# Patient Record
Sex: FEMALE | Race: WHITE | NOT HISPANIC OR LATINO | Employment: FULL TIME
[De-identification: names, ages, dates, MRNs, and addresses within clinical notes are randomized per-mention and may not be internally consistent; named-entity substitution may affect disease eponyms.]

---

## 2017-09-03 ENCOUNTER — HEALTH MAINTENANCE LETTER (OUTPATIENT)
Age: 37
End: 2017-09-03

## 2018-01-29 ENCOUNTER — TRANSFERRED RECORDS (OUTPATIENT)
Dept: HEALTH INFORMATION MANAGEMENT | Facility: CLINIC | Age: 38
End: 2018-01-29

## 2018-02-19 NOTE — TELEPHONE ENCOUNTER
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 Vision not improving after steroid injection   Ref Provider/Clinic: Dr. Woodall/[facility].   Internal Records    External Records 01/29/18 Office notes- [facility]   [facility] Requested?: yes   Done?:      ACTION  02/19/18 2:51 PM   What did you do? Faxed   Who? [facility]   Phone/Fax Number:  Phone:  [phone]     Fax:[phone]   Outcome? Faxed Medical Records Request

## 2018-02-28 ENCOUNTER — PRE VISIT (OUTPATIENT)
Dept: OPHTHALMOLOGY | Facility: CLINIC | Age: 38
End: 2018-02-28

## 2018-04-13 ENCOUNTER — TRANSFERRED RECORDS (OUTPATIENT)
Dept: HEALTH INFORMATION MANAGEMENT | Facility: CLINIC | Age: 38
End: 2018-04-13

## 2018-04-13 LAB
HPV ABSTRACT: NORMAL
PAP-ABSTRACT: NORMAL

## 2018-07-25 NOTE — TELEPHONE ENCOUNTER
FUTURE VISIT INFORMATION      FUTURE VISIT INFORMATION:    Date: 07/26/2018    Time:     Location:   REFERRAL INFORMATION:    Referring provider:  Valery Cronin MD    Referring providers clinic:      Reason for visit/diagnosis          RECORDS REQUESTED FROM:       Clinic name Comments Records Status Imaging Status   [facility] Polanco   MR Thoracic Spine W/WO IV Cont (02/06/2018 2:58 PM)  MR Cervical Spine W/WO IV Cont (02/06/2018 2:58 PM)  FL Lumbar Puncture (For CSF) (02/23/2018 3:12 PM)  Requested 07/25/2018  In-Coming    [facility]  MRI BRAIN/ORBITS W/O&W CON  01/29/2018  Requested 07/25/2018   In-Coming                              RECORDS STATUS      Internal Records; Epic, Care Everywhere and PACS.

## 2018-07-26 ENCOUNTER — PRE VISIT (OUTPATIENT)
Dept: NEUROLOGY | Facility: CLINIC | Age: 38
End: 2018-07-26

## 2018-07-26 ENCOUNTER — OFFICE VISIT (OUTPATIENT)
Dept: NEUROLOGY | Facility: CLINIC | Age: 38
End: 2018-07-26
Payer: COMMERCIAL

## 2018-07-26 VITALS
SYSTOLIC BLOOD PRESSURE: 128 MMHG | HEIGHT: 68 IN | HEART RATE: 76 BPM | OXYGEN SATURATION: 99 % | BODY MASS INDEX: 28.52 KG/M2 | RESPIRATION RATE: 20 BRPM | WEIGHT: 188.2 LBS | DIASTOLIC BLOOD PRESSURE: 89 MMHG

## 2018-07-26 DIAGNOSIS — G35 MULTIPLE SCLEROSIS (H): Primary | ICD-10-CM

## 2018-07-26 PROCEDURE — G0463 HOSPITAL OUTPT CLINIC VISIT: HCPCS | Mod: ZF

## 2018-07-26 RX ORDER — VENLAFAXINE 75 MG/1
75 TABLET ORAL
COMMUNITY
End: 2018-08-30

## 2018-07-26 RX ORDER — LEVOTHYROXINE SODIUM 137 UG/1
137 TABLET ORAL
COMMUNITY
Start: 2018-04-13 | End: 2018-08-30

## 2018-07-26 RX ORDER — GLATIRAMER 40 MG/ML
INJECTION, SOLUTION SUBCUTANEOUS
COMMUNITY
Start: 2018-03-29 | End: 2020-01-09

## 2018-07-26 RX ORDER — VARENICLINE TARTRATE 1 MG/1
1 TABLET, FILM COATED ORAL
COMMUNITY
Start: 2018-04-13 | End: 2018-11-06

## 2018-07-26 ASSESSMENT — PAIN SCALES - GENERAL: PAINLEVEL: NO PAIN (0)

## 2018-07-26 NOTE — PROGRESS NOTES
THE [facility] MULTIPLE SCLEROSIS CLINIC  NEW PATIENT EVALUATION/CONSULTATION    Referral source:   Mehrdad  [facility] SPECIALTY [address] / [place] [address]            PRINCIPAL NEUROLOGIC DIAGNOSIS: Multiple Sclerosis    DISEASE SUMMARY  Date of onset: 3-18  Date of diagnosis of MS: 3-2018  Disease course at onset: Relapsing Remitting  Current disease course: Relapsing Remitting  Previous disease therapies: Glatopa  Current disease therapy:   Most recent MRI brain: 1-18  Most recent MRI cervical spine: 2-18  CSF: OCB  JCV serology result and date: neg      HISTORY OF ILLNESS:    An opinion on this 38 year old right handed genetic female  was requested by Dr. Valery Cronin for management. The patient was accompanied by no one. Her mother was diagnosed with MS at 17 and she passed away at age 61 bed bound her maternal grandfather had it as well. She was in her USOH until January when she developed left eye blurry vision, she went to the ophthalmologist the next day and was diagnosed with On and was sent to obtain an MRI, she was started on IV solumedrol x 5 days, she thinks she got worse before improving, it took her about 3 months but she returned to the previous baseline. The MRI was abnormal and she was referred to Dr. Armando, at that time she was working with the stroke team at [facility]. She eventually saw Mehrdad Freeman in march and was started on Generic GA, she was getting reactions, she was switched to Copaxone and also had some reactions but not as dramatic. She still on Copaxone with normal reactions. She feels well and just stopped smoking, on chantix, she does not sleep well.    Current Symptoms:  1. Fatigue  2. Insomnia  3. Anxiety        PAST HISTORY:  Past Medical History:   Diagnosis Date     Anxiety disorder      Hypothyroid      Ovarian cyst        Past Surgical History:   Procedure Laterality Date     HC TOOTH EXTRACTION W/FORCEP       PE TUBES        TONSILLECTOMY & ADENOIDECTOMY                Current Outpatient Prescriptions:  Current Outpatient Prescriptions   Medication     Glatiramer Acetate 40 MG/ML SOSY     levothyroxine (SYNTHROID, LEVOTHROID) 150 MCG tablet     levothyroxine (SYNTHROID/LEVOTHROID) 137 MCG tablet     Probiotic Product (PROBIOTIC DAILY PO)     varenicline (CHANTIX) 1 MG tablet     venlafaxine (EFFEXOR) 75 MG tablet     No current facility-administered medications for this visit.           ALLERGIES       Allergies   Allergen Reactions     Nkda [No Known Drug Allergies]          Social History    Social History     Social History     Marital status:      Spouse name: N/A     Number of children: N/A     Years of education: N/A     Occupational History     Not on file.     Social History Main Topics     Smoking status: Former Smoker     Smokeless tobacco: Never Used     Alcohol use No     Drug use: No     Sexual activity: Yes     Partners: Male     Birth control/ protection: Pill     Other Topics Concern     Not on file     Social History Narrative    Caffeine intake/servings daily - 0    Calcium intake/servings daily - 2    Exercise 0 times weekly - describe 0    Sunscreen used - Yes    Seatbelts used - Yes    Guns stored in the home - Yes    Self Breast Exam - No    Pap test up to date -  Yes    Eye exam up to date -  Yes    Dental exam up to date -  No    DEXA scan up to date -  Not Applicable    Flex Sig/Colonoscopy up to date -  Not Applicable    Mammography up to date -  Not Applicable    Immunizations reviewed and up to date - Yes    Abuse: Current or Past (Physical, Sexual or Emotional) - No    Do you feel safe in your environment - Yes    Do you cope well with stress - Yes    Do you suffer from insomnia - No    Last updated by: Joanne Gilligan  1/15/2009                     FAMILY HISTORY     Family History   Problem Relation Age of Onset     Hypertension Father      Cerebrovascular Disease Father      Neurologic Disorder  Mother      ms     Cancer Maternal Grandmother      brain     HEART DISEASE Paternal Grandmother      chf     Diabetes Paternal Grandfather      Diabetes Other          REVIEW OF SYSTEMS:    Comprehensive review of systems otherwise was negative, including constitutional, head and neck, cardiovascular, pulmonary, gastrointestinal, endocrine, urologic, reproductive, rheumatic, hematologic, immunologic, dermatologic, and psychiatric.    Nutritional concerns: None  Driving issues: None   Safety concerns regarding living situations and safety at home: None  Risk of falls: None  Pain: None    PHYSICAL EXAM:    Hair, skin, nails, and joints were normal. Neck was supple without Lhermitte's phenomenon.  There was no percussion tenderness over the spine.     The patient was alert and oriented to person, place, and time with normal language, attention and concentration, recent and remote memory, praxis, and intellectual function. Affect was normal. The patient did not appear depressed.    Visual acuity:  OD 20/20  OS 20/20    Correction: with contacts    Visual fields were full to confrontation.   Pupils were 3mm and briskly reactive OU without a relative afferent pupillary defect.  Funduscopic examination was normal right eye palor OS  Extraocular movements: Intact without ELIU  Facial sensation is normal. Normal strength of the muscles of mastication:   Muscles of facial expression were normal  Hearing was normal. Gag reflex and palatal movements were normal. Sternocleidomastoid and trapezius power were normal. Tongue movements were normal. There was no dysarthria.    Motor Examination:   There was no pronator drift.       Motor    Upper      Right Left   Shoulder Abduction 5 5   Elbow Flexion 5 5   Elbow Extension 5 5   Wrist Extension 5 5   Digit Extension 5 5   Digit Flexion 5 5   APB 5 5   Tone 0 0   Lower       Right Left   Hip Flexion 5 5   Knee Extension 5 5   Knee Flexion 5 5   Foot Dorsiflexion 5 5   Foot Plantar  Flexion 5 5   EH 5 5   Toe Flexion 5 5   Tone 0 0               Reflexes:     Reflexes       Right  Left   Biceps 1  1   Triceps 1  1   Brachioradialis 1  1   Patellar  3  2   Achilles unsustained clonus 1 beat  1   Babinski down  down         Coordination:     Right Left   RRM Normal Normal   MARTA Normal Normal   FTN Normal Normal   RRM Normal Normal   HKS Normal Normal         Sensory examination:    Light touch:  Intact in all extremities      Coordination and Gait        Gait Normal   Right Left   Romberg Normal  Heel Normal Normal   Tandem {Normal  Toe Normal Normal                   QUANTITATIVE SCORES:    Visual: 0-Normal  Brainstem: 0-Normal  Pyramidal: 1-Signs only  Cerebellar: 0-Normal  Sensory: 0-Normal  Bladder/Bowel: 0-Normal  Cerebral: 0-Normal  Ambulatory: 0-Unrestricted    EDSS: 1.0- no disability, minimal signs in one FS (one FS grade 1)            REVIEW OF IMAGING STUDIES:    I personally reviewed the following images:    Unavailable    ASSESSMENT:    RRMS clinically stable on Copaxone, I have no doubt regarding the diagnosis but would like to review the images myself. Fatigue an issue, anxiety and insomnia.    PLAN:  The patient will continue copaxone and was explained that the site reactions will improve after 6 months on it, she was warned about generalized reactions and explained they are benign. She will stat Omega 3 and CoQ10 and has been encouraged to exercise, eat healthy and work on coping mechanisms for anxiety. She was reassured pregnancy should be safe and beneficial to MS, the post partum period has an increased risk for a relapse but we can monitor her closely, she will think about it.    She will start Melatonin Ultra 3-6 mg po 30 min before bedtime.    Today we will obtain blood to look for vitamin D levels after recent treatment for deficiency and also look at her TSH to make sure her thyroiditis is not acting up and causing anxiety.    She will also undergo and MRI B, C and T  spine in August which will be compared with February to confirm disease stability from the radiological stand point.    She has signed a Copaxone form to switch providers.    Finally I will follow the patient up in 3 month(s) as long as the patient is doing well. I instructed the patient to call or mychart my office with any concerns or questions.    I spent 60 minutes in this visit, with >50% direct patient time spent counseling about prognosis, treatment options, and coordination of care.     My recommendations will be communicated back to the patient's physician(s) by mail.  Follow-up is expected to be with me.      Liz Franco MD  Chief, Multiple Sclerosis Division  Department of Neurology  [facility]      (Chart documentation was completed in part with Dragon voice-recognition software. Even though reviewed, some grammatical, spelling, and word errors may remain.)

## 2018-07-26 NOTE — MR AVS SNAPSHOT
After Visit Summary   7/26/2018    Nickie Garcia    MRN: [id number]           Patient Information     Date Of Birth          1980        Visit Information        Provider Department      7/26/2018 4:00 PM Liz Franco MD [facility] Multiple Sclerosis        Today's Diagnoses     Multiple sclerosis (H)    -  1       Follow-ups after your visit        Follow-up notes from your care team     Return in about 3 months (around 10/26/2018).      Future tests that were ordered for you today     Open Future Orders        Priority Expected Expires Ordered    MRI Thoracic spine w & w/o contrast Routine  7/26/2019 7/26/2018    MR Brain w/o & w Contrast Routine  7/26/2019 7/26/2018    TSH Routine  7/26/2019 7/26/2018    Vitamin D Deficiency Screening Routine  7/26/2019 7/26/2018    MRI Cervical spine w & w/o contrast Routine  7/26/2019 7/26/2018            Who to contact     If you have questions or need follow up information about today's clinic visit or your schedule please contact [facility] MULTIPLE SCLEROSIS directly at [phone].  Normal or non-critical lab and imaging results will be communicated to you by SMS Assisthart, letter or phone within 4 business days after the clinic has received the results. If you do not hear from us within 7 days, please contact the clinic through Poptentt or phone. If you have a critical or abnormal lab result, we will notify you by phone as soon as possible.  Submit refill requests through [employer] or call your pharmacy and they will forward the refill request to us. Please allow 3 business days for your refill to be completed.          Additional Information About Your Visit        MyChart Information     [employer] gives you secure access to your electronic health record. If you see a primary care provider, you can also send messages to your care team and make appointments. If you have questions, please call your primary care clinic.  If you do not have a primary care  "provider, please call [phone] and they will assist you.        Care EveryWhere ID     This is your Care EveryWhere ID. This could be used by other organizations to access your [facility] medical records  [id number]        Your Vitals Were     Pulse Respirations Height Pulse Oximetry BMI (Body Mass Index)       76 20 1.727 m (5' 8\") 99% 28.62 kg/m2        Blood Pressure from Last 3 Encounters:   07/26/18 128/89   10/03/12 100/84   09/14/12 100/64    Weight from Last 3 Encounters:   07/26/18 85.4 kg (188 lb 3.2 oz)   10/03/12 79.9 kg (176 lb 3.2 oz)   09/14/12 80.7 kg (178 lb)               Primary Care Provider Office Phone # Fax #    Tracie Carrillo PA-C [phone]       [place] [address]        Equal Access to Services     FEI ALFONSO : Hadii aad ku hadasho Soomaali, waaxda luqadaha, qaybta kaalmada adeegyada, waxay idiin haymyran dru cooley . So [facility] [phone].    ATENCIÓN: Si habla español, tiene a luong disposición servicios gratuitos de asistencia lingüística. Llame al [phone].    We comply with applicable federal civil rights laws and [place] laws. We do not discriminate on the basis of race, color, national origin, age, disability, sex, sexual orientation, or gender identity.            Thank you!     Thank you for choosing [facility] MULTIPLE SCLEROSIS  for your care. Our goal is always to provide you with excellent care. Hearing back from our patients is one way we can continue to improve our services. Please take a few minutes to complete the written survey that you may receive in the mail after your visit with us. Thank you!             Your Updated Medication List - Protect others around you: Learn how to safely use, store and throw away your medicines at www.disposemymeds.org.          This list is accurate as of 7/26/18  5:09 PM.  Always use your most recent med list.                   Brand Name Dispense Instructions for use Diagnosis "    Glatiramer Acetate 40 MG/ML Sosy           * levothyroxine 150 MCG tablet    SYNTHROID/LEVOTHROID    90 tablet    Take 1 tablet by mouth daily.        * levothyroxine 137 MCG tablet    SYNTHROID/LEVOTHROID     Take 137 mcg by mouth        PROBIOTIC DAILY PO           varenicline 1 MG tablet    CHANTIX     Take 1 mg by mouth        venlafaxine 75 MG tablet    EFFEXOR     75 mg        * Notice:  This list has 2 medication(s) that are the same as other medications prescribed for you. Read the directions carefully, and ask your doctor or other care provider to review them with you.

## 2018-07-26 NOTE — NURSING NOTE
"Chief Complaint   Patient presents with     Consult     [facility] NEW - CONSULT       Initial /89 (BP Location: Right arm, Patient Position: Sitting, Cuff Size: Adult Regular)  Pulse 76  Resp 20  Ht 1.727 m (5' 8\")  Wt 85.4 kg (188 lb 3.2 oz)  SpO2 99%  BMI 28.62 kg/m2 Estimated body mass index is 28.62 kg/(m^2) as calculated from the following:    Height as of this encounter: 1.727 m (5' 8\").    Weight as of this encounter: 85.4 kg (188 lb 3.2 oz)..  BP completed using cuff size: regular  Medications Reconciled: Yes  Ria Rush CMA  4:00 PM          "

## 2018-07-30 ENCOUNTER — TELEPHONE (OUTPATIENT)
Dept: NEUROLOGY | Facility: CLINIC | Age: 38
End: 2018-07-30

## 2018-07-30 DIAGNOSIS — G35 MULTIPLE SCLEROSIS (H): ICD-10-CM

## 2018-07-30 LAB — TSH SERPL DL<=0.005 MIU/L-ACNC: 3.55 MU/L (ref 0.4–4)

## 2018-07-30 PROCEDURE — 36415 COLL VENOUS BLD VENIPUNCTURE: CPT | Performed by: PSYCHIATRY & NEUROLOGY

## 2018-07-30 PROCEDURE — 82306 VITAMIN D 25 HYDROXY: CPT | Performed by: PSYCHIATRY & NEUROLOGY

## 2018-07-30 PROCEDURE — 84443 ASSAY THYROID STIM HORMONE: CPT | Performed by: PSYCHIATRY & NEUROLOGY

## 2018-07-30 NOTE — TELEPHONE ENCOUNTER
Prior Authorization Specialty Medication Request    Medication/Dose: Copaxone 40 mg  ICD code (if different than what is on RX):  Relapsing Remitting Multiple Sclerosis G35  Previously Tried and Failed:  Glatopa    Important Lab Values: na  Rationale: Continuation of disease modifying therapy for demyelinating disease. Note change in ordering providers. Please approve.     Insurance Name: na  Insurance ID: na  Insurance Phone Number: na    Pharmacy Information (if different than what is on RX)  Name:  na  Phone:  na

## 2018-07-30 NOTE — TELEPHONE ENCOUNTER
PA Initiation    Medication: Copaxone 40 mg  Insurance Company: [employer] - Phone [phone] Fax [phone]  Pharmacy Filling the Rx: [facility] - [place], [place] - [facility] [address]  Filling Pharmacy Phone: [phone]  Filling Pharmacy Fax:    Start Date: 7/30/2018    ** Pt failed Glatopa, wants to switch to brand Copaxone (injection site rxns)

## 2018-07-30 NOTE — TELEPHONE ENCOUNTER
Patient was seen in clinic on 7/26 by Dr. Franco to establish care. Patient has been stable on Copaxone however a new start form was completed and signed by both patient and MD; this will transfer her prescription to Dr. Franco. PA submitted in separate encounter and start form given to pharmacy liaison.

## 2018-07-30 NOTE — TELEPHONE ENCOUNTER
Start form faxed to [employer] via fax# [phone]. Start form copied and sent to scanning.

## 2018-07-31 LAB — DEPRECATED CALCIDIOL+CALCIFEROL SERPL-MC: 42 UG/L (ref 20–75)

## 2018-07-31 NOTE — TELEPHONE ENCOUNTER
Phoned [employer] at tel [phone] to check PA status. PA was received on 7/30/18 and will take up to 5 days to review.

## 2018-08-01 NOTE — TELEPHONE ENCOUNTER
Prior Authorization Approval    Authorization Effective Date: 7/30/2018  Authorization Expiration Date: 7/5/2023  Medication: Copaxone 40mg   Approved Dose/Quantity:   Reference #: CMM key# [id number]   Insurance Company: [employer] - Phone [phone] Fax [phone]  Expected CoPay:       CoPay Card Available: Yes   Foundation Assistance Needed:    Which Pharmacy is filling the prescription (Not needed for infusion/clinic administered): [facility] - [place], [place] - [facility] [address]  Pharmacy Notified: Yes  Patient Notified:

## 2018-08-01 NOTE — TELEPHONE ENCOUNTER
Calling [email or web address] at [phone] opt 1, opt 2, opt 3 to check status of start form enrollment. Start form rec'd. Pt listed as starting Copaxone in April 2018 but never did (generic restriction?).  states she left pt a VM and rx was triaged to [facility] with copay assistance (pt has high OOP per hub). Pt will again be advised to call pharmacy and hub.

## 2018-08-01 NOTE — TELEPHONE ENCOUNTER
Approval faxed to hub, indicating pt will be filling with [facility]. Rx form faxed to [facility]. [facility] rec'd start form and should be setting pt up with copay assistance.

## 2018-08-30 ENCOUNTER — OFFICE VISIT (OUTPATIENT)
Dept: FAMILY MEDICINE | Facility: CLINIC | Age: 38
End: 2018-08-30
Payer: COMMERCIAL

## 2018-08-30 VITALS
RESPIRATION RATE: 20 BRPM | HEART RATE: 96 BPM | SYSTOLIC BLOOD PRESSURE: 120 MMHG | WEIGHT: 188 LBS | BODY MASS INDEX: 28.59 KG/M2 | DIASTOLIC BLOOD PRESSURE: 78 MMHG | OXYGEN SATURATION: 99 % | TEMPERATURE: 99.8 F

## 2018-08-30 DIAGNOSIS — Z87.42 HISTORY OF ABNORMAL CERVICAL PAP SMEAR: ICD-10-CM

## 2018-08-30 DIAGNOSIS — F41.1 GENERALIZED ANXIETY DISORDER: ICD-10-CM

## 2018-08-30 DIAGNOSIS — E06.3 HYPOTHYROIDISM DUE TO HASHIMOTO'S THYROIDITIS: Primary | ICD-10-CM

## 2018-08-30 DIAGNOSIS — G47.9 RESTLESS SLEEPER: ICD-10-CM

## 2018-08-30 DIAGNOSIS — F17.200 SMOKER: ICD-10-CM

## 2018-08-30 DIAGNOSIS — G35 MULTIPLE SCLEROSIS (H): ICD-10-CM

## 2018-08-30 PROBLEM — Z86.19 HISTORY OF HPV INFECTION: Status: RESOLVED | Noted: 2018-08-30 | Resolved: 2018-08-30

## 2018-08-30 PROBLEM — Z86.19 HISTORY OF HPV INFECTION: Status: ACTIVE | Noted: 2018-08-30

## 2018-08-30 PROCEDURE — 99204 OFFICE O/P NEW MOD 45 MIN: CPT | Performed by: NURSE PRACTITIONER

## 2018-08-30 RX ORDER — LEVOTHYROXINE SODIUM 137 UG/1
137 TABLET ORAL DAILY
Qty: 30 TABLET | Refills: 0 | Status: SHIPPED | OUTPATIENT
Start: 2018-08-30 | End: 2018-10-01

## 2018-08-30 RX ORDER — VARENICLINE TARTRATE 1 MG/1
1 TABLET, FILM COATED ORAL 2 TIMES DAILY
Qty: 56 TABLET | Refills: 2 | Status: SHIPPED | OUTPATIENT
Start: 2018-08-30 | End: 2019-07-19

## 2018-08-30 RX ORDER — VENLAFAXINE 75 MG/1
75 TABLET ORAL DAILY
Qty: 30 TABLET | Refills: 11 | Status: SHIPPED | OUTPATIENT
Start: 2018-08-30 | End: 2019-09-05

## 2018-08-30 RX ORDER — VARENICLINE TARTRATE 1 MG/1
1 TABLET, FILM COATED ORAL
Qty: 60 TABLET | Status: CANCELLED | OUTPATIENT
Start: 2018-08-30

## 2018-08-30 ASSESSMENT — PAIN SCALES - GENERAL: PAINLEVEL: NO PAIN (0)

## 2018-08-30 NOTE — PATIENT INSTRUCTIONS
Restart Chantix  Will send refills  Follow-up at lab in 2 weeks for TSH.    At [facility], we strive to deliver an exceptional experience to you, every time we see you.  If you receive a survey in the mail, please send us back your thoughts. We really do value your feedback.    Based on your medical history, these are the current health maintenance/preventive care services that you are due for (some may have been done at this visit.)  Health Maintenance Due   Topic Date Due     PAP Q3 YR  05/30/2015     LIPID MONITORING Q5 YEARS  05/30/2017       Suggested websites for health information:  Www.PeekYou.Chronicity : Up to date and easily searchable information on multiple topics.  Www.web care [employer].gov : medication info, interactive tutorials, watch real surgeries online  Www.familydoctor.org : good info from the Academy of Family Physicians  Www.cdc.gov : public health info, travel advisories, epidemics (H1N1)  Www.aap.org : children's health info, normal development, vaccinations  Www.health.[facility].[place].us : [place] dept of health, public health issues in [place], N1N1    Your care team:                            Family Medicine Internal Medicine   MD Artie Holm MD Shantel Branch-Fleming, MD Katya Georgiev PA-C Megan Hill, APRRAMIRO Tinsley MD Pediatrics   ANABELLA Rodriguez, MD Angelia Rust APRN CNP   MD Maxine Holland MD Deborah Mielke, MD Kim Thein, APRN [facility]      Clinic hours: Monday - Thursday 7 am-7 pm; Fridays 7 am-5 pm.   Urgent care: Monday - Friday 11 am-9 pm; Saturday and Sunday 9 am-5 pm.  Pharmacy : Monday -Thursday 8 am-8 pm; Friday 8 am-6 pm; Saturday and Sunday 9 am-5 pm.     Clinic: [phone]   Pharmacy: [phone]

## 2018-08-30 NOTE — MR AVS SNAPSHOT
After Visit Summary   8/30/2018    Nickie Garcia    MRN: [id number]           Patient Information     Date Of Birth          1980        Visit Information        Provider Department      8/30/2018 4:40 PM Winter Perdomo APRN CNP [facility]        Today's Diagnoses     Hypothyroidism due to Hashimoto's thyroiditis    -  1    Generalized anxiety disorder          Care Instructions    Restart Chantix  Will send refills  Follow-up at lab in 2 weeks for TSH.    At [facility], we strive to deliver an exceptional experience to you, every time we see you.  If you receive a survey in the mail, please send us back your thoughts. We really do value your feedback.    Based on your medical history, these are the current health maintenance/preventive care services that you are due for (some may have been done at this visit.)  Health Maintenance Due   Topic Date Due     PAP Q3 YR  05/30/2015     LIPID MONITORING Q5 YEARS  05/30/2017       Suggested websites for health information:  Www.[facility] : Up to date and easily searchable information on multiple topics.  Www.medlineplus.gov : medication info, interactive tutorials, watch real surgeries online  Www.familydoctor.org : good info from the Academy of Family Physicians  Www.cdc.gov : public health info, travel advisories, epidemics (H1N1)  Www.aap.org : children's health info, normal development, vaccinations  Www.health.state.[place].us : [place] dept of health, public health issues in [place], N1N1    Your care team:                            Family Medicine Internal Medicine   MD Artie Holm MD Shantel Branch-Fleming, MD Katya Georgiev PA-C Megan Hill, APRN CNP Nam Ho, MD Pediatrics   ANABELLA Rodriguez CNP Paula Brito, MD Amelia Massimini APRN CNP Shaista Malik, MD Bethany Templen, MD Deborah Mielke, MD Kim Thein, APRN CNP      Clinic hours: Monday -  Thursday 7 am-7 pm; Fridays 7 am-5 pm.   Urgent care: Monday - Friday 11 am-9 pm; Saturday and Sunday 9 am-5 pm.  Pharmacy : Monday -Thursday 8 am-8 pm; Friday 8 am-6 pm; Saturday and Sunday 9 am-5 pm.     Clinic: [phone]   Pharmacy: [phone]              Follow-ups after your visit        Who to contact     If you have questions or need follow up information about today's clinic visit or your schedule please contact [facility] directly at [phone].  Normal or non-critical lab and imaging results will be communicated to you by MyChart, letter or phone within 4 business days after the clinic has received the results. If you do not hear from us within 7 days, please contact the clinic through [facility] studiot or phone. If you have a critical or abnormal lab result, we will notify you by phone as soon as possible.  Submit refill requests through [employer] or call your pharmacy and they will forward the refill request to us. Please allow 3 business days for your refill to be completed.          Additional Information About Your Visit        MyChart Information     [employer] gives you secure access to your electronic health record. If you see a primary care provider, you can also send messages to your care team and make appointments. If you have questions, please call your primary care clinic.  If you do not have a primary care provider, please call [phone] and they will assist you.        Care EveryWhere ID     This is your Care EveryWhere ID. This could be used by other organizations to access your [place] medical records  [id number]        Your Vitals Were     Pulse Temperature Respirations Last Period Pulse Oximetry BMI (Body Mass Index)    96 99.8  F (37.7  C) (Oral) 20 08/06/2018 (Exact Date) 99% 28.59 kg/m2       Blood Pressure from Last 3 Encounters:   08/30/18 120/78   07/26/18 128/89   10/03/12 100/84    Weight from Last 3 Encounters:   08/30/18 188 lb (85.3 kg)   07/26/18 188 lb  3.2 oz (85.4 kg)   10/03/12 176 lb 3.2 oz (79.9 kg)              Today, you had the following     No orders found for display       Primary Care Provider Office Phone # Fax #    Tracie Carrillo PA-C [phone]       [address] [address]        Equal Access to Services     TRACY ALFONSO : Hadii aad ku hadasho Soomaali, waaxda luqadaha, qaybta kaalmada adeegyada, waxay idiin hayaan adeeg kharash la'myran . So [facility] [phone].    ATENCIÓN: Si habla español, tiene a luong disposición servicios gratuitos de asistencia lingüística. Bhargavame al [phone].    We comply with applicable federal civil rights laws and [place] laws. We do not discriminate on the basis of race, color, national origin, age, disability, sex, sexual orientation, or gender identity.            Thank you!     Thank you for choosing [facility]  for your care. Our goal is always to provide you with excellent care. Hearing back from our patients is one way we can continue to improve our services. Please take a few minutes to complete the written survey that you may receive in the mail after your visit with us. Thank you!             Your Updated Medication List - Protect others around you: Learn how to safely use, store and throw away your medicines at www.disposemymeds.org.          This list is accurate as of 8/30/18  5:22 PM.  Always use your most recent med list.                   Brand Name Dispense Instructions for use Diagnosis    Glatiramer Acetate 40 MG/ML Sosy           levothyroxine 137 MCG tablet    SYNTHROID/LEVOTHROID     Take 137 mcg by mouth        PROBIOTIC DAILY PO           varenicline 1 MG tablet    CHANTIX     Take 1 mg by mouth        venlafaxine 75 MG tablet    EFFEXOR     75 mg

## 2018-08-30 NOTE — PROGRESS NOTES
SUBJECTIVE:   Nickie Garcia is a 38 year old female who presents to clinic today for the following health issues:    New Patient/Transfer of Care  Establish Care    Hypothyroidism:  Patient states she feels better when her TSH is around 2.  Last TSH was 3.55 4 weeks ago.  No dose change was made.  Main complaint is of fatigue.  States this is difficulty to differentiate from MS symptoms.  Patient denies hair skin or nail changes.  No hair loss, dry skin or brittle nails. No diarrhea, or constipation.  Periods are regular (every 28 days).  No temperature intolerances.  She does admit to long history of poor sleep.  She does not have a problem falling asleep but has an issue with staying asleep.   says she snores, he does not mention any apneic episodes.  Patient states she is very restless in her sleep.  No history of sleep study.      MS, relapsing, remitting:  Follows with neurology with Dr. Franco and the [facility].  Patient was diagnosed in January of 2018.  She states since starting Copaxone she is back to her norm.      Smoking:  Chantix has worked for her in the past.  Recently restarted smoking so would like to restart Chantix.  Her  has quit smoking so she thinks it will be easier for her now.  Has tried gum, patches, and Zyban (reaction: optic neuritis).Chantix gives her strange dreams but this is a tolerable side effect for her.      Problem list and histories reviewed & adjusted, as indicated.  Additional history: as documented    Patient Active Problem List   Diagnosis     CARDIOVASCULAR SCREENING; LDL GOAL LESS THAN 160     Hashimotos thyroiditis     Vitamin D deficiency     Fatigue     Anxiety disorder     Multiple sclerosis (H)     History of abnormal cervical Pap smear     Past Surgical History:   Procedure Laterality Date     HC TOOTH EXTRACTION W/FORCEP       PE TUBES       TONSILLECTOMY & ADENOIDECTOMY         Social History   Substance Use Topics     Smoking status:  Former Smoker     Smokeless tobacco: Never Used     Alcohol use No     Family History   Problem Relation Age of Onset     Hypertension Father      Cerebrovascular Disease Father 54     hemorrhagic stroke     Neurologic Disorder Mother      ms     No Known Problems Sister      Cancer Maternal Grandmother      brain     Multiple Sclerosis Maternal Grandfather      HEART DISEASE Paternal Grandmother      chf     Diabetes Paternal Grandfather      Diabetes Other      Cancer Paternal Uncle      cancer in eye and kidney         Current Outpatient Prescriptions   Medication Sig Dispense Refill     Glatiramer Acetate 40 MG/ML SOSY        levothyroxine (SYNTHROID/LEVOTHROID) 137 MCG tablet Take 137 mcg by mouth       Probiotic Product (PROBIOTIC DAILY PO)        varenicline (CHANTIX) 1 MG tablet Take 1 mg by mouth       venlafaxine (EFFEXOR) 75 MG tablet 75 mg       BP Readings from Last 3 Encounters:   08/30/18 120/78   07/26/18 128/89   10/03/12 100/84    Wt Readings from Last 3 Encounters:   08/30/18 188 lb (85.3 kg)   07/26/18 188 lb 3.2 oz (85.4 kg)   10/03/12 176 lb 3.2 oz (79.9 kg)                    Reviewed and updated as needed this visit by clinical staff  Tobacco  Allergies  Meds  Med Hx  Surg Hx  Fam Hx  Soc Hx      Reviewed and updated as needed this visit by Provider  Tobacco  Allergies  Meds  Med Hx  Surg Hx  Fam Hx  Soc Hx        ROS:  Constitutional, HEENT, cardiovascular, pulmonary, GI, , musculoskeletal, neuro, skin, endocrine and psych systems are negative, except as otherwise noted.    OBJECTIVE:     /78 (BP Location: Left arm, Patient Position: Chair, Cuff Size: Adult Regular)  Pulse 96  Temp 99.8  F (37.7  C) (Oral)  Resp 20  Wt 188 lb (85.3 kg)  LMP 08/06/2018 (Exact Date)  SpO2 99%  BMI 28.59 kg/m2  Body mass index is 28.59 kg/(m^2).  GENERAL: healthy, alert and no distress  EYES: Eyes grossly normal to inspection, PERRL and conjunctivae and sclerae normal  HENT: ear  canals and TM's normal, nose and mouth without ulcers or lesions  NECK: no adenopathy, no asymmetry, masses, or scars and thyroid normal to palpation  RESP: lungs clear to auscultation - no rales, rhonchi or wheezes  CV: regular rate and rhythm, normal S1 S2, no S3 or S4, no murmur, click or rub, no peripheral edema and peripheral pulses strong  ABDOMEN: soft, nontender, no hepatosplenomegaly, no masses and bowel sounds normal  MS: no gross musculoskeletal defects noted, no edema    Diagnostic Test Results:  none     ASSESSMENT/PLAN:     1. Hypothyroidism due to Hashimoto's thyroiditis  Refilled x one month as patient will return in 2 weeks for repeat to ensure no dose adjustment is needed.  - levothyroxine (SYNTHROID/LEVOTHROID) 137 MCG tablet; Take 1 tablet (137 mcg) by mouth daily On an empty stomach 30 minutes prior to eating  Dispense: 30 tablet; Refill: 0  -TSH future anytime    2. Smoker  Restarting Chantix.  - varenicline (CHANTIX STARTING MONTH BEHZAD) 0.5 MG X 11 & 1 MG X 42 tablet; Take 0.5 mg tab daily for 3 days, then 0.5 mg tab twice daily for 4 days, then 1 mg twice daily.  Dispense: 53 tablet; Refill: 0  - varenicline (CHANTIX) 1 MG tablet; Take 1 tablet (1 mg) by mouth 2 times daily  Dispense: 56 tablet; Refill: 2    3. Multiple sclerosis (H)  See HPI.  Symptoms well-controlled for now.  Follows at [facility].   - SLEEP EVALUATION & MANAGEMENT REFERRAL - [facility]  [phone] (Age 15 and up); Future    4. Generalized anxiety disorder  Refilled, doing well.  - venlafaxine (EFFEXOR) 75 MG tablet; Take 1 tablet (75 mg) by mouth daily  Dispense: 30 tablet; Refill: 11    5. Restless sleeper  - SLEEP EVALUATION & MANAGEMENT REFERRAL - [facility]  [phone] (Age 15 and up); Future    6. History of abnormal cervical Pap smear  In 2017 LSIL + HPV.  Colpo was done (no records available).  4/2018, pap was NIL, HPV neg, repeat 3 years  (4/2021).      JUNO Ku [facility]

## 2018-08-30 NOTE — Clinical Note
Please abstract the following data from this visit with this patient into the appropriate field in Epic:  Pap smear done on this date: 4/13/18:  Pap NIL/HPV neg 2/2017: Pap LSIL HPV + (not 16/18) By [facility]

## 2018-10-01 DIAGNOSIS — E06.3 HYPOTHYROIDISM DUE TO HASHIMOTO'S THYROIDITIS: ICD-10-CM

## 2018-10-01 NOTE — TELEPHONE ENCOUNTER
"Requested Prescriptions   Pending Prescriptions Disp Refills     levothyroxine (SYNTHROID/LEVOTHROID) 137 MCG tablet 30 tablet 0    Last Written Prescription Date:  8/30/18  Last Fill Quantity: 30,  # refills: 0   Last Office Visit with [facility], P or [facility] prescribing provider:  8/30/2018     Future Office Visit:      Sig: Take 1 tablet (137 mcg) by mouth daily On an empty stomach 30 minutes prior to eating    Thyroid Protocol Passed    10/1/2018  2:51 PM       Passed - Patient is 12 years or older       Passed - Recent (12 mo) or future (30 days) visit within the authorizing provider's specialty    Patient had office visit in the last 12 months or has a visit in the next 30 days with authorizing provider or within the authorizing provider's specialty.  See \"Patient Info\" tab in inbasket, or \"Choose Columns\" in Meds & Orders section of the refill encounter.           Passed - Normal TSH on file in past 12 months    Recent Labs   Lab Test  07/30/18   1510   TSH  3.55             Passed - No active pregnancy on record    If patient is pregnant or has had a positive pregnancy test, please check TSH.         Passed - No positive pregnancy test in past 12 months    If patient is pregnant or has had a positive pregnancy test, please check TSH.            "

## 2018-10-03 NOTE — TELEPHONE ENCOUNTER
Routing refill request to provider for review/approval because:  Payton given x1 and patient did not follow up, please advise.    Tracie Tai RN, [facility]

## 2018-10-04 RX ORDER — LEVOTHYROXINE SODIUM 137 UG/1
137 TABLET ORAL DAILY
Qty: 30 TABLET | Refills: 0 | Status: SHIPPED | OUTPATIENT
Start: 2018-10-04 | End: 2018-11-20

## 2018-10-11 ENCOUNTER — OFFICE VISIT (OUTPATIENT)
Dept: FAMILY MEDICINE | Facility: CLINIC | Age: 38
End: 2018-10-11
Payer: COMMERCIAL

## 2018-10-11 VITALS
HEIGHT: 68 IN | BODY MASS INDEX: 29.7 KG/M2 | HEART RATE: 114 BPM | WEIGHT: 196 LBS | DIASTOLIC BLOOD PRESSURE: 79 MMHG | OXYGEN SATURATION: 100 % | SYSTOLIC BLOOD PRESSURE: 138 MMHG | TEMPERATURE: 97.3 F

## 2018-10-11 DIAGNOSIS — J06.9 VIRAL UPPER RESPIRATORY INFECTION: Primary | ICD-10-CM

## 2018-10-11 DIAGNOSIS — G35 MULTIPLE SCLEROSIS (H): ICD-10-CM

## 2018-10-11 DIAGNOSIS — Z13.6 CARDIOVASCULAR SCREENING; LDL GOAL LESS THAN 160: ICD-10-CM

## 2018-10-11 PROCEDURE — 99213 OFFICE O/P EST LOW 20 MIN: CPT | Performed by: NURSE PRACTITIONER

## 2018-10-11 NOTE — PATIENT INSTRUCTIONS
At [facility], we strive to deliver an exceptional experience to you, every time we see you.  If you receive a survey in the mail, please send us back your thoughts. We really do value your feedback.    Your care team:                            Family Medicine Internal Medicine   MD Artie Holm MD Shantel Branch-Fleming, MD Katya Georgiev PA-C Megan Hill, APRN CNP    Alejandro Tinsley, MD Pediatrics   Sammy Morris, ANABELLA Perdomo, MD Angelia Rust APRN CNP   MD Maxine Holland MD Deborah Mielke, MD Ratna Calhoun, APRN [facility]      Clinic hours: Monday - Thursday 7 am-7 pm; Fridays 7 am-5 pm.   Urgent care: Monday - Friday 11 am-9 pm; Saturday and Sunday 9 am-5 pm.  Pharmacy : Monday -Thursday 8 am-8 pm; Friday 8 am-6 pm; Saturday and Sunday 9 am-5 pm.     Clinic: [phone]   Pharmacy: [phone]        Viral Upper Respiratory Illness (Adult)  You have a viral upper respiratory illness (URI), which is another term for the common cold. This illness is contagious during the first few days. It is spread through the air by coughing and sneezing. It may also be spread by direct contact (touching the sick person and then touching your own eyes, nose, or mouth). Frequent handwashing will decrease risk of spread. Most viral illnesses go away within 7 to 10 days with rest and simple home remedies. Sometimes the illness may last for several weeks. Antibiotics will not kill a virus, and they are generally not prescribed for this condition.    Home care    If symptoms are severe, rest at home for the first 2 to 3 days. When you resume activity, don't let yourself get too tired.    Avoid being exposed to cigarette smoke (yours or others ).    You may use acetaminophen or ibuprofen to control pain and fever, unless another medicine was prescribed. If you have chronic liver or kidney disease, have ever had a stomach ulcer or gastrointestinal  bleeding, or are taking blood-thinning medicines, talk with your healthcare provider before using these medicines. Aspirin should never be given to anyone under 18 years of age who is ill with a viral infection or fever. It may cause severe liver or brain damage.    Your appetite may be poor, so a light diet is fine. Avoid dehydration by drinking 6 to 8 glasses of fluids per day (water, soft drinks, juices, tea, or soup). Extra fluids will help loosen secretions in the nose and lungs.    Over-the-counter cold medicines will not shorten the length of time you re sick, but they may be helpful for the following symptoms: cough, sore throat, and nasal and sinus congestion. (Note: Do not use decongestants if you have high blood pressure.)  Follow-up care  Follow up with your healthcare provider, or as advised.  When to seek medical advice  Call your healthcare provider right away if any of these occur:    Cough with lots of colored sputum (mucus)    Severe headache; face, neck, or ear pain    Difficulty swallowing due to throat pain    Fever of 100.4 F (38 C) or higher, or as directed by your healthcare provider  Call 911  Call 911 if any of these occur:    Chest pain, shortness of breath, wheezing, or difficulty breathing    Coughing up blood    Inability to swallow due to throat pain  Date Last Reviewed: 9/13/2015 2000-2017 [employer]. [address] [place] [address], [place] [address]. All rights reserved. This information is not intended as a substitute for professional medical care. Always follow your healthcare professional's instructions.

## 2018-10-11 NOTE — MR AVS SNAPSHOT
After Visit Summary   10/11/2018    Nickie Garcia    MRN: [id number]           Patient Information     Date Of Birth          1980        Visit Information        Provider Department      10/11/2018 9:40 AM Linn Calhoun APRN CNP [facility]        Today's Diagnoses     Viral upper respiratory infection    -  1    Multiple sclerosis (H)        CARDIOVASCULAR SCREENING; LDL GOAL LESS THAN 160          Care Instructions    At [facility], we strive to deliver an exceptional experience to you, every time we see you.  If you receive a survey in the mail, please send us back your thoughts. We really do value your feedback.    Your care team:                            Family Medicine Internal Medicine   MD Artie Holm MD Shantel Branch-Fleming, MD Katya Georgiev PA-C Megan Hill, APRN CNP Nam Ho, MD Pediatrics   Sammy Morris, ANABELLA Perdomo, MD Angelia Rust APRMD Maxine Brothers CNP, MD Deborah Mielke, MD Kim Thein, APRN CNP      Clinic hours: Monday - Thursday 7 am-7 pm; Fridays 7 am-5 pm.   Urgent care: Monday - Friday 11 am-9 pm; Saturday and Sunday 9 am-5 pm.  Pharmacy : Monday -Thursday 8 am-8 pm; Friday 8 am-6 pm; Saturday and Sunday 9 am-5 pm.     Clinic: [phone]   Pharmacy: [phone]        Viral Upper Respiratory Illness (Adult)  You have a viral upper respiratory illness (URI), which is another term for the common cold. This illness is contagious during the first few days. It is spread through the air by coughing and sneezing. It may also be spread by direct contact (touching the sick person and then touching your own eyes, nose, or mouth). Frequent handwashing will decrease risk of spread. Most viral illnesses go away within 7 to 10 days with rest and simple home remedies. Sometimes the illness may last for several weeks. Antibiotics will not kill a  virus, and they are generally not prescribed for this condition.    Home care    If symptoms are severe, rest at home for the first 2 to 3 days. When you resume activity, don't let yourself get too tired.    Avoid being exposed to cigarette smoke (yours or others ).    You may use acetaminophen or ibuprofen to control pain and fever, unless another medicine was prescribed. If you have chronic liver or kidney disease, have ever had a stomach ulcer or gastrointestinal bleeding, or are taking blood-thinning medicines, talk with your healthcare provider before using these medicines. Aspirin should never be given to anyone under 18 years of age who is ill with a viral infection or fever. It may cause severe liver or brain damage.    Your appetite may be poor, so a light diet is fine. Avoid dehydration by drinking 6 to 8 glasses of fluids per day (water, soft drinks, juices, tea, or soup). Extra fluids will help loosen secretions in the nose and lungs.    Over-the-counter cold medicines will not shorten the length of time you re sick, but they may be helpful for the following symptoms: cough, sore throat, and nasal and sinus congestion. (Note: Do not use decongestants if you have high blood pressure.)  Follow-up care  Follow up with your healthcare provider, or as advised.  When to seek medical advice  Call your healthcare provider right away if any of these occur:    Cough with lots of colored sputum (mucus)    Severe headache; face, neck, or ear pain    Difficulty swallowing due to throat pain    Fever of 100.4 F (38 C) or higher, or as directed by your healthcare provider  Call 911  Call 911 if any of these occur:    Chest pain, shortness of breath, wheezing, or difficulty breathing    Coughing up blood    Inability to swallow due to throat pain  Date Last Reviewed: 9/13/2015 2000-2017 [employer] [facility]. [address] [place] [address], [place] [address]. All rights reserved. This information is not intended as a  "substitute for professional medical care. Always follow your healthcare professional's instructions.                Follow-ups after your visit        Future tests that were ordered for you today     Open Future Orders        Priority Expected Expires Ordered    Lipid panel reflex to direct LDL Fasting Routine  10/11/2019 10/11/2018            Who to contact     If you have questions or need follow up information about today's clinic visit or your schedule please contact [facility] directly at [phone].  Normal or non-critical lab and imaging results will be communicated to you by [employer], letter or phone within 4 business days after the clinic has received the results. If you do not hear from us within 7 days, please contact the clinic through [employer] or phone. If you have a critical or abnormal lab result, we will notify you by phone as soon as possible.  Submit refill requests through [employer] or call your pharmacy and they will forward the refill request to us. Please allow 3 business days for your refill to be completed.          Additional Information About Your Visit        [employer] Information     [employer] gives you secure access to your electronic health record. If you see a primary care provider, you can also send messages to your care team and make appointments. If you have questions, please call your primary care clinic.  If you do not have a primary care provider, please call [phone] and they will assist you.        Care EveryWhere ID     This is your Care EveryWhere ID. This could be used by other organizations to access your [place] medical records  [id number]        Your Vitals Were     Pulse Temperature Height Last Period Pulse Oximetry BMI (Body Mass Index)    114 97.3  F (36.3  C) (Tympanic) 5' 8\" (1.727 m) 10/04/2018 100% 29.8 kg/m2       Blood Pressure from Last 3 Encounters:   10/11/18 138/79   08/30/18 120/78   07/26/18 128/89    Weight from Last 3 Encounters: "   10/11/18 196 lb (88.9 kg)   08/30/18 188 lb (85.3 kg)   07/26/18 188 lb 3.2 oz (85.4 kg)               Primary Care Provider Office Phone # Fax #    Tracie Carrillo PA-C [phone]       [address] [address]        Equal Access to Services     FEI ALFONSO : Hadii aad ku hadasho Soomaali, waaxda luqadaha, qaybta kaalmada adeegyada, waxay idiin hayaan adeeg kharash la'aan ah. So [facility] [phone].    ATENCIÓN: Si juanyla saravanan, tiene a luong disposición servicios gratuitos de asistencia lingüística. Bryant al [phone].    We comply with applicable federal civil rights laws and [place] laws. We do not discriminate on the basis of race, color, national origin, age, disability, sex, sexual orientation, or gender identity.            Thank you!     Thank you for choosing [facility]  for your care. Our goal is always to provide you with excellent care. Hearing back from our patients is one way we can continue to improve our services. Please take a few minutes to complete the written survey that you may receive in the mail after your visit with us. Thank you!             Your Updated Medication List - Protect others around you: Learn how to safely use, store and throw away your medicines at www.disposemymeds.org.          This list is accurate as of 10/11/18  9:54 AM.  Always use your most recent med list.                   Brand Name Dispense Instructions for use Diagnosis    Glatiramer Acetate 40 MG/ML Sosy           levothyroxine 137 MCG tablet    SYNTHROID/LEVOTHROID    30 tablet    Take 1 tablet (137 mcg) by mouth daily On an empty stomach 30 minutes prior to eating    Hypothyroidism due to Hashimoto's thyroiditis       PROBIOTIC DAILY PO           * varenicline 1 MG tablet    CHANTIX     Take 1 mg by mouth        * varenicline 0.5 MG X 11 & 1 MG X 42 tablet    CHANTIX STARTING MONTH BEHZAD    53 tablet    Take 0.5 mg tab daily for 3 days, then 0.5 mg  tab twice daily for 4 days, then 1 mg twice daily.    Smoker       * varenicline 1 MG tablet    CHANTIX    56 tablet    Take 1 tablet (1 mg) by mouth 2 times daily    Smoker       venlafaxine 75 MG tablet    EFFEXOR    30 tablet    Take 1 tablet (75 mg) by mouth daily    Generalized anxiety disorder       * Notice:  This list has 3 medication(s) that are the same as other medications prescribed for you. Read the directions carefully, and ask your doctor or other care provider to review them with you.

## 2018-10-11 NOTE — PROGRESS NOTES
SUBJECTIVE:   Nickie Garcia is a 38 year old female who presents to clinic today for the following health issues:    RESPIRATORY SYMPTOMS      Duration: since monday    Description  nasal congestion, rhinorrhea, cough, fever, chills, headache, fatigue/malaise and hoarse voice, chest tightness and achy    Severity: severe    Accompanying signs and symptoms: None    History (predisposing factors):  none    Precipitating or alleviating factors: None    Therapies tried and outcome:  Acetaminophen, advil      Problem list and histories reviewed & adjusted, as indicated.  Additional history: as documented    Patient Active Problem List   Diagnosis     CARDIOVASCULAR SCREENING; LDL GOAL LESS THAN 160     Hashimotos thyroiditis     Vitamin D deficiency     Fatigue     Anxiety disorder     Multiple sclerosis (H)     History of abnormal cervical Pap smear     Smoker     Past Surgical History:   Procedure Laterality Date     HC TOOTH EXTRACTION W/FORCEP       PE TUBES       TONSILLECTOMY & ADENOIDECTOMY         Social History   Substance Use Topics     Smoking status: Former Smoker     Smokeless tobacco: Never Used     Alcohol use No     Family History   Problem Relation Age of Onset     Hypertension Father      Cerebrovascular Disease Father 54     hemorrhagic stroke     Neurologic Disorder Mother      ms     No Known Problems Sister      Cancer Maternal Grandmother      brain     Multiple Sclerosis Maternal Grandfather      HEART DISEASE Paternal Grandmother      chf     Diabetes Paternal Grandfather      Diabetes Other      Cancer Paternal Uncle      cancer in eye and kidney         Current Outpatient Prescriptions   Medication Sig Dispense Refill     Glatiramer Acetate 40 MG/ML SOSY        levothyroxine (SYNTHROID/LEVOTHROID) 137 MCG tablet Take 1 tablet (137 mcg) by mouth daily On an empty stomach 30 minutes prior to eating 30 tablet 0     Probiotic Product (PROBIOTIC DAILY PO)        varenicline (CHANTIX  "STARTING MONTH PAK) 0.5 MG X 11 & 1 MG X 42 tablet Take 0.5 mg tab daily for 3 days, then 0.5 mg tab twice daily for 4 days, then 1 mg twice daily. 53 tablet 0     varenicline (CHANTIX) 1 MG tablet Take 1 tablet (1 mg) by mouth 2 times daily 56 tablet 2     varenicline (CHANTIX) 1 MG tablet Take 1 mg by mouth       venlafaxine (EFFEXOR) 75 MG tablet Take 1 tablet (75 mg) by mouth daily 30 tablet 11     BP Readings from Last 3 Encounters:   10/11/18 138/79   08/30/18 120/78   07/26/18 128/89    Wt Readings from Last 3 Encounters:   10/11/18 196 lb (88.9 kg)   08/30/18 188 lb (85.3 kg)   07/26/18 188 lb 3.2 oz (85.4 kg)                    Reviewed and updated as needed this visit by clinical staff  Tobacco  Allergies  Meds  Med Hx  Surg Hx  Fam Hx  Soc Hx      Reviewed and updated as needed this visit by Provider         ROS:  Constitutional, HEENT, cardiovascular, pulmonary, gi and gu systems are negative, except as otherwise noted.    OBJECTIVE:     /79 (BP Location: Left arm, Patient Position: Chair, Cuff Size: Adult Large)  Pulse 114  Temp 97.3  F (36.3  C) (Tympanic)  Ht 5' 8\" (1.727 m)  Wt 196 lb (88.9 kg)  LMP 10/04/2018  SpO2 100%  BMI 29.8 kg/m2  Body mass index is 29.8 kg/(m^2).  GENERAL: healthy, alert and no distress  EYES: Eyes grossly normal to inspection, PERRL and conjunctivae and sclerae normal  HENT: ear canals and TM's normal, nose and mouth without ulcers or lesions  NECK: no adenopathy, no asymmetry, masses, or scars and thyroid normal to palpation  RESP: lungs clear to auscultation - no rales, rhonchi or wheezes  CV: regular rate and rhythm, normal S1 S2, no S3 or S4, no murmur, click or rub, no peripheral edema and peripheral pulses strong  ABDOMEN: soft, nontender, no hepatosplenomegaly, no masses and bowel sounds normal  MS: no gross musculoskeletal defects noted, no edema  SKIN: no suspicious lesions or rashes  NEURO: Normal strength and tone, mentation intact and speech " "normal  PSYCH: mentation appears normal, affect normal/bright  LYMPH: normal ant/post cervical, supraclavicular nodes    Diagnostic Test Results:  Results for orders placed or performed in visit on 07/30/18   TSH   Result Value Ref Range    TSH 3.55 0.40 - 4.00 mU/L   Vitamin D Deficiency Screening   Result Value Ref Range    Vitamin D Deficiency screening 42 20 - 75 ug/L       ASSESSMENT/PLAN:       BP Screening:   Last 3 BP Readings:    BP Readings from Last 3 Encounters:   10/11/18 138/79   08/30/18 120/78   07/26/18 128/89       The following was recommended to the patient:  Re-screen BP within a year and recommended lifestyle modifications  BMI:   Estimated body mass index is 29.8 kg/(m^2) as calculated from the following:    Height as of this encounter: 5' 8\" (1.727 m).    Weight as of this encounter: 196 lb (88.9 kg).         1. Viral upper respiratory infection    NON PRESCRIPTION TREATMENT UPPER RESPIRATORY INFECTION    Mucinex 600 mg 2 tabs bid  Increase humidity to 30-40% in bedroom at night - vaporizer  Avoid decongestant  Saline nasal spray as needed  Increase fluid intake  Benadryl 25mg 1/2 - 1 hour before bed time  Maintain 8 hr minimum of sleep at night  Robitussin DM cough gels for cough  Return to clinic  if no improvement or worsening symptoms after 7-10 days    2. Multiple sclerosis (H)  Followed by Neurology.    3. CARDIOVASCULAR SCREENING; LDL GOAL LESS THAN 160    - Lipid panel reflex to direct LDL Fasting; Future    See Patient Instructions    JUNO Brandt [facility]  "

## 2018-11-06 ENCOUNTER — OFFICE VISIT (OUTPATIENT)
Dept: FAMILY MEDICINE | Facility: CLINIC | Age: 38
End: 2018-11-06
Payer: COMMERCIAL

## 2018-11-06 VITALS
BODY MASS INDEX: 29.86 KG/M2 | SYSTOLIC BLOOD PRESSURE: 124 MMHG | DIASTOLIC BLOOD PRESSURE: 84 MMHG | OXYGEN SATURATION: 97 % | HEIGHT: 68 IN | RESPIRATION RATE: 18 BRPM | HEART RATE: 88 BPM | TEMPERATURE: 98.6 F | WEIGHT: 197 LBS

## 2018-11-06 DIAGNOSIS — M54.42 ACUTE BILATERAL LOW BACK PAIN WITH BILATERAL SCIATICA: Primary | ICD-10-CM

## 2018-11-06 DIAGNOSIS — M54.41 ACUTE BILATERAL LOW BACK PAIN WITH BILATERAL SCIATICA: Primary | ICD-10-CM

## 2018-11-06 PROCEDURE — 99213 OFFICE O/P EST LOW 20 MIN: CPT | Performed by: NURSE PRACTITIONER

## 2018-11-06 RX ORDER — PREDNISONE 20 MG/1
40 TABLET ORAL DAILY
Qty: 10 TABLET | Refills: 0 | Status: SHIPPED | OUTPATIENT
Start: 2018-11-06 | End: 2019-02-25

## 2018-11-06 ASSESSMENT — PAIN SCALES - GENERAL: PAINLEVEL: EXTREME PAIN (9)

## 2018-11-06 NOTE — MR AVS SNAPSHOT
After Visit Summary   11/6/2018    Nickie Garcia    MRN: [id number]           Patient Information     Date Of Birth          1980        Visit Information        Provider Department      11/6/2018 2:20 PM Willa Jackson NP [facility]        Today's Diagnoses     Acute bilateral low back pain with bilateral sciatica    -  1       Follow-ups after your visit        Follow-up notes from your care team     Return in about 7 days (around 11/13/2018), or if symptoms worsen or fail to improve.      Who to contact     If you have questions or need follow up information about today's clinic visit or your schedule please contact [facility] directly at [phone].  Normal or non-critical lab and imaging results will be communicated to you by MyChart, letter or phone within 4 business days after the clinic has received the results. If you do not hear from us within 7 days, please contact the clinic through [employer] or phone. If you have a critical or abnormal lab result, we will notify you by phone as soon as possible.  Submit refill requests through [employer] or call your pharmacy and they will forward the refill request to us. Please allow 3 business days for your refill to be completed.          Additional Information About Your Visit        MyChart Information     [employer] gives you secure access to your electronic health record. If you see a primary care provider, you can also send messages to your care team and make appointments. If you have questions, please call your primary care clinic.  If you do not have a primary care provider, please call [phone] and they will assist you.        Care EveryWhere ID     This is your Care EveryWhere ID. This could be used by other organizations to access your [place] medical records  [id number]        Your Vitals Were     Pulse Temperature Respirations Height Last Period Pulse Oximetry    88 98.6  F (37  C) (Oral) 18 1.727 m  "(5' 8\") 10/31/2018 97%    BMI (Body Mass Index)                   29.95 kg/m2            Blood Pressure from Last 3 Encounters:   11/06/18 124/84   10/11/18 138/79   08/30/18 120/78    Weight from Last 3 Encounters:   11/06/18 89.4 kg (197 lb)   10/11/18 88.9 kg (196 lb)   08/30/18 85.3 kg (188 lb)              Today, you had the following     No orders found for display         Today's Medication Changes          These changes are accurate as of 11/6/18  4:20 PM.  If you have any questions, ask your nurse or doctor.               Start taking these medicines.        Dose/Directions    predniSONE 20 MG tablet   Commonly known as:  DELTASONE   Used for:  Acute bilateral low back pain with bilateral sciatica   Started by:  Willa Jackson, JANNA        Dose:  40 mg   Take 2 tablets (40 mg) by mouth daily   Quantity:  10 tablet   Refills:  0            Where to get your medicines      These medications were sent to [facility] [address] [place] [address]     Phone:  [phone]     predniSONE 20 MG tablet                Primary Care Provider Office Phone # Fax #    Tracie Carrillo PA-C [phone]       [address] [address]        Equal Access to Services     FEI ALFONSO AH: Allan nixon hadasho Soshawn, waaxda luqadaha, qaybta kaalmada adeegyada, noelle churchill. So [facility] [phone].    ATENCIÓN: Si habla español, tiene a luong disposición servicios gratuitos de asistencia lingüística. Bryant al [phone].    We comply with applicable federal civil rights laws and [place] laws. We do not discriminate on the basis of race, color, national origin, age, disability, sex, sexual orientation, or gender identity.            Thank you!     Thank you for choosing [facility]  for your care. Our goal is always to provide you with excellent care. Hearing back from our " patients is one way we can continue to improve our services. Please take a few minutes to complete the written survey that you may receive in the mail after your visit with us. Thank you!             Your Updated Medication List - Protect others around you: Learn how to safely use, store and throw away your medicines at www.disposemymeds.org.          This list is accurate as of 11/6/18  4:20 PM.  Always use your most recent med list.                   Brand Name Dispense Instructions for use Diagnosis    Glatiramer Acetate 40 MG/ML Sosy           levothyroxine 137 MCG tablet    SYNTHROID/LEVOTHROID    30 tablet    Take 1 tablet (137 mcg) by mouth daily On an empty stomach 30 minutes prior to eating    Hypothyroidism due to Hashimoto's thyroiditis       predniSONE 20 MG tablet    DELTASONE    10 tablet    Take 2 tablets (40 mg) by mouth daily    Acute bilateral low back pain with bilateral sciatica       PROBIOTIC DAILY PO           * varenicline 0.5 MG X 11 & 1 MG X 42 tablet    CHANTIX STARTING MONTH BEHZAD    53 tablet    Take 0.5 mg tab daily for 3 days, then 0.5 mg tab twice daily for 4 days, then 1 mg twice daily.    Smoker       * varenicline 1 MG tablet    CHANTIX    56 tablet    Take 1 tablet (1 mg) by mouth 2 times daily    Smoker       venlafaxine 75 MG tablet    EFFEXOR    30 tablet    Take 1 tablet (75 mg) by mouth daily    Generalized anxiety disorder       VITAMIN D (CHOLECALCIFEROL) PO      Take 5,000 Units by mouth daily        * Notice:  This list has 2 medication(s) that are the same as other medications prescribed for you. Read the directions carefully, and ask your doctor or other care provider to review them with you.

## 2018-11-06 NOTE — PROGRESS NOTES
SUBJECTIVE:   Nickie Garcia is a 38 year old female who presents to clinic today for the following health issues:      Back Pain     Duration: last Tuesday-        Specific cause: none    Description:   Location of pain: low back right  Character of pain: sharp  Pain radiation:radiates into the right buttocks  New numbness or weakness in legs, not attributed to pain:  Weakness in legs    Intensity: Currently 5/10    History:   Pain interferes with job: No  History of back problems: yes   Any previous MRI or X-rays: None  Sees a specialist for back pain:  No  Therapies tried without relief: ibuprofens, cold and heat    Alleviating factors:   Improved by: nothing      Precipitating factors:  Worsened by: Lifting, Bending, Standing and Walking    Accompanying Signs & Symptoms:  Risk of Fracture:  None  Risk of Cauda Equina:  None  Risk of Infection:  None  Risk of Cancer:  None  Risk of Ankylosing Spondylitis:  Onset at age <35, male, AND morning back stiffness. no         bending over looking at something, and stood up and had sudden shooting pain. Usually this occurs when she lifts something heavy.   She is wondering about possible steroid burst to help her back pain.  She is then going to consider going to chiropractic in a week when things have improved and she has less pain.  She reports she has flareups like this a few times a year or every few years.  She has had x-rays but she cannot remember when.    Problem list and histories reviewed & adjusted, as indicated.  Additional history: as documented    Patient Active Problem List   Diagnosis     CARDIOVASCULAR SCREENING; LDL GOAL LESS THAN 160     Hashimotos thyroiditis     Vitamin D deficiency     Fatigue     Anxiety disorder     Multiple sclerosis (H)     History of abnormal cervical Pap smear     Smoker     Past Surgical History:   Procedure Laterality Date     HC TOOTH EXTRACTION W/FORCEP       PE TUBES       TONSILLECTOMY & ADENOIDECTOMY          Social History   Substance Use Topics     Smoking status: Current Every Day Smoker     Smokeless tobacco: Never Used     Alcohol use No     Family History   Problem Relation Age of Onset     Hypertension Father      Cerebrovascular Disease Father 54     hemorrhagic stroke     Neurologic Disorder Mother      ms     No Known Problems Sister      Cancer Maternal Grandmother      brain     Multiple Sclerosis Maternal Grandfather      HEART DISEASE Paternal Grandmother      chf     Diabetes Paternal Grandfather      Diabetes Other      Cancer Paternal Uncle      cancer in eye and kidney         Current Outpatient Prescriptions   Medication Sig Dispense Refill     Glatiramer Acetate 40 MG/ML SOSY        levothyroxine (SYNTHROID/LEVOTHROID) 137 MCG tablet Take 1 tablet (137 mcg) by mouth daily On an empty stomach 30 minutes prior to eating 30 tablet 0     predniSONE (DELTASONE) 20 MG tablet Take 2 tablets (40 mg) by mouth daily 10 tablet 0     Probiotic Product (PROBIOTIC DAILY PO)        varenicline (CHANTIX STARTING MONTH PAK) 0.5 MG X 11 & 1 MG X 42 tablet Take 0.5 mg tab daily for 3 days, then 0.5 mg tab twice daily for 4 days, then 1 mg twice daily. 53 tablet 0     varenicline (CHANTIX) 1 MG tablet Take 1 tablet (1 mg) by mouth 2 times daily 56 tablet 2     venlafaxine (EFFEXOR) 75 MG tablet Take 1 tablet (75 mg) by mouth daily 30 tablet 11     VITAMIN D, CHOLECALCIFEROL, PO Take 5,000 Units by mouth daily       Allergies   Allergen Reactions     Wellbutrin [Bupropion] Other (See Comments)     Optic neuritis       Reviewed and updated as needed this visit by clinical staff  Tobacco  Allergies  Meds  Problems  Med Hx  Surg Hx  Fam Hx  Soc Hx        Reviewed and updated as needed this visit by Provider  Allergies  Meds  Problems         ROS:  Constitutional, cardiovascular, pulmonary,  MS as above, otherwise systems are negative, except as otherwise noted.    OBJECTIVE:     /84 (BP Location: Right arm,  "Patient Position: Sitting, Cuff Size: Adult Regular)  Pulse 88  Temp 98.6  F (37  C) (Oral)  Resp 18  Ht 1.727 m (5' 8\")  Wt 89.4 kg (197 lb)  LMP 10/31/2018  SpO2 97%  BMI 29.95 kg/m2  Body mass index is 29.95 kg/(m^2).  GENERAL: healthy, alert and no distress  RESP: lungs clear to auscultation - no rales, rhonchi or wheezes  CV: regular rate and rhythm, normal S1 S2, no S3 or S4, no murmur, click or rub  MS: no gross musculoskeletal defects noted.  No pain with palpation.  Straight leg test negative.  But when she sits up she grimaces and appears to have more pain.  She reports that shoots down both of her legs.  Movement bending and twisting make it worse    Diagnostic Test Results:  No results found for this or any previous visit (from the past 24 hour(s)).    ASSESSMENT/PLAN:         1. Acute bilateral low back pain with bilateral sciatica  Call if things are not improving in 2-3 days.  Could consider extending the steroid to become a taper instead of just a burst.  - predniSONE (DELTASONE) 20 MG tablet; Take 2 tablets (40 mg) by mouth daily  Dispense: 10 tablet; Refill: 0    FUTURE APPOINTMENTS:       - Follow-up visit in 5-7 days as needed    JUNO Lipscomb, NP-C  [facility]    "

## 2018-11-20 DIAGNOSIS — E06.3 HYPOTHYROIDISM DUE TO HASHIMOTO'S THYROIDITIS: ICD-10-CM

## 2018-11-20 NOTE — TELEPHONE ENCOUNTER
"Requested Prescriptions   Pending Prescriptions Disp Refills     levothyroxine (SYNTHROID/LEVOTHROID) 137 MCG tablet    Last Written Prescription Date:  10/4/18  Last Fill Quantity: 30,  # refills: 0   Last Office Visit with G, P or [facility] prescribing provider:  11/6/18   Future Office Visit:      30 tablet 0     Sig: Take 1 tablet (137 mcg) by mouth daily On an empty stomach 30 minutes prior to eating    Thyroid Protocol Passed    11/20/2018  2:58 PM       Passed - Patient is 12 years or older       Passed - Recent (12 mo) or future (30 days) visit within the authorizing provider's specialty    Patient had office visit in the last 12 months or has a visit in the next 30 days with authorizing provider or within the authorizing provider's specialty.  See \"Patient Info\" tab in inbasket, or \"Choose Columns\" in Meds & Orders section of the refill encounter.             Passed - Normal TSH on file in past 12 months    Recent Labs   Lab Test  07/30/18   1510   TSH  3.55             Passed - No active pregnancy on record    If patient is pregnant or has had a positive pregnancy test, please check TSH.         Passed - No positive pregnancy test in past 12 months    If patient is pregnant or has had a positive pregnancy test, please check TSH.                Reynold Faarax  [facility]  "

## 2018-11-20 NOTE — LETTER
Nickie Garcia  [address]  [place] [facility] [address]          11/27/18      Dear Nickie Garcia    The refill request made by your pharmacy was received at the clinic for your Levothyroxine.     At this time you are due in the clinic for a Lab only appointment. A one time gisella refill has been sent to your pharmacy.     Please call your clinic to make a lab only appointment before you run out of medication. This will prevent a delay in your next month's refill.     [facility] [phone]     We invite you to refill your next prescription at a [facility] or take advantage of our prescription mail service.     Sincerely,     [facility] Care Team

## 2018-11-26 RX ORDER — LEVOTHYROXINE SODIUM 137 UG/1
137 TABLET ORAL DAILY
Qty: 30 TABLET | Refills: 0 | Status: SHIPPED | OUTPATIENT
Start: 2018-11-26 | End: 2019-02-25

## 2018-11-26 NOTE — TELEPHONE ENCOUNTER
Routing refill request to provider for review/approval because:  Payton given x1 and patient did not follow up, please advise for lab work      Jose Montana RN, BSN

## 2018-11-27 NOTE — TELEPHONE ENCOUNTER
Please call patient and have her come to lab for a thyroid check.  She did not read last [employer] message.  Thanks,  JUNO Ku CNP

## 2018-11-27 NOTE — TELEPHONE ENCOUNTER
"This writer attempted to contact Patient on 11/27/18    Reason for call Patient given a gisella refill and needs a lab only appointment and unable to leave message, \"mailbox full\". Sent letter.    If patient calls back:   Schedule Lab appointment within 2 weeks with lab.      Josette Berry MA    "

## 2019-02-25 ENCOUNTER — OFFICE VISIT (OUTPATIENT)
Dept: FAMILY MEDICINE | Facility: CLINIC | Age: 39
End: 2019-02-25
Payer: COMMERCIAL

## 2019-02-25 VITALS
RESPIRATION RATE: 18 BRPM | HEART RATE: 91 BPM | SYSTOLIC BLOOD PRESSURE: 131 MMHG | BODY MASS INDEX: 30.77 KG/M2 | HEIGHT: 68 IN | DIASTOLIC BLOOD PRESSURE: 87 MMHG | WEIGHT: 203 LBS | OXYGEN SATURATION: 99 % | TEMPERATURE: 97.8 F

## 2019-02-25 DIAGNOSIS — Z13.6 CARDIOVASCULAR SCREENING; LDL GOAL LESS THAN 130: ICD-10-CM

## 2019-02-25 DIAGNOSIS — F17.200 TOBACCO USE DISORDER: ICD-10-CM

## 2019-02-25 DIAGNOSIS — E06.3 HYPOTHYROIDISM DUE TO HASHIMOTO'S THYROIDITIS: Primary | ICD-10-CM

## 2019-02-25 DIAGNOSIS — G35 MULTIPLE SCLEROSIS (H): ICD-10-CM

## 2019-02-25 LAB
T3FREE SERPL-MCNC: 1.8 PG/ML (ref 2.3–4.2)
T4 FREE SERPL-MCNC: 0.65 NG/DL (ref 0.76–1.46)
TSH SERPL DL<=0.005 MIU/L-ACNC: 41.45 MU/L (ref 0.4–4)
TSH SERPL DL<=0.005 MIU/L-ACNC: 41.45 MU/L (ref 0.4–4)

## 2019-02-25 PROCEDURE — 99000 SPECIMEN HANDLING OFFICE-LAB: CPT | Performed by: NURSE PRACTITIONER

## 2019-02-25 PROCEDURE — 84432 ASSAY OF THYROGLOBULIN: CPT | Mod: 90 | Performed by: NURSE PRACTITIONER

## 2019-02-25 PROCEDURE — 36415 COLL VENOUS BLD VENIPUNCTURE: CPT | Performed by: NURSE PRACTITIONER

## 2019-02-25 PROCEDURE — 86376 MICROSOMAL ANTIBODY EACH: CPT | Performed by: NURSE PRACTITIONER

## 2019-02-25 PROCEDURE — 84443 ASSAY THYROID STIM HORMONE: CPT | Performed by: NURSE PRACTITIONER

## 2019-02-25 PROCEDURE — 84481 FREE ASSAY (FT-3): CPT | Performed by: NURSE PRACTITIONER

## 2019-02-25 PROCEDURE — 86800 THYROGLOBULIN ANTIBODY: CPT | Mod: 90 | Performed by: NURSE PRACTITIONER

## 2019-02-25 PROCEDURE — 99214 OFFICE O/P EST MOD 30 MIN: CPT | Performed by: NURSE PRACTITIONER

## 2019-02-25 PROCEDURE — 84439 ASSAY OF FREE THYROXINE: CPT | Performed by: NURSE PRACTITIONER

## 2019-02-25 RX ORDER — LEVOTHYROXINE SODIUM 137 UG/1
137 TABLET ORAL DAILY
Qty: 90 TABLET | Refills: 0 | Status: SHIPPED | OUTPATIENT
Start: 2019-02-25 | End: 2019-06-27

## 2019-02-25 ASSESSMENT — MIFFLIN-ST. JEOR: SCORE: 1644.3

## 2019-02-25 ASSESSMENT — PAIN SCALES - GENERAL: PAINLEVEL: NO PAIN (0)

## 2019-02-25 NOTE — PROGRESS NOTES
SUBJECTIVE:   Nickie Garcia is a 39 year old female who presents to clinic today for the following health issues:      Hypothyroidism Follow-up      Since last visit, patient describes the following symptoms: weight gain of 5 lbs, dry skin and hair loss      Amount of exercise or physical activity: None    Problems taking medications regularly: No    Medication side effects: diarrhea from Levothyroxine    Diet: regular (no restrictions)    Wt Readings from Last 5 Encounters:   02/25/19 92.1 kg (203 lb)   11/06/18 89.4 kg (197 lb)   10/11/18 88.9 kg (196 lb)   08/30/18 85.3 kg (188 lb)   07/26/18 85.4 kg (188 lb 3.2 oz)     Patient has been out of levothyroxine for 4-5 weeks. States this is due to her not caring for herself.  States she had severe diarrhea while taking levothyroxine even when TSH was in normal range.  Now that she's not taking it, bowels are normal.  She would like to try brand name synthroid due to this.  Also requesting T3, T4 and TPO antibodies.  She states the last time she had these done was upon diagnosis in 2002.      Problem list and histories reviewed & adjusted, as indicated.  Additional history: as documented    Patient Active Problem List   Diagnosis     CARDIOVASCULAR SCREENING; LDL GOAL LESS THAN 160     Hashimotos thyroiditis     Vitamin D deficiency     Fatigue     Anxiety disorder     Multiple sclerosis (H)     History of abnormal cervical Pap smear     Smoker     Past Surgical History:   Procedure Laterality Date     HC TOOTH EXTRACTION W/FORCEP       PE TUBES       TONSILLECTOMY & ADENOIDECTOMY         Social History     Tobacco Use     Smoking status: Current Every Day Smoker     Smokeless tobacco: Never Used   Substance Use Topics     Alcohol use: No     Family History   Problem Relation Age of Onset     Hypertension Father      Cerebrovascular Disease Father 54        hemorrhagic stroke     Neurologic Disorder Mother         ms     No Known Problems Sister      Cancer  "Maternal Grandmother         brain     Multiple Sclerosis Maternal Grandfather      Heart Disease Paternal Grandmother         chf     Diabetes Paternal Grandfather      Diabetes Other      Cancer Paternal Uncle         cancer in eye and kidney         Current Outpatient Medications   Medication Sig Dispense Refill     Glatiramer Acetate 40 MG/ML SOSY        levothyroxine (SYNTHROID) 137 MCG tablet Take 1 tablet (137 mcg) by mouth daily NAME BRAND ONLY 90 tablet 0     varenicline (CHANTIX) 1 MG tablet Take 1 tablet (1 mg) by mouth 2 times daily 56 tablet 2     venlafaxine (EFFEXOR) 75 MG tablet Take 1 tablet (75 mg) by mouth daily 30 tablet 11     VITAMIN D, CHOLECALCIFEROL, PO Take 5,000 Units by mouth daily       Probiotic Product (PROBIOTIC DAILY PO)        Allergies   Allergen Reactions     Wellbutrin [Bupropion] Other (See Comments)     Optic neuritis     Recent Labs   Lab Test 07/30/18  1510 09/14/12  0912 08/15/12  0935  05/30/12  1221 02/20/12  1707 12/30/11   LDL  --   --   --   --  142*  --   --    HDL  --   --   --   --  52  --   --    TRIG  --   --   --   --  113  --   --    ALT  --   --   --   --  13 26 62*   CR  --  0.76  --   --   --   --  0.85   GFRESTIMATED  --  88  --   --   --   --   --    GFRESTBLACK  --  >90  --   --   --   --   --    POTASSIUM  --  4.1  --   --   --   --  4.2   TSH 3.55  --  1.70   < > 5.74*  --   --     < > = values in this interval not displayed.        Reviewed and updated as needed this visit by clinical staff  Tobacco  Allergies  Meds  Med Hx  Surg Hx  Fam Hx  Soc Hx      Reviewed and updated as needed this visit by Provider  Tobacco  Allergies  Meds  Problems  Med Hx  Surg Hx  Fam Hx         ROS:  Constitutional, HEENT, cardiovascular, pulmonary, gi and gu systems are negative, except as otherwise noted.    OBJECTIVE:     /87   Pulse 91   Temp 97.8  F (36.6  C) (Oral)   Resp 18   Ht 1.727 m (5' 8\")   Wt 92.1 kg (203 lb)   LMP 02/24/2019 (Exact " Date)   SpO2 99%   Breastfeeding? No   BMI 30.87 kg/m    Body mass index is 30.87 kg/m .  GENERAL: healthy, alert and no distress  NECK: no adenopathy, no asymmetry, masses, or scars and thyroid normal to palpation  RESP: lungs clear to auscultation - no rales, rhonchi or wheezes  CV: regular rate and rhythm, normal S1 S2, no S3 or S4, no murmur, click or rub, no peripheral edema and peripheral pulses strong  ABDOMEN: soft, nontender, no hepatosplenomegaly, no masses and bowel sounds normal  MS: no gross musculoskeletal defects noted, no edema    Diagnostic Test Results:  No results found for this or any previous visit (from the past 24 hour(s)).    ASSESSMENT/PLAN:     1. Hypothyroidism due to Hashimoto's thyroiditis  See HPI.  Will get baseline TSH but patient to return 8 weeks after restarting medication to follow up on lab.  Synthroid versus levothyroxine ordered as requested.  Other labs were per patient request though discussed they are of little clinical significance once the diagnosis has been made.   - levothyroxine (SYNTHROID) 137 MCG tablet; Take 1 tablet (137 mcg) by mouth daily NAME BRAND ONLY  Dispense: 90 tablet; Refill: 0  - T4, free  - T3, Free  - Thyroid peroxidase antibody  - Thyroglobulin and antibody  - TSH  - TSH; Future    2. Tobacco use disorder  Precontemplative. Information given.     3. Multiple sclerosis (H)  Continues with neurology.  Feels well.      4. CARDIOVASCULAR SCREENING; LDL GOAL LESS THAN 130  Will come back fasting.    - Lipid panel reflex to direct LDL Fasting; Future    Patient Instructions       HOW TO QUIT SMOKING  Smoking is one of the hardest habits to break. About half of all those who have ever smoked have been able to quit, and most of those (about 70%) who still smoke want to quit. Here are some of the best ways to stop smoking.     KEEP TRYING:  It takes most smokers about 8 tries before they are finally able to fully quit. So, the more often you try and fail,  the better your chance of quitting the next time! So, don't give up!    GO COLD TURKEY:  Most ex-smokers quit cold turkey. Trying to cut back gradually doesn't seem to work as well, perhaps because it continues the smoking habit. Also, it is possible to fool yourself by inhaling more while smoking fewer cigarettes. This results in the same amount of nicotine in your body!    GET SUPPORT:  Support programs can make an important difference, especially for the heavy smoker. These groups offer lectures, methods to change your behavior and peer support. Call the free national Quitline for more information. 800-QUIT-NOW (125-585-8640). Low-cost or free programs are offered by many hospitals, local chapters of the American Lung Association (740-881-8285) and the American Cancer Society (008-791-4357). Support at home is important too. Non-smokers can help by offering praise and encouragement. If the smoker fails to quit, encourage them to try again!    OVER-THE-COUNTER MEDICINES:  For those who can't quit on their own, Nicotine Replacement Therapy (NRT) may make quitting much easier. Certain aids such as the nicotine patch, gum and lozenge are available without a prescription. However, it is best to use these under the guidance of your doctor. The skin patch provides a steady supply of nicotine to the body. Nicotine gum and lozenge gives temporary bursts of low levels of nicotine. Both methods take the edge off the craving for cigarettes. WARNING: If you feel symptoms of nicotine overdose, such as nausea, vomiting, dizziness, weakness, or fast heartbeat, stop using these and see your doctor.    PRESCRIPTION MEDICINES:  After evaluating your smoking patterns and prior attempts at quitting, your doctor may offer a prescription medicine such as bupropion (Zyban, Wellbutrin), varenicline (Chantix, Champix), a niocotine inhaler or nasal spray. Each has its unique advantage and side effects which your doctor can review with  you.    HEALTH BENEFITS OF QUITTING:  The benefits of quitting start right away and keep improving the longer you go without smokin minutes: blood pressure and pulse return to normal  8 hours: oxygen levels return to normal  2 days: ability to smell and taste begins to improve as damaged nerves start to regrow  2-3 weeks: circulation and lung function improves  1-9 months: decreased cough, congestion and shortness of breath; less tired  1 year: risk of heart attack decreases by half  5 years: risk of lung cancer decreases by half; risk of stroke becomes the same as a non-smoker  For information about how to quit smoking, visit the following links:  [employer] ,   Clearing the Air, Quit Smoking Today   - an online booklet. http://www.smokefree.gov/pubs/clearing_the_air.pdf  Smokefree.gov http://smokefree.gov/  QuitNet http://www.quitnet.com/    8630-1568 [facility], [address] [place] [address]. All rights reserved. This information is not intended as a substitute for professional medical care. Always follow your healthcare professional's instructions.    The Benefits of Living Smoke Free  What do you want to gain from quitting? Check off some reasons to quit.  Health Benefits  ___ Reduce my risk of lung cancer, heart disease, chronic lung disease  ___ Have fewer wrinkles and softer skin  ___ Improve my sense of taste and smell  ___ For pregnant women--reduce the risk of having a miscarriage, stillbirth, premature birth, or low-birth-weight baby  Personal Benefits  ___ Feel more in control of my life  ___ Have better-smelling hair, breath, clothes, home, and car  ___ Save time by not having to take smoke breaks, buy cigarettes, or hunt for a light  ___ Have whiter teeth  Family Benefits  ___ Reduce my children s respiratory tract infections  ___ Set a good example for my children  ___ Reduce my family s cancer risk  Financial Benefits  ___ Save hundreds of dollars each year that  would be spent on cigarettes  ___ Save money on medical bills  ___ Save on life, health, and car insurance premiums    Those Dollars Add Up!  Cigarettes are expensive, and getting more expensive all the time. Do you realize how much money you are spending on cigarettes per year? What is the average amount you spend on a pack of cigarettes? What is the average number of packs that you smoke per day? Using your answers to these questions, fill in this formula to help you find out:  ($ _____ per pack) ×  ( _____ number of packs per day) × (365 days) =  $ _____ yearly cost of smoking  Besides tobacco, there are other costs, including extra cleaning bills and replacement costs for clothing and furniture; medical expenses for smoking-related illnesses; and higher health, life, and car insurance premiums.    Cigars and Pipes Count Too!  Cigars and pipes are also dangerous. So are smokeless (chewing) tobacco and snuff. All of these products contain nicotine, a highly addictive substance that has harmful effects on your body. Quitting smoking means giving up all tobacco products.      2886-2552 [place] [address]. All rights reserved. This information is not intended as a substitute for professional medical care. Always follow your healthcare professional's instructions.      At [facility], we strive to deliver an exceptional experience to you, every time we see you.  If you receive a survey in the mail, please send us back your thoughts. We really do value your feedback.    Based on your medical history, these are the current health maintenance/preventive care services that you are due for (some may have been done at this visit.)  Health Maintenance Due   Topic Date Due     TOBACCO CESSATION COUNSELING Q1 YR  1980     PREVENTIVE CARE VISIT  1980     LIPID SCREENING Q5 YEARS  05/30/2017         Suggested websites for health  information:  Www.[facility].org : Up to date and easily searchable information on multiple topics.  Www.medlineplus.gov : medication info, interactive tutorials, watch real surgeries online  Www.familydoctor.org : good info from the Academy of Family Physicians  Www.cdc.gov : public health info, travel advisories, epidemics (H1N1)  Www.aap.org : children's health info, normal development, vaccinations  Www.health.[facility].mn.us : [place] dept of health, public health issues in [place], N1N1    Your care team:                            Family Medicine Internal Medicine   MD Artie Holm MD Shantel Branch-Fleming, MD Katya Georgiev PA-C Nam Ho, MD Pediatrics   ANABELLA Rodriguez, MD Maxine Richards CNP, MD Deborah Mielke, MD Kim Thein, APRN CNP      Clinic hours: Monday - Thursday 7 am-7 pm; Fridays 7 am-5 pm.   Urgent care: Monday - Friday 11 am-9 pm; Saturday and Sunday 9 am-5 pm.  Pharmacy : Monday -Thursday 8 am-8 pm; Friday 8 am-6 pm; Saturday and Sunday 9 am-5 pm.     Clinic: [phone]   Pharmacy: [phone]        Winter Perdomo, JUNO HUDSON  [facility]

## 2019-02-25 NOTE — PATIENT INSTRUCTIONS
HOW TO QUIT SMOKING  Smoking is one of the hardest habits to break. About half of all those who have ever smoked have been able to quit, and most of those (about 70%) who still smoke want to quit. Here are some of the best ways to stop smoking.     KEEP TRYING:  It takes most smokers about 8 tries before they are finally able to fully quit. So, the more often you try and fail, the better your chance of quitting the next time! So, don't give up!    GO COLD TURKEY:  Most ex-smokers quit cold turkey. Trying to cut back gradually doesn't seem to work as well, perhaps because it continues the smoking habit. Also, it is possible to fool yourself by inhaling more while smoking fewer cigarettes. This results in the same amount of nicotine in your body!    GET SUPPORT:  Support programs can make an important difference, especially for the heavy smoker. These groups offer lectures, methods to change your behavior and peer support. Call the free national Quitline for more information. 800-QUIT-NOW (675-376-9106). Low-cost or free programs are offered by many hospitals, local chapters of the American Lung Association (696-274-9964) and the American Cancer Society (341-201-6100). Support at home is important too. Non-smokers can help by offering praise and encouragement. If the smoker fails to quit, encourage them to try again!    OVER-THE-COUNTER MEDICINES:  For those who can't quit on their own, Nicotine Replacement Therapy (NRT) may make quitting much easier. Certain aids such as the nicotine patch, gum and lozenge are available without a prescription. However, it is best to use these under the guidance of your doctor. The skin patch provides a steady supply of nicotine to the body. Nicotine gum and lozenge gives temporary bursts of low levels of nicotine. Both methods take the edge off the craving for cigarettes. WARNING: If you feel symptoms of nicotine overdose, such as nausea, vomiting, dizziness, weakness, or fast  heartbeat, stop using these and see your doctor.    PRESCRIPTION MEDICINES:  After evaluating your smoking patterns and prior attempts at quitting, your doctor may offer a prescription medicine such as bupropion (Zyban, Wellbutrin), varenicline (Chantix, Champix), a niocotine inhaler or nasal spray. Each has its unique advantage and side effects which your doctor can review with you.    HEALTH BENEFITS OF QUITTING:  The benefits of quitting start right away and keep improving the longer you go without smokin minutes: blood pressure and pulse return to normal  8 hours: oxygen levels return to normal  2 days: ability to smell and taste begins to improve as damaged nerves start to regrow  2-3 weeks: circulation and lung function improves  1-9 months: decreased cough, congestion and shortness of breath; less tired  1 year: risk of heart attack decreases by half  5 years: risk of lung cancer decreases by half; risk of stroke becomes the same as a non-smoker  For information about how to quit smoking, visit the following links:  National Cancer Friendly ,   Clearing the Air, Quit Smoking Today   - an online booklet. http://www.smokefree.gov/pubs/clearing_the_air.pdf  Smokefree.gov http://smokefree.gov/  QuitNet http://www.quitnet.com/    1824-7140 [employer], [address] [place] [address], [place] [address]. All rights reserved. This information is not intended as a substitute for professional medical care. Always follow your healthcare professional's instructions.    The Benefits of Living Smoke Free  What do you want to gain from quitting? Check off some reasons to quit.  Health Benefits  ___ Reduce my risk of lung cancer, heart disease, chronic lung disease  ___ Have fewer wrinkles and softer skin  ___ Improve my sense of taste and smell  ___ For pregnant women--reduce the risk of having a miscarriage, stillbirth, premature birth, or low-birth-weight baby  Personal Benefits  ___ Feel more in control of my  life  ___ Have better-smelling hair, breath, clothes, home, and car  ___ Save time by not having to take smoke breaks, buy cigarettes, or hunt for a light  ___ Have whiter teeth  Family Benefits  ___ Reduce my children s respiratory tract infections  ___ Set a good example for my children  ___ Reduce my family s cancer risk  Financial Benefits  ___ Save hundreds of dollars each year that would be spent on cigarettes  ___ Save money on medical bills  ___ Save on life, health, and car insurance premiums    Those Dollars Add Up!  Cigarettes are expensive, and getting more expensive all the time. Do you realize how much money you are spending on cigarettes per year? What is the average amount you spend on a pack of cigarettes? What is the average number of packs that you smoke per day? Using your answers to these questions, fill in this formula to help you find out:  ($ _____ per pack) ×  ( _____ number of packs per day) × (365 days) =  $ _____ yearly cost of smoking  Besides tobacco, there are other costs, including extra cleaning bills and replacement costs for clothing and furniture; medical expenses for smoking-related illnesses; and higher health, life, and car insurance premiums.    Cigars and Pipes Count Too!  Cigars and pipes are also dangerous. So are smokeless (chewing) tobacco and snuff. All of these products contain nicotine, a highly addictive substance that has harmful effects on your body. Quitting smoking means giving up all tobacco products.      9657-0218 [address], [place] [address]. All rights reserved. This information is not intended as a substitute for professional medical care. Always follow your healthcare professional's instructions.      At [facility], we strive to deliver an exceptional experience to you, every time we see you.  If you receive a survey in the mail, please send us back your thoughts. We really do value your feedback.    Based  on your medical history, these are the current health maintenance/preventive care services that you are due for (some may have been done at this visit.)  Health Maintenance Due   Topic Date Due     TOBACCO CESSATION COUNSELING Q1 YR  1980     PREVENTIVE CARE VISIT  1980     LIPID SCREENING Q5 YEARS  05/30/2017         Suggested websites for health information:  Www.[employer].org : Up to date and easily searchable information on multiple topics.  Www.medlineplus.gov : medication info, interactive tutorials, watch real surgeries online  Www.familydoctor.org : good info from the Academy of Family Physicians  Www.cdc.gov : public health info, travel advisories, epidemics (H1N1)  Www.aap.org : children's health info, normal development, vaccinations  Www.health.[facility].[place].us : [place] dept of health, public health issues in [place], N1N1    Your care team:                            Family Medicine Internal Medicine   MD Artie Holm MD Shantel Branch-Fleming, MD Katya Georgiev PA-C Nam Ho, MD Pediatrics   ANABELLA Rodriguez, BECCA Maher APRN CNP   MD Maxine Holland MD Deborah Mielke, MD Kim Thein, APRN CNP      Clinic hours: Monday - Thursday 7 am-7 pm; Fridays 7 am-5 pm.   Urgent care: Monday - Friday 11 am-9 pm; Saturday and Sunday 9 am-5 pm.  Pharmacy : Monday -Thursday 8 am-8 pm; Friday 8 am-6 pm; Saturday and Sunday 9 am-5 pm.     Clinic: [phone]   Pharmacy: [phone]

## 2019-02-26 LAB — THYROPEROXIDASE AB SERPL-ACNC: 60 IU/ML

## 2019-03-06 ENCOUNTER — E-VISIT (OUTPATIENT)
Dept: FAMILY MEDICINE | Facility: CLINIC | Age: 39
End: 2019-03-06
Payer: COMMERCIAL

## 2019-03-06 DIAGNOSIS — J01.01 ACUTE RECURRENT MAXILLARY SINUSITIS: Primary | ICD-10-CM

## 2019-03-06 PROCEDURE — 99213 OFFICE O/P EST LOW 20 MIN: CPT | Performed by: NURSE PRACTITIONER

## 2019-03-06 NOTE — PATIENT INSTRUCTIONS
Thank you for choosing us for your care. I have placed an order for a prescription so that you can start treatment. View your full visit summary for details by clicking on the link below. Your pharmacist will able to address any questions you may have about the medication.     If you're not feeling better within 5-7 days, please schedule an appointment.  You can schedule an appointment right here in [facility], or call [phone]  If the visit is for the same symptoms as your e-visit, we'll refund the cost of your e-visit if seen within seven days.      Sinusitis (Antibiotic Treatment)    The sinuses are air-filled spaces within the bones of the face. They connect to the inside of the nose. Sinusitis is an inflammation of the tissue that lines the sinuses. Sinusitis can occur during a cold. It can also happen due to allergies to pollens and other particles in the air. Sinusitis can cause symptoms of sinus congestion and a feeling of fullness. A sinus infection causes fever, headache, and facial pain. There is often green or yellow fluid draining from the nose or into the back of the throat (post-nasal drip). You have been given antibiotics to treat this condition.  Home care    Take the full course of antibiotics as instructed. Do not stop taking them, even when you feel better.    Drink plenty of water, hot tea, and other liquids. This may help thin nasal mucus. It also may help your sinuses drain fluids.    Heat may help soothe painful areas of your face. Use a towel soaked in hot water. Or,  the shower and direct the warm spray onto your face. Using a vaporizer along with a menthol rub at night may also help soothe symptoms.     An expectorant with guaifenesin may help thin nasal mucus and help your sinuses drain fluids.    You can use an over-the-counter decongestant, unless a similar medicine was prescribed to you. Nasal sprays work the fastest. Use one that contains phenylephrine or oxymetazoline.  First blow your nose gently. Then use the spray. Do not use these medicines more often than directed on the label. If you do, your symptoms may get worse. You may also take pills that contain pseudoephedrine. Don t use products that combine multiple medicines. This is because side effects may be increased. Read labels. You can also ask the pharmacist for help. (People with high blood pressure should not use decongestants. They can raise blood pressure.)    Over-the-counter antihistamines may help if allergies contributed to your sinusitis.      Do not use nasal rinses or irrigation during an acute sinus infection, unless your healthcare provider tells you to. Rinsing may spread the infection to other areas in your sinuses.    Use acetaminophen or ibuprofen to control pain, unless another pain medicine was prescribed to you. If you have chronic liver or kidney disease or ever had a stomach ulcer, talk with your healthcare provider before using these medicines. (Aspirin should never be taken by anyone under age 18 who is ill with a fever. It may cause severe liver damage.)    Don't smoke. This can make symptoms worse.  Follow-up care  Follow up with your healthcare provider or our staff if you are better in 1 week.  When to seek medical advice  Call your healthcare provider if any of these occur:    Facial pain or headache that gets worse    Stiff neck    Unusual drowsiness or confusion    Swelling of your forehead or eyelids    Vision problems, such as blurred or double vision    Fever of 100.4 F (38 C) or higher, or as directed by your healthcare provider    Seizure    Breathing problems    Symptoms don't go away in 10 days  Prevention  Here are steps you can take to help prevent an infection:    Keep good hand washing habits.    Don t have close contact with people who have sore throats, colds, or other upper respiratory infections.    Don t smoke, and stay away from secondhand smoke.    Stay up to date with of  your vaccines.  Date Last Reviewed: 11/1/2017 2000-2018 [employer]. [address] [place] [address], [place] [address]. All rights reserved. This information is not intended as a substitute for professional medical care. Always follow your healthcare professional's instructions.

## 2019-03-13 ENCOUNTER — OFFICE VISIT (OUTPATIENT)
Dept: PEDIATRICS | Facility: CLINIC | Age: 39
End: 2019-03-13
Payer: COMMERCIAL

## 2019-03-13 VITALS
BODY MASS INDEX: 31.06 KG/M2 | SYSTOLIC BLOOD PRESSURE: 146 MMHG | OXYGEN SATURATION: 99 % | WEIGHT: 204.3 LBS | TEMPERATURE: 97.9 F | HEART RATE: 94 BPM | DIASTOLIC BLOOD PRESSURE: 87 MMHG

## 2019-03-13 DIAGNOSIS — N83.201 CYST OF RIGHT OVARY: ICD-10-CM

## 2019-03-13 DIAGNOSIS — N20.1 CALCULUS OF URETER: Primary | ICD-10-CM

## 2019-03-13 PROCEDURE — 99213 OFFICE O/P EST LOW 20 MIN: CPT | Performed by: FAMILY MEDICINE

## 2019-03-13 NOTE — PROGRESS NOTES
SUBJECTIVE:   Nickie Garcia is a 39 year old female who presents to clinic today for the following health issues:      ED/UC Followup:    Facility:  [facility]  Date of visit: 3/11/19   Reason for visit: flank pain, 5mm kidney stone found.  Current Status: pain on the right side of abdomen, pain when using the bathroom. Patient meant to schedule appt today with Urology department and was scheduled with Primary, patient wondering if there is anything provider can do to help with the pain.      Was seen at the [facility] ED and diagnosed with below  1   Obstructing 5 mm right ureterovesical junction calculus with associated mild right hydroureteronephrosis.    2.  Multiple bilateral nonobstructing renal calculi.    3.  Small 1.5 cm right adnexal cyst.    She was prescribed pain medications and flomax and states her pain is controlled. She denies fever or chills, her UA was reviewed and within normal limits    Problem list and histories reviewed & adjusted, as indicated.  Additional history: as documented    Patient Active Problem List   Diagnosis     CARDIOVASCULAR SCREENING; LDL GOAL LESS THAN 160     Hashimotos thyroiditis     Vitamin D deficiency     Fatigue     Anxiety disorder     Multiple sclerosis (H)     History of abnormal cervical Pap smear     Smoker     Past Surgical History:   Procedure Laterality Date     HC TOOTH EXTRACTION W/FORCEP       PE TUBES       TONSILLECTOMY & ADENOIDECTOMY         Social History     Tobacco Use     Smoking status: Current Every Day Smoker     Smokeless tobacco: Never Used   Substance Use Topics     Alcohol use: No     Family History   Problem Relation Age of Onset     Hypertension Father      Cerebrovascular Disease Father 54        hemorrhagic stroke     Neurologic Disorder Mother         ms     No Known Problems Sister      Cancer Maternal Grandmother         brain     Multiple Sclerosis Maternal Grandfather      Heart Disease Paternal Grandmother          chf     Diabetes Paternal Grandfather      Diabetes Other      Cancer Paternal Uncle         cancer in eye and kidney         Current Outpatient Medications   Medication Sig Dispense Refill     Glatiramer Acetate 40 MG/ML SOSY        levothyroxine (SYNTHROID) 137 MCG tablet Take 1 tablet (137 mcg) by mouth daily NAME BRAND ONLY 90 tablet 0     Probiotic Product (PROBIOTIC DAILY PO)        varenicline (CHANTIX) 1 MG tablet Take 1 tablet (1 mg) by mouth 2 times daily 56 tablet 2     venlafaxine (EFFEXOR) 75 MG tablet Take 1 tablet (75 mg) by mouth daily 30 tablet 11       Reviewed and updated as needed this visit by clinical staff       Reviewed and updated as needed this visit by Provider         ROS:  Constitutional, HEENT, cardiovascular, pulmonary, gi and gu systems are negative, except as otherwise noted.    OBJECTIVE:     /87 (BP Location: Right arm, Patient Position: Chair, Cuff Size: Adult Regular)   Pulse 94   Temp 97.9  F (36.6  C) (Temporal)   Wt 92.7 kg (204 lb 4.8 oz)   LMP 02/24/2019 (Exact Date)   SpO2 99%   BMI 31.06 kg/m    Body mass index is 31.06 kg/m .   GENERAL: healthy, alert and no distress  EYES: Eyes grossly normal to inspection, PERRL and conjunctivae and sclerae normal  HENT: ear canals and TM's normal, nose and mouth without ulcers or lesions  NECK: no adenopathy, no asymmetry, masses, or scars and thyroid normal to palpation  RESP: lungs clear to auscultation - no rales, rhonchi or wheezes  BREAST: normal without masses, tenderness or nipple discharge and no palpable axillary masses or adenopathy  CV: regular rate and rhythm, normal S1 S2, no S3 or S4, no murmur, click or rub, no peripheral edema and peripheral pulses strong  ABDOMEN: soft, nontender, no hepatosplenomegaly, no masses and bowel sounds ady  MS: no gross musculoskeletal defects noted, no edema    Diagnostic Test Results:  none     ASSESSMENT/PLAN   Nickie was seen today for hospital f/u.    Diagnoses and all  orders for this visit:    Calculus of ureter: Watchful waiting, was sent home with shay, advised to continued with pushing fluids, flomax and percocet prescribed at the ED. She states her pain is actually improving ans seems to have shifted from her flank down to her groin. I advised to wait for a couple of days for stone to pass and if not will set her up with urology      Cyst of right ovary: She denies any symptoms and will follow up with her primary.    She'll follow up if her symptoms remain persistent.    Misty Marcelino MD  [facility]

## 2019-03-13 NOTE — PATIENT INSTRUCTIONS
Patient Education     Kidney Stone with Pain    The sharp cramping pain on either side of your lower back and nausea/vomiting that you have are because of a small stone that has formed in the kidney. It is now passing down a narrow tube (ureter) on its way to your bladder. Once the stone reaches your bladder, the pain will often stop. But it may come back as the stone continues to pass out of the bladder and through the urethra. The stone may pass in your urine stream in one piece. The size may be 1/16 inch to 1/4 inch (1 mm to 6 mm). Or, the stone may break up into liam fragments that you may not even notice.  Once you have had a kidney stone, you are at risk of getting another one in the future. There are 4 types of kidney stones. Eighty percent are calcium stones--mostly calcium oxalate but also some with calcium phosphate. The other 3 types include uric acid stones, struvite stones (from a preceding infection), and rarely, cystine stones.  Most stones will pass on their own, but may take from a few hours to a few days. Sometimes the stone is too large to pass by itself. In that case, the healthcare provider will need to use other ways to remove the stone. These techniques include:    Lithotripsy. This uses ultrasound waves to break up the stone.    Ureteroscopy. This pushes a basket-like instrument through the urethra and bladder and into the ureter to pull out the stone.    Various types of direct surgery through the skin  Home care  The following are general care guidelines:    Drink plenty of fluids. This means at least 12, 8-ounce glasses of fluid--mostly water--a day.    Each time you urinate, do so in a jar. Pour the urine from the jar through the strainer and into the toilet. Continue doing this until 24 hours after your pain stops. By then, if there was a kidney stone, it should pass from your bladder. Some stones dissolve into sand-like particles and pass right through the strainer. In that case, you  won t ever see a stone.    Save any stone that you find in the strainer and bring it to your healthcare provider to look at. It may be possible to stop certain types of stones from forming. For this reason, it is important to know what kind of stone you have.    Try to stay as active as possible. This will help the stone pass. Don't stay in bed unless your pain keeps you from getting up. You may notice a red, pink, or brown color to your urine. This is normal while passing a kidney stone.    If you develop pain, you may take ibuprofen or naproxen for pain, unless another medicine was prescribed. If you have chronic liver or kidney disease, talk with your healthcare provider before taking these medicines. Also talk with your provider if you've had a stomach ulcer or GI bleeding.  Preventing stones  Each year for the next 5 to 7 years, you are at risk that a new stone will form. Your risk is a 50% chance over this time period. The risk is higher if you have a family history of kidney stones or have certain chronic illnesses like hypertension, obesity, or diabetes. But you can make changes to your lifestyle and diet that can lower your risk for another stone.  Most kidney stones are made of calcium. The following is advice for preventing another calcium stone. If you don t know the type of stone you have, follow this advice until the cause of your stone is found.  Things that help:    The most important thing you can do is to drink plenty of fluids each day. See home care above.     Eat foods that contain phytates. These include wheat, rice, rye, barley, and beans. Phytates are substances that may lower your risk for any type of stone to form.    Eat more fruits and vegetables. Choose those that are high in potassium.    Eat foods high in natural citrate like fruit and low-sugar fruit juices.    Having too little calcium in your diet can put you at risk for calcium kidney stones. Eat a normal amount of calcium in your  diet and talk with your healthcare provider if you are taking calcium supplements. Cutting back on your calcium intake may raise your risk. New research shows that eating calcium-rich and oxalate-rich foods together lowers your risk for stones by binding the minerals in the stomach and intestines before they can reach the kidneys.      Limit salt intake to 2 grams (1 teaspoon) per day. Use limited amounts when cooking, and don t add salt at the table. Processed and canned foods are usually high in salt.     Spinach, rhubarb, peanuts, cashews, almonds, grapefruit, and grapefruit juice are all high oxalate foods. You should limit how much of these you eat. Or eat them with calcium-rich foods. These include dairy products, dark leafy greens, soy products, and calcium-enriched foods.    Reducing the amount of animal meat and high protein foods in your diet may lower your risk for uric acid stones.    Avoid excess sugar (sucrose) and fructose (sweetener in many soft drinks) in your diet.     If you take vitamin C as a supplement, don't take more than 1,000 mg a day.    A dietitian or your healthcare provider can give you information about changes in your diet that will help prevent more kidney stones from forming.  Follow-up care  Follow up with your healthcare provider, or as advised, if the pain lasts more than 48 hours. Talk with your provider about urine and blood tests to find out the cause of your stone. If you had an X-ray, CT scan, or other diagnostic test, you will be told of any new findings that may affect your care.  Call 911  Call 911 if you have any of these:    Weakness, dizziness, or fainting  When to seek medical advice  Call your healthcare provider right away if any of these occur:    Pain that is not controlled by the medicine given    Repeated vomiting or unable to keep down fluids    Fever of 100.4 F (38 C) or higher, or as directed by your healthcare provider    Passage of solid red or brown urine  (can't see through it) or urine with lots of blood clots    Foul-smelling or cloudy urine    Unable to pass urine for 8 hours and increasing bladder pressure  Date Last Reviewed: 10/1/2016    4533-9376 [employer]. [address] [place] [address], [place] [address]. All rights reserved. This information is not intended as a substitute for professional medical care. Always follow your healthcare professional's instructions.

## 2019-03-19 LAB — LAB SCANNED RESULT: ABNORMAL

## 2019-04-04 ENCOUNTER — TELEPHONE (OUTPATIENT)
Dept: PEDIATRICS | Facility: CLINIC | Age: 39
End: 2019-04-04

## 2019-04-08 NOTE — TELEPHONE ENCOUNTER
Called to follow up on patient, she would need a urology appointment if her symptoms aren't resolved. Left a detailed VM.

## 2019-05-03 ENCOUNTER — OFFICE VISIT (OUTPATIENT)
Dept: FAMILY MEDICINE | Facility: CLINIC | Age: 39
End: 2019-05-03
Payer: COMMERCIAL

## 2019-05-03 VITALS
SYSTOLIC BLOOD PRESSURE: 128 MMHG | OXYGEN SATURATION: 100 % | BODY MASS INDEX: 31.02 KG/M2 | TEMPERATURE: 97.8 F | DIASTOLIC BLOOD PRESSURE: 82 MMHG | WEIGHT: 204 LBS | HEART RATE: 101 BPM

## 2019-05-03 DIAGNOSIS — E06.3 HYPOTHYROIDISM DUE TO HASHIMOTO'S THYROIDITIS: ICD-10-CM

## 2019-05-03 DIAGNOSIS — N83.201 RIGHT OVARIAN CYST: ICD-10-CM

## 2019-05-03 DIAGNOSIS — F17.200 TOBACCO USE DISORDER: ICD-10-CM

## 2019-05-03 DIAGNOSIS — M75.21 BICEPS TENDONITIS, RIGHT: Primary | ICD-10-CM

## 2019-05-03 DIAGNOSIS — E03.9 HYPOTHYROIDISM, UNSPECIFIED TYPE: Primary | ICD-10-CM

## 2019-05-03 DIAGNOSIS — Z13.6 CARDIOVASCULAR SCREENING; LDL GOAL LESS THAN 130: ICD-10-CM

## 2019-05-03 LAB
CHOLEST SERPL-MCNC: 206 MG/DL
HDLC SERPL-MCNC: 49 MG/DL
LDLC SERPL CALC-MCNC: 117 MG/DL
NONHDLC SERPL-MCNC: 157 MG/DL
TRIGL SERPL-MCNC: 201 MG/DL
TSH SERPL DL<=0.005 MIU/L-ACNC: 4.27 MU/L (ref 0.4–4)

## 2019-05-03 PROCEDURE — 80061 LIPID PANEL: CPT | Performed by: NURSE PRACTITIONER

## 2019-05-03 PROCEDURE — 36415 COLL VENOUS BLD VENIPUNCTURE: CPT | Performed by: NURSE PRACTITIONER

## 2019-05-03 PROCEDURE — 84443 ASSAY THYROID STIM HORMONE: CPT | Performed by: NURSE PRACTITIONER

## 2019-05-03 PROCEDURE — 99214 OFFICE O/P EST MOD 30 MIN: CPT | Performed by: NURSE PRACTITIONER

## 2019-05-03 ASSESSMENT — PAIN SCALES - GENERAL: PAINLEVEL: SEVERE PAIN (7)

## 2019-05-03 NOTE — PROGRESS NOTES
SUBJECTIVE:   Nickie Garcia is a 39 year old female who presents to clinic today for the following   health issues:      Hypothyroidism Follow-up      Since last visit, patient describes the following symptoms: Weight stable, no hair loss, no skin changes, no constipation, no loose stools      Amount of exercise or physical activity: None    Problems taking medications regularly: No    Medication side effects: none    Diet: regular (no restrictions)  No GI upset since restarting levothyroxine    Musculoskeletal problem/pain      Duration: x 3 weeks    Description  Location: right shoulder    Intensity:  7/10    Accompanying signs and symptoms: none    History  Previous similar problem: no   Previous evaluation:  none    Precipitating or alleviating factors:  Trauma or overuse: YES  Aggravating factors include: overuse    Therapies tried and outcome: rest/inactivity and Ibuprofen  Was doing heavy lifting with cleaning out garage  Abduction painful,worse throughout day  Different than neck pain she had 2 months ago which was on the left side      Ovarian cyst:    Seen on CT when patient had renal calculi mid-March.  Has history of cysts on right side around time of ovulation.  Denies pain now.    Additional history: as documented    Reviewed  and updated as needed this visit by clinical staff  Allergies  Meds         Reviewed and updated as needed this visit by Provider  Tobacco  Allergies  Meds  Med Hx  Surg Hx  Fam Hx  Soc Hx        Patient Active Problem List   Diagnosis     CARDIOVASCULAR SCREENING; LDL GOAL LESS THAN 160     Hashimotos thyroiditis     Vitamin D deficiency     Fatigue     Anxiety disorder     Multiple sclerosis (H)     History of abnormal cervical Pap smear     Smoker     Past Surgical History:   Procedure Laterality Date     HC TOOTH EXTRACTION W/FORCEP       PE TUBES       TONSILLECTOMY & ADENOIDECTOMY         Social History     Tobacco Use     Smoking status: Current Every Day  Smoker     Smokeless tobacco: Never Used   Substance Use Topics     Alcohol use: No     Family History   Problem Relation Age of Onset     Hypertension Father      Cerebrovascular Disease Father 54        hemorrhagic stroke     Neurologic Disorder Mother         ms     No Known Problems Sister      Cancer Maternal Grandmother         brain     Multiple Sclerosis Maternal Grandfather      Heart Disease Paternal Grandmother         chf     Diabetes Paternal Grandfather      Diabetes Other      Cancer Paternal Uncle         cancer in eye and kidney         Current Outpatient Medications   Medication Sig Dispense Refill     Glatiramer Acetate 40 MG/ML SOSY        levothyroxine (SYNTHROID) 137 MCG tablet Take 1 tablet (137 mcg) by mouth daily NAME BRAND ONLY 90 tablet 0     Probiotic Product (PROBIOTIC DAILY PO)        varenicline (CHANTIX) 1 MG tablet Take 1 tablet (1 mg) by mouth 2 times daily 56 tablet 2     venlafaxine (EFFEXOR) 75 MG tablet Take 1 tablet (75 mg) by mouth daily 30 tablet 11     Allergies   Allergen Reactions     Wellbutrin [Bupropion] Other (See Comments)     Optic neuritis     BP Readings from Last 3 Encounters:   05/03/19 128/82   03/13/19 146/87   02/25/19 131/87    Wt Readings from Last 3 Encounters:   05/03/19 92.5 kg (204 lb)   03/13/19 92.7 kg (204 lb 4.8 oz)   02/25/19 92.1 kg (203 lb)                    ROS:  Constitutional, HEENT, cardiovascular, pulmonary, gi and gu systems are negative, except as otherwise noted.    OBJECTIVE:     /82   Pulse 101   Temp 97.8  F (36.6  C) (Oral)   Wt 92.5 kg (204 lb)   LMP 04/25/2019 (Exact Date)   SpO2 100%   Breastfeeding? No   BMI 31.02 kg/m    Body mass index is 31.02 kg/m .  GENERAL: healthy, alert and no distress  NECK: no adenopathy, no asymmetry, masses, or scars and thyroid normal to palpation  RESP: lungs clear to auscultation - no rales, rhonchi or wheezes  CV: regular rate and rhythm, normal S1 S2, no S3 or S4, no murmur, click or  rub, no peripheral edema and peripheral pulses strong  ABDOMEN: soft, nontender, no hepatosplenomegaly, no masses and bowel sounds normal  MS: RUE exam shows normal strength and muscle mass, no deformities, ROM of all joints is full, no evidence of joint instability and has pain with abduction >90 degrees, and pain with abduction internal rotation as well as adduction/external rotation.      Diagnostic Test Results:  none     ASSESSMENT/PLAN:     1. Biceps tendonitis, right  Exam consistent with above. Advised NSAIDs, rest, ice.  Follow-up if not improving and will consider physical therapy referral.      2. Hypothyroidism due to Hashimoto's thyroiditis  Has been consistent with medication x 8 weeks.  Refills/medication change pending below.    - TSH    3. Tobacco use disorder  - TOBACCO CESSATION ORDER FOR     4. Right ovarian cyst  See HPI.  Was 1.5 cm on CT scan in March.  Currently asymptomatic.  Patient to follow up if develops pain in R pelvic region and we can do pelvic US.     5. CARDIOVASCULAR SCREENING; LDL GOAL LESS THAN 130  - Lipid panel reflex to direct LDL Fasting    Patient Instructions     Thyroid:  Will let you know later today if the dose is correct for your thyroid medication then will send refills    Ovarian cyst:  If right lower pelvis becomes painful, we can do ultrasound    For arm pain:  Likely tendonitis.  Rest and avoid painful movements.  Ice four times a day.  Take naproxen twice a day for the next 7-10 days.  If still painful, would recommend physical therapy.    Patient Education     Understanding Biceps Tendonitis    A tendon is a strong band of tissue that connects muscle to bone. The biceps muscle is in the front of the upper arm. It helps with movements such as bending the elbow or raising the arm. The upper end of the biceps muscle is called the proximal end. It has two tendons called the long head and the short head. These tendons attach the muscle to the bones in the shoulder.  Biceps tendonitis occurs when either of these tendons is irritated or red and swollen (inflamed). Most cases involve the long head.  Causes of biceps tendonitis  Causes can include:    Wear and tear of the tendon from aging or normal use over time    Overuse of the tendon from sports or work activities, especially those that involve repeated overhead movements    Injury to the tendon from a fall or other accident    Other problems in the shoulder, such as shoulder impingement or a rotator cuff tear  Symptoms of biceps tendonitis  Common symptoms include:    Pain in the front of the shoulder that may also travel down the arm. The pain may be worse with activity and at night.    Swelling in the shoulder    Clicking or catching sensation when using the arm and shoulder    Trouble moving the arm and shoulder  Treating biceps tendonitis  Treatment for biceps tendonitis may include:    Resting the arm and shoulder. This involves limiting certain movements, such as reaching above the head or raising the arm. These can slow healing and make symptoms worse. You may also need to limit certain sports and types of work for a time.    Cold therapy. This involves using items such as ice packs to help relieve symptoms. Cold can help reduce pain and swelling.    Medicines. These help relieve pain and swelling. NSAIDs (nonsteroidal anti-inflammatory drugs) are the most common medicines used. Medicines may be prescribed or bought over-the-counter. They may be given as pills. Or they may be applied to the skin in the form of a gel, cream, or patch.    Injections of medicine into the injured area. These help relieve pain and swelling for a time.    Physical therapy and exercises.  These help improve strength and range of motion in the arm and shoulder.  Possible complications    If the tendon isn t given time to heal, symptoms may worsen. Also, the tendon may tear (rupture).    If the tendon ruptures or doesn t get better with  treatment, your healthcare provider may recommend surgery. This most often involves repairing and reattaching the tendon.     When to call your healthcare provider  Call your healthcare provider right away if you have any of these:    Fever of 100.4 F (38 C) or higher, or as directed    Symptoms that don t get better with treatment, or get worse    Weakness or instability in the arm or shoulder    Sudden sharp pain, bruising, swelling, popping or snapping sensation, or bulge in the upper arm or shoulder    New symptoms   Date Last Reviewed: 3/10/2016    9053-3116 [employer]. [address] [place] [address]. All rights reserved. This information is not intended as a substitute for professional medical care. Always follow your healthcare professional's instructions.           Patient Education     Elbow Extension (Flexibility)    These instructions are for your right elbow. Switch sides for your left elbow.  1. Lie on your back on a bed, next to the edge. Let your right forearm and hand hang off the bed relaxed, palm up. Only your upper arm should be on the bed.  2. Gently straighten your arm fully until you feel a stretch in the elbow. Keep your hand relaxed.  3. Hold for 30 to 60 seconds. Then relax your arm.  4. Repeat  2 times.  5. Repeat this exercise 3 times a day, or as instructed.  Date Last Reviewed: 3/10/2016    4494-6635 [employer]. [address] [place] [address]. All rights reserved. This information is not intended as a substitute for professional medical care. Always follow your healthcare professional's instructions.           Patient Education     Neck Exercises: Arm Lift            To start, lie on your back, knees bent and feet flat on the floor. Keep your ears, shoulders, and hips aligned, but don t press your lower back to the floor. Rest your hands on your pelvis. Breathe deeply and relax. Tighten the belly muscles to keep the back from  arching.  Here are the steps for the arm lift:    Raise one arm overhead, then lower it. As you lower that arm, raise the other arm.    Continue to move both arms in slow, smooth arcs. Keep your arms straight and your head and neck relaxed.    Repeat 10 times with each arm.  For your safety, check with your healthcare provider before starting an exercise program.   Date Last Reviewed: 11/1/2017 2000-2018 [employer]. [address] [place] [address]. All rights reserved. This information is not intended as a substitute for professional medical care. Always follow your healthcare professional's instructions.           Patient Education     Shoulder Flexion (Flexibility)    1. Sit in a chair sideways next to a table. Lay your right arm on the table, pointed forward.  2. Slowly lean forward.  Slide your arm forward on the table. Feel the stretch in your right shoulder.  3. Hold for 5 seconds. Slowly sit back up.  4. Repeat 5 times.  5. Repeat this exercise 3 times a day, or as instructed.  Date Last Reviewed: 3/10/2016    1619-3063 [employer]. [address] [place] [address]. All rights reserved. This information is not intended as a substitute for professional medical care. Always follow your healthcare professional's instructions.               JUNO Ku [facility]

## 2019-05-03 NOTE — PATIENT INSTRUCTIONS
Thyroid:  Will let you know later today if the dose is correct for your thyroid medication then will send refills    Ovarian cyst:  If right lower pelvis becomes painful, we can do ultrasound    For arm pain:  Likely tendonitis.  Rest and avoid painful movements.  Ice four times a day.  Take naproxen twice a day for the next 7-10 days.  If still painful, would recommend physical therapy.    Patient Education     Understanding Biceps Tendonitis    A tendon is a strong band of tissue that connects muscle to bone. The biceps muscle is in the front of the upper arm. It helps with movements such as bending the elbow or raising the arm. The upper end of the biceps muscle is called the proximal end. It has two tendons called the long head and the short head. These tendons attach the muscle to the bones in the shoulder. Biceps tendonitis occurs when either of these tendons is irritated or red and swollen (inflamed). Most cases involve the long head.  Causes of biceps tendonitis  Causes can include:    Wear and tear of the tendon from aging or normal use over time    Overuse of the tendon from sports or work activities, especially those that involve repeated overhead movements    Injury to the tendon from a fall or other accident    Other problems in the shoulder, such as shoulder impingement or a rotator cuff tear  Symptoms of biceps tendonitis  Common symptoms include:    Pain in the front of the shoulder that may also travel down the arm. The pain may be worse with activity and at night.    Swelling in the shoulder    Clicking or catching sensation when using the arm and shoulder    Trouble moving the arm and shoulder  Treating biceps tendonitis  Treatment for biceps tendonitis may include:    Resting the arm and shoulder. This involves limiting certain movements, such as reaching above the head or raising the arm. These can slow healing and make symptoms worse. You may also need to limit certain sports and types of work  for a time.    Cold therapy. This involves using items such as ice packs to help relieve symptoms. Cold can help reduce pain and swelling.    Medicines. These help relieve pain and swelling. NSAIDs (nonsteroidal anti-inflammatory drugs) are the most common medicines used. Medicines may be prescribed or bought over-the-counter. They may be given as pills. Or they may be applied to the skin in the form of a gel, cream, or patch.    Injections of medicine into the injured area. These help relieve pain and swelling for a time.    Physical therapy and exercises.  These help improve strength and range of motion in the arm and shoulder.  Possible complications    If the tendon isn t given time to heal, symptoms may worsen. Also, the tendon may tear (rupture).    If the tendon ruptures or doesn t get better with treatment, your healthcare provider may recommend surgery. This most often involves repairing and reattaching the tendon.     When to call your healthcare provider  Call your healthcare provider right away if you have any of these:    Fever of 100.4 F (38 C) or higher, or as directed    Symptoms that don t get better with treatment, or get worse    Weakness or instability in the arm or shoulder    Sudden sharp pain, bruising, swelling, popping or snapping sensation, or bulge in the upper arm or shoulder    New symptoms   Date Last Reviewed: 3/10/2016    6462-0240 [employer]. [address] [place] [address], [place] [address]. All rights reserved. This information is not intended as a substitute for professional medical care. Always follow your healthcare professional's instructions.           Patient Education     Elbow Extension (Flexibility)    These instructions are for your right elbow. Switch sides for your left elbow.  1. Lie on your back on a bed, next to the edge. Let your right forearm and hand hang off the bed relaxed, palm up. Only your upper arm should be on the bed.  2. Gently straighten your arm  fully until you feel a stretch in the elbow. Keep your hand relaxed.  3. Hold for 30 to 60 seconds. Then relax your arm.  4. Repeat  2 times.  5. Repeat this exercise 3 times a day, or as instructed.  Date Last Reviewed: 3/10/2016    1699-6967 [employer]. [address] [place] [address]. All rights reserved. This information is not intended as a substitute for professional medical care. Always follow your healthcare professional's instructions.           Patient Education     Neck Exercises: Arm Lift            To start, lie on your back, knees bent and feet flat on the floor. Keep your ears, shoulders, and hips aligned, but don t press your lower back to the floor. Rest your hands on your pelvis. Breathe deeply and relax. Tighten the belly muscles to keep the back from arching.  Here are the steps for the arm lift:    Raise one arm overhead, then lower it. As you lower that arm, raise the other arm.    Continue to move both arms in slow, smooth arcs. Keep your arms straight and your head and neck relaxed.    Repeat 10 times with each arm.  For your safety, check with your healthcare provider before starting an exercise program.   Date Last Reviewed: 11/1/2017 2000-2018 [employer]. [address] [place] [address]. All rights reserved. This information is not intended as a substitute for professional medical care. Always follow your healthcare professional's instructions.           Patient Education     Shoulder Flexion (Flexibility)    1. Sit in a chair sideways next to a table. Lay your right arm on the table, pointed forward.  2. Slowly lean forward.  Slide your arm forward on the table. Feel the stretch in your right shoulder.  3. Hold for 5 seconds. Slowly sit back up.  4. Repeat 5 times.  5. Repeat this exercise 3 times a day, or as instructed.  Date Last Reviewed: 3/10/2016    9430-8150 [employer]. [address] [place] [address].  All rights reserved. This information is not intended as a substitute for professional medical care. Always follow your healthcare professional's instructions.

## 2019-05-30 ENCOUNTER — RECORDS - HEALTHEAST (OUTPATIENT)
Dept: LAB | Facility: HOSPITAL | Age: 39
End: 2019-05-30

## 2019-06-03 LAB
GAMMA INTERFERON BACKGROUND BLD IA-ACNC: 0.05 IU/ML
M TB IFN-G BLD-IMP: NEGATIVE
MITOGEN IGNF BCKGRD COR BLD-ACNC: -0.02 IU/ML
MITOGEN IGNF BCKGRD COR BLD-ACNC: 0.01 IU/ML
QTF INTERPRETATION: NORMAL
QTF MITOGEN - NIL: 8.71 IU/ML

## 2019-06-11 ENCOUNTER — E-VISIT (OUTPATIENT)
Dept: FAMILY MEDICINE | Facility: CLINIC | Age: 39
End: 2019-06-11
Payer: COMMERCIAL

## 2019-06-11 DIAGNOSIS — N89.8 VAGINAL DISCHARGE: Primary | ICD-10-CM

## 2019-06-27 DIAGNOSIS — E06.3 HYPOTHYROIDISM DUE TO HASHIMOTO'S THYROIDITIS: ICD-10-CM

## 2019-06-27 RX ORDER — LEVOTHYROXINE SODIUM 137 UG/1
137 TABLET ORAL DAILY
Qty: 90 TABLET | Refills: 0 | Status: SHIPPED | OUTPATIENT
Start: 2019-06-27 | End: 2019-09-05

## 2019-06-27 NOTE — TELEPHONE ENCOUNTER
"Requested Prescriptions   Pending Prescriptions Disp Refills     levothyroxine (SYNTHROID) 137 MCG tablet 90 tablet 0     Sig: Take 1 tablet (137 mcg) by mouth daily NAME BRAND ONLY       Thyroid Protocol Failed - 6/27/2019 10:48 AM        Failed - Normal TSH on file in past 12 months     Recent Labs   Lab Test 05/03/19  0731   TSH 4.27*              Passed - Patient is 12 years or older        Passed - Recent (12 mo) or future (30 days) visit within the authorizing provider's specialty     Patient had office visit in the last 12 months or has a visit in the next 30 days with authorizing provider or within the authorizing provider's specialty.  See \"Patient Info\" tab in inbasket, or \"Choose Columns\" in Meds & Orders section of the refill encounter.              Passed - Medication is active on med list        Passed - No active pregnancy on record     If patient is pregnant or has had a positive pregnancy test, please check TSH.          Passed - No positive pregnancy test in past 12 months     If patient is pregnant or has had a positive pregnancy test, please check TSH.          Last Written Prescription Date:  2/25/19  Last Fill Quantity: 90,  # refills: 0   Last office visit: 5/3/2019 with prescribing provider:  farhad   Future Office Visit:      "

## 2019-06-27 NOTE — TELEPHONE ENCOUNTER
Provider noted on the May lab to continue dose for 3 months and then recheck. Sent medication to last till that time.  Eduardo Fu,   I wanted to let you know that your cholesterol is normal for you. Your thyroid is very close to being in the normal range.  I am hesitant to change your dose right now.  Why don't we plan for you to come in for a lab-only visit in 3 months to recheck it.  We can adjust it if still slightly elevated at that time.   Please let us know if you have any questions.   Thank you!   JUNO Ku CNP       Tracie Tai RN, [facility] T[facility]

## 2019-07-15 ENCOUNTER — OFFICE VISIT (OUTPATIENT)
Dept: FAMILY MEDICINE | Facility: CLINIC | Age: 39
End: 2019-07-15
Payer: COMMERCIAL

## 2019-07-15 VITALS
DIASTOLIC BLOOD PRESSURE: 90 MMHG | WEIGHT: 205 LBS | TEMPERATURE: 98.8 F | SYSTOLIC BLOOD PRESSURE: 140 MMHG | BODY MASS INDEX: 31.07 KG/M2 | HEART RATE: 92 BPM | HEIGHT: 68 IN | OXYGEN SATURATION: 100 %

## 2019-07-15 DIAGNOSIS — I10 ESSENTIAL HYPERTENSION: Primary | ICD-10-CM

## 2019-07-15 DIAGNOSIS — Z72.0 TOBACCO ABUSE: ICD-10-CM

## 2019-07-15 PROCEDURE — 99214 OFFICE O/P EST MOD 30 MIN: CPT | Performed by: FAMILY MEDICINE

## 2019-07-15 RX ORDER — AMLODIPINE BESYLATE 5 MG/1
5 TABLET ORAL DAILY
Qty: 30 TABLET | Refills: 6 | Status: SHIPPED | OUTPATIENT
Start: 2019-07-15 | End: 2019-12-16

## 2019-07-15 RX ORDER — AMLODIPINE BESYLATE 5 MG/1
5 TABLET ORAL DAILY
Qty: 30 TABLET | Refills: 3 | Status: SHIPPED | OUTPATIENT
Start: 2019-07-15 | End: 2019-07-15

## 2019-07-15 ASSESSMENT — MIFFLIN-ST. JEOR: SCORE: 1659.82

## 2019-07-15 ASSESSMENT — PAIN SCALES - GENERAL: PAINLEVEL: NO PAIN (0)

## 2019-07-15 NOTE — PROGRESS NOTES
Subjective     Nickie Garcia is a 39 year old female who presents to clinic today for the following health issues:  Patient comes in today with concern of blood pressure has been on the high side.  She does have family history of hypertension.  She is smoker.    She reports her blood pressures been checked a few times and she has been averaging in the 140s over 90s.  At one time it was 150/99.  She has no chest pain denies headache.  Denies being lightheaded or dizzy.  Patient reports she does smoke half a pack per day.  She does have 1 cup of coffee and maybe one can of soda.  She does watch her salt intake.  Does not take any energy drink.    Patient is a smoker she would like to quit.  She reports in the past she tried Chantix however she quit taking it because of the side effect.  In addition nicotine patches have given the side effect and skin irritation.  She is currently using nicotine gum.    Denies lower extremity edema denies chest pain, shortness of breath, denies headache,  Hypertension Initial      Do you check your blood pressure regularly outside of the clinic? No     Are you following a low salt diet? Yes    Are your blood pressures ever more than 140 on the top number (systolic) OR more   than 90 on the bottom number (diastolic), for example 140/90? Yes    Amount of exercise or physical activity: None    Problems taking medications regularly: No    Medication side effects: none    Diet: regular (no restrictions)    Patient Active Problem List   Diagnosis     CARDIOVASCULAR SCREENING; LDL GOAL LESS THAN 160     Hashimotos thyroiditis     Vitamin D deficiency     Fatigue     Anxiety disorder     Multiple sclerosis (H)     History of abnormal cervical Pap smear     Smoker     Past Surgical History:   Procedure Laterality Date     HC TOOTH EXTRACTION W/FORCEP       PE TUBES       TONSILLECTOMY & ADENOIDECTOMY         Social History     Tobacco Use     Smoking status: Current Every Day Smoker      "Smokeless tobacco: Never Used   Substance Use Topics     Alcohol use: No     Family History   Problem Relation Age of Onset     Hypertension Father      Cerebrovascular Disease Father 54        hemorrhagic stroke     Neurologic Disorder Mother         ms     No Known Problems Sister      Cancer Maternal Grandmother         brain     Multiple Sclerosis Maternal Grandfather      Heart Disease Paternal Grandmother         chf     Diabetes Paternal Grandfather      Diabetes Other      Cancer Paternal Uncle         cancer in eye and kidney         Current Outpatient Medications   Medication Sig Dispense Refill     amLODIPine (NORVASC) 5 MG tablet Take 1 tablet (5 mg) by mouth daily 30 tablet 6     levothyroxine (SYNTHROID) 137 MCG tablet Take 1 tablet (137 mcg) by mouth daily NAME BRAND ONLY 90 tablet 0     nicotine (NICOTROL) 10 MG/ML SOLN inhalation solution Spray 1 spray in nostril every hour as needed for smoking cessation 1 Bottle 3     venlafaxine (EFFEXOR) 75 MG tablet Take 1 tablet (75 mg) by mouth daily 30 tablet 11     Glatiramer Acetate 40 MG/ML SOSY        Probiotic Product (PROBIOTIC DAILY PO)        varenicline (CHANTIX) 1 MG tablet Take 1 tablet (1 mg) by mouth 2 times daily (Patient not taking: Reported on 7/15/2019) 56 tablet 2     Allergies   Allergen Reactions     Wellbutrin [Bupropion] Other (See Comments)     Optic neuritis       Reviewed and updated as needed this visit by Provider         Review of Systems   ROS COMP: Constitutional, HEENT, cardiovascular, pulmonary, gi and gu systems are negative, except as otherwise noted.      Objective    BP (!) 139/91 (BP Location: Left arm, Patient Position: Chair, Cuff Size: Adult Regular)   Pulse 92   Temp 98.8  F (37.1  C) (Oral)   Ht 1.738 m (5' 8.41\")   Wt 93 kg (205 lb)   SpO2 100%   Breastfeeding? No   BMI 30.80 kg/m    Body mass index is 30.8 kg/m .  Physical Exam   GENERAL: healthy, alert and no distress  NECK: no adenopathy, no asymmetry, " "masses, or scars and thyroid normal to palpation  RESP: lungs clear to auscultation - no rales, rhonchi or wheezes  CV: regular rate and rhythm, normal S1 S2, no S3 or S4, no murmur, click or rub, no peripheral edema and peripheral pulses strong  ABDOMEN: soft, nontender, no hepatosplenomegaly, no masses and bowel sounds normal  MS: no gross musculoskeletal defects noted, no edema    Diagnostic Test Results:  Labs reviewed in Epic  none         Assessment & Plan     1. Essential hypertension  Advised patient with diet, exercise, weight loss.  She was advised to with low salt intake.  - amLODIPine (NORVASC) 5 MG tablet; Take 1 tablet (5 mg) by mouth daily  Dispense: 30 tablet; Refill: 6    2. Tobacco abuse  Patient was encouraged to quit smoking.  I gave her a prescription for nicotine spray in addition continue with nicotine gum.  - nicotine (NICOTROL) 10 MG/ML SOLN inhalation solution; Spray 1 spray in nostril every hour as needed for smoking cessation  Dispense: 1 Bottle; Refill: 3     Tobacco Cessation:   reports that she has been smoking.  She has never used smokeless tobacco.  Tobacco Cessation Action Plan: Medication Therapy Management  (Referral to MTM)  Self help information given to patient      BMI:   Estimated body mass index is 30.8 kg/m  as calculated from the following:    Height as of this encounter: 1.738 m (5' 8.41\").    Weight as of this encounter: 93 kg (205 lb).   Weight management plan: Discussed healthy diet and exercise guidelines        Patient Instructions       Patient Education     Tips for Using Less Salt    Most people with heart problems need to eat less salt (sodium). Reducing the amount of salt you eat may help control your blood pressure. The higher your blood pressure, the greater your risk for heart disease, stroke, blindness, and kidney problems.  At the store    Make low-salt choices by reading labels carefully. Look for the total amount of sodium per serving.    Use more fresh " food. Buy more fruits and vegetables. Select lean meats, fish, and poultry.    Use fewer frozen, canned, and packaged foods which often contain a lot of sodium.    Use plain frozen vegetables without sauces or toppings. These products are often low-sodium or no-sodium.    Choose reduced-sodium or no-salt-added versions of canned vegetables and soups.  In the kitchen    Don't add salt to food when you're cooking. Season with flavorings such as onion, garlic, pepper, salt-free herbal blends, and lemon or lime juice.    Use a cookbook containing low-salt recipes. It can give you ideas for tasty meals that are healthy for your heart.    Sprinkle salt-free herbal blends on vegetables and meat.    Drain and rinse canned foods, such as canned beans and vegetables, before cooking or eating.  Eating out    Tell the  you're on a low-salt diet. Ask questions about the menu.    Order fish, chicken, and meat broiled, baked, poached, or grilled without salt, butter, or breading.    Use lemon, pepper, and salt-free herb mixes to add flavor.    Choose plain steamed rice, boiled noodles, and baked or boiled potatoes. Top potatoes with chives and a little sour cream.     Beware! Salt goes by many other names. Limit foods with these words listed as ingredients: salt, sodium, soy sauce, baking soda, baking powder, MSG, monosodium, Na (the chemical symbol for sodium). Some antacids are also high in salt.   Date Last Reviewed: 6/1/2017 2000-2018 [employer]. [address] [place] [id number]. All rights reserved. This information is not intended as a substitute for professional medical care. Always follow your healthcare professional's instructions.           Patient Education     Low-Salt Diet  This diet removes foods that are high in salt. It also limits the amount of salt you use when cooking. It is most often used for people with high blood pressure, edema (fluid retention), and kidney, liver, or heart  disease.  Table salt contains the mineral sodium. Your body needs sodium to work normally. But too much sodium can make your health problems worse. Your healthcare provider is recommending a low-salt (also called low-sodium) diet for you. Your total daily allowance of salt is 1,500 to 2,300 milligrams (mg). It is less than 1 teaspoon of table salt. This means you can have only about 500 to 700 mg of sodium at each meal. People with certain health problems should limit salt intake to the lower end of the recommended range.    When you cook, don t add much salt. If you can cook without using salt, even better. Don t add salt to your food at the table.  When shopping, read food labels. Salt is often called sodium on the label. Choose foods that are salt-free, low salt, or very low salt. Note that foods with reduced salt may not lower your salt intake enough.    Beans, potatoes, and pasta  Ok: Dry beans, split peas, lentils, potatoes, rice, macaroni, pasta, spaghetti without added salt  Avoid: Potato chips, tortilla chips, and similar products  Breads and cereals  Ok: Low-sodium breads, rolls, cereals, and cakes; low-salt crackers, matzo crackers  Avoid: Salted crackers, pretzels, popcorn, Italian toast, pancakes, muffins  Dairy  Ok: Milk, chocolate milk, hot chocolate mix, low-salt cheeses, and yogurt  Avoid: Processed cheese and cheese spreads; Roquefort, Camembert, and cottage cheese; buttermilk, instant breakfast drink  Desserts  Ok: Ice cream, frozen yogurt, juice bars, gelatin, cookies and pies, sugar, honey, jelly, hard candy  Avoid: Most pies, cakes and cookies prepared or processed with salt; instant pudding  Drinks  Ok: Tea, coffee, fizzy (carbonated) drinks, juices  Avoid: Flavored coffees, electrolyte replacement drinks, sports drinks  Meats  Ok: All fresh meat, fish, poultry, low-salt tuna, eggs, egg substitute  Avoid: Smoked, pickled, brine-cured, or salted meats and fish. This includes minor, chipped beef,  corned beef, hot dogs, deli meats, ham, kosher meats, salt pork, sausage, canned tuna, salted codfish, smoked salmon, herring, sardines, or anchovies.  Seasonings and spices  Ok: Most seasonings are okay. Good substitutes for salt include: fresh herb blends, hot sauce, lemon, garlic, rivera, vinegar, dry mustard, parsley, cilantro, horseradish, tomato paste, regular margarine, mayonnaise, unsalted butter, cream cheese, vegetable oil, cream, low-salt salad dressing and gravy.  Avoid: Regular ketchup, relishes, pickles, soy sauce, teriyaki sauce, Worcestershire sauce, BBQ sauce, tartar sauce, meat tenderizer, chili sauce, regular gravy, regular salad dressing, salted butter  Soups  Ok: Low-salt soups and broths made with allowed foods  Avoid: Bouillon cubes, soups with smoked or salted meats, regular soup and broth  Vegetables  Ok: Most vegetables are okay; also low-salt tomato and vegetable juices  Avoid: Sauerkraut and other brine-soaked vegetables; pickles and other pickled vegetables; tomato juice, olives  Date Last Reviewed: 8/1/2016 2000-2018 [employer]. [address] [place] [address]. All rights reserved. This information is not intended as a substitute for professional medical care. Always follow your healthcare professional's instructions.           Patient Education     Understanding Smokeless Tobacco   Smokeless tobacco products are made from the tobacco plant. They are harmful to your body. Smokeless tobacco causes oral cancer, esophageal cancer, and pancreatic cancer. These products are not safer than other forms of tobacco. Tobacco is not safe in any form.   Smokeless tobacco is used by about 7 in 100 [place] adults. Most users are men. About 1 in 10 adolescent boys also use it.   What is smokeless tobacco?  Smokeless tobacco comes in 3 types. These are chewing tobacco, snuff, and dissolvables. Many of these products are flavored to make them more appealing.   Snuff is the most  widely used kind of smokeless tobacco. This finely ground tobacco can be dry or moist. The dry powder is sniffed. Moist snuff is put in the mouth between the gums and cheeks. Often packaged in round cans, it s called dip. Snuff also includes snus. This is moist tobacco sold in small pouches.   Chewing tobacco--or chew--is sold as loose leaves, plugs, or twists. A piece is pressed between the gums and cheek. Dissolvables are tobacco that has been made into small shapes, like lozenges or tablets. They are often sucked on until they melt.   How is smokeless tobacco bad for you?  All tobacco products contain dangerous chemicals. Tobacco use is not safe in any amount or in any form.   All forms of tobacco have nicotine in them. Nicotine is very addictive. You can become physically and emotionally hooked on it. You may even crave it. In children, nicotine can harm the developing brain.   Smokeless tobacco also has at least 30 harmful chemicals. The worst are tobacco-specific nitrosamines. These come from the way tobacco is processed. You may also be exposed to other chemicals like lead and mercury. All of these have been linked to cancer. If you use smokeless tobacco, you are more at risk for cancer of the mouth, esophagus, and pancreas.   Smokeless tobacco may also lead to other health problems. These include:    Heart disease and stroke    Small white patches in the mouth that can turn into cancer (leukoplakia)    Gum disease, tooth decay, and tooth loss    Stained teeth and bad breath    Early birth or miscarriage if used while pregnant    How can you quit smokeless tobacco?  Quitting any form of tobacco can be hard to do. The nicotine can cause physical, mental, and emotional withdrawal symptoms. You may have headaches, depression, and trouble sleeping. You may feel anxious, angry, tired, or restless. You may also feel the need to put something in your mouth to replace the chew, dip, or other smokeless tobacco  product.  If you want to quit, talk with your healthcare provider, pharmacist, or dentist. He or she can tell you about all your options. Many of the same tactics people use to quit cigarettes may help you. You may want to try a support group, a tobacco cessation program, medicine, or nicotine replacement therapy. Many resources are also available through your smart phone.      More resources    American Lung Association    [facility]    [employer] [facility] [place]   Date Last Reviewed: 5/1/2017 2000-2018 [employer]. [address] [place] [address]. All rights reserved. This information is not intended as a substitute for professional medical care. Always follow your healthcare professional's instructions.               No follow-ups on file.    Effie Santos MD  [facility]

## 2019-07-22 ENCOUNTER — TELEPHONE (OUTPATIENT)
Dept: FAMILY MEDICINE | Facility: CLINIC | Age: 39
End: 2019-07-22

## 2019-07-22 NOTE — TELEPHONE ENCOUNTER
Prior Authorization Retail Medication Request    Medication/Dose: Chantix 1MG CONT 2 PACK  ICD code (if different than what is on RX):    Previously Tried and Failed:    Rationale:      Insurance Name:  [facility]  Insurance ID:  [id number]       Pharmacy Information (if different than what is on RX)  Name:  [facility]   Phone:  [phone]    Priscilla Arrieta MA

## 2019-07-30 NOTE — TELEPHONE ENCOUNTER
Central Prior Authorization Team   Phone: [phone]      PA Initiation    Medication: Chantix 1MG CONT 2 PACK-Initiated  Insurance Company: [employer] - Phone [phone] Fax [phone]  Pharmacy Filling the Rx: [facility] #[id number] - [place] [address]  Filling Pharmacy Phone: [phone]  Filling Pharmacy Fax:    Start Date: 7/30/2019

## 2019-07-31 NOTE — TELEPHONE ENCOUNTER
Prior Authorization Not Needed per Insurance    Medication: Chantix 1MG CONT 2 PACK-PA NOT NEEDED is covered under patients formulary plan.    Pharmacy Notified: Yes  Patient Notified: No    Please close encounter when finished.    Thank you,  Central Prior Authorization Team  [phone]    Called pharmacy and medication is filled and ready for .

## 2019-09-05 ENCOUNTER — OFFICE VISIT (OUTPATIENT)
Dept: FAMILY MEDICINE | Facility: CLINIC | Age: 39
End: 2019-09-05
Payer: COMMERCIAL

## 2019-09-05 VITALS
HEART RATE: 76 BPM | DIASTOLIC BLOOD PRESSURE: 87 MMHG | WEIGHT: 206.4 LBS | HEIGHT: 68 IN | OXYGEN SATURATION: 98 % | SYSTOLIC BLOOD PRESSURE: 135 MMHG | TEMPERATURE: 97.4 F | BODY MASS INDEX: 31.28 KG/M2

## 2019-09-05 DIAGNOSIS — E06.3 HYPOTHYROIDISM DUE TO HASHIMOTO'S THYROIDITIS: ICD-10-CM

## 2019-09-05 DIAGNOSIS — Z13.1 SCREENING FOR DIABETES MELLITUS: ICD-10-CM

## 2019-09-05 DIAGNOSIS — G35 MULTIPLE SCLEROSIS (H): ICD-10-CM

## 2019-09-05 DIAGNOSIS — F41.1 GENERALIZED ANXIETY DISORDER: ICD-10-CM

## 2019-09-05 DIAGNOSIS — I10 ESSENTIAL HYPERTENSION: ICD-10-CM

## 2019-09-05 DIAGNOSIS — Z00.00 ROUTINE GENERAL MEDICAL EXAMINATION AT A HEALTH CARE FACILITY: Primary | ICD-10-CM

## 2019-09-05 DIAGNOSIS — F17.200 SMOKER: ICD-10-CM

## 2019-09-05 PROCEDURE — 99395 PREV VISIT EST AGE 18-39: CPT | Performed by: NURSE PRACTITIONER

## 2019-09-05 RX ORDER — LEVOTHYROXINE SODIUM 137 UG/1
137 TABLET ORAL DAILY
Qty: 90 TABLET | Refills: 0 | Status: SHIPPED | OUTPATIENT
Start: 2019-09-05 | End: 2019-12-18

## 2019-09-05 RX ORDER — VENLAFAXINE 75 MG/1
37.5 TABLET ORAL DAILY
Qty: 30 TABLET | Refills: 11
Start: 2019-09-05 | End: 2019-09-17

## 2019-09-05 ASSESSMENT — MIFFLIN-ST. JEOR: SCORE: 1666.17

## 2019-09-05 NOTE — PROGRESS NOTES
SUBJECTIVE:   CC: Nickie Garcia is an 39 year old woman who presents for preventive health visit.     Healthy Habits:    Do you get at least three servings of calcium containing foods daily (dairy, green leafy vegetables, etc.)? two    Amount of exercise or daily activities, outside of work: 0 day(s) per week    Problems taking medications regularly Yes often forgets synthroid    Medication side effects: No    Have you had an eye exam in the past two years? yes    Do you see a dentist twice per year? no    Do you have sleep apnea, excessive snoring or daytime drowsiness?no        Today's PHQ-2 Score:   PHQ-2 ( 1999 Pf[employer]) 9/5/2019 5/3/2019   Q1: Little interest or pleasure in doing things 0 0   Q2: Feeling down, depressed or hopeless 0 0   PHQ-2 Score 0 0       Abuse: Current or Past(Physical, Sexual or Emotional)- No  Do you feel safe in your environment? Yes    Social History     Tobacco Use     Smoking status: Current Every Day Smoker     Smokeless tobacco: Never Used   Substance Use Topics     Alcohol use: No     If you drink alcohol do you typically have >3 drinks per day or >7 drinks per week? Yes - AUDIT SCORE:  5  AUDIT - Alcohol Use Disorders Identification Test - Reproduced from the World Health Organization Audit 2001 (Second Edition) 9/5/2019   1.  How often do you have a drink containing alcohol? 4 or more times a week   2.  How many drinks containing alcohol do you have on a typical day when you are drinking? 1 or 2   3.  How often do you have five or more drinks on one occasion? Less than monthly   4.  How often during the last year have you found that you were not able to stop drinking once you had started? Never   5.  How often during the last year have you failed to do what was normally expected of you because of drinking? Never   6.  How often during the last year have you needed a first drink in the morning to get yourself going after a heavy drinking session? Never   7.  How often  during the last year have you had a feeling of guilt or remorse after drinking? Never   8.  How often during the last year have you been unable to remember what happened the night before because of your drinking? Never   9.  Have you or someone else been injured because of your drinking? No   10. Has a relative, friend, doctor or other health care worker been concerned about your drinking or suggested you cut down? No   TOTAL SCORE 5                        Reviewed orders with patient.  Reviewed health maintenance and updated orders accordingly - Yes  BP Readings from Last 3 Encounters:   09/05/19 135/87   07/15/19 140/90   05/03/19 128/82    Wt Readings from Last 3 Encounters:   09/05/19 93.6 kg (206 lb 6.4 oz)   07/15/19 93 kg (205 lb)   05/03/19 92.5 kg (204 lb)                  Patient Active Problem List   Diagnosis     CARDIOVASCULAR SCREENING; LDL GOAL LESS THAN 160     Hashimotos thyroiditis     Vitamin D deficiency     Anxiety disorder     Multiple sclerosis (H)     History of abnormal cervical Pap smear     Smoker     Essential hypertension     Past Surgical History:   Procedure Laterality Date     HC TOOTH EXTRACTION W/FORCEP       PE TUBES       TONSILLECTOMY & ADENOIDECTOMY         Social History     Tobacco Use     Smoking status: Current Every Day Smoker     Smokeless tobacco: Never Used   Substance Use Topics     Alcohol use: No     Family History   Problem Relation Age of Onset     Hypertension Father      Cerebrovascular Disease Father 54        hemorrhagic stroke d/t rupture of brain aneurysm     Aneurysm Father      Neurologic Disorder Mother         ms     No Known Problems Sister      Cancer Maternal Grandmother         brain     Multiple Sclerosis Maternal Grandfather      Heart Disease Paternal Grandmother         chf     Diabetes Paternal Grandfather      Diabetes Other      Cancer Paternal Uncle         cancer in eye and liver          Current Outpatient Medications   Medication Sig  Dispense Refill     amLODIPine (NORVASC) 5 MG tablet Take 1 tablet (5 mg) by mouth daily 30 tablet 6     Glatiramer Acetate 40 MG/ML SOSY        levothyroxine (SYNTHROID) 137 MCG tablet Take 1 tablet (137 mcg) by mouth daily NAME BRAND ONLY 90 tablet 0     nicotine (NICOTROL) 10 MG/ML SOLN inhalation solution Spray 1 spray in nostril every hour as needed for smoking cessation 1 Bottle 3     Probiotic Product (PROBIOTIC DAILY PO)        varenicline (CHANTIX BEHZAD) 0.5 MG X 11 & 1 MG X 42 tablet Take 0.5 mg tab daily for 3 days, THEN 0.5 mg tab twice daily for 4 days, THEN 1 mg twice daily. 53 tablet 0     varenicline (CHANTIX) 1 MG tablet Take 1 tablet (1 mg) by mouth 2 times daily 56 tablet 2     venlafaxine (EFFEXOR) 75 MG tablet Take 1 tablet (75 mg) by mouth daily 30 tablet 11     Allergies   Allergen Reactions     Wellbutrin [Bupropion] Other (See Comments)     Optic neuritis       Mammogram not appropriate for this patient based on age.    Pertinent mammograms are reviewed under the imaging tab.  History of abnormal Pap smear: NO - age 30-65 PAP every 5 years with negative HPV co-testing recommended  PAP / HPV 2012   PAP NIL NIL     Reviewed and updated as needed this visit by clinical staff  Tobacco  Allergies  Meds  Med Hx  Surg Hx  Fam Hx  Soc Hx        Reviewed and updated as needed this visit by Provider        Past Medical History:   Diagnosis Date     Anxiety disorder      Hypothyroid      Ovarian cyst       Past Surgical History:   Procedure Laterality Date     HC TOOTH EXTRACTION W/FORCEP       PE TUBES       TONSILLECTOMY & ADENOIDECTOMY       OB History    Para Term  AB Living   2 2 2 0 0 2   SAB TAB Ectopic Multiple Live Births   0 0 0 0 2      # Outcome Date GA Lbr Ordrigo/2nd Weight Sex Delivery Anes PTL Lv   2 Term    3.941 kg (8 lb 11 oz)     CHACHO   1 Term    3.997 kg (8 lb 13 oz)     CHACHO      Birth Comments: System Generated. Please review and update  "pregnancy details.       ROS:  CONSTITUTIONAL: NEGATIVE for fever, chills, change in weight  INTEGUMENTARU/SKIN: NEGATIVE for worrisome rashes, moles or lesions  EYES: NEGATIVE for vision changes or irritation  ENT: NEGATIVE for ear, mouth and throat problems  RESP: NEGATIVE for significant cough or SOB  BREAST: NEGATIVE for masses, tenderness or discharge  CV: NEGATIVE for chest pain, palpitations or peripheral edema  GI: NEGATIVE for nausea, abdominal pain, heartburn, or change in bowel habits  : NEGATIVE for unusual urinary or vaginal symptoms. Periods are regular.  MUSCULOSKELETAL: NEGATIVE for significant arthralgias or myalgia  NEURO: NEGATIVE for weakness, dizziness or paresthesias  PSYCHIATRIC: NEGATIVE for changes in mood or affect    OBJECTIVE:   /87   Pulse 76   Temp 97.4  F (36.3  C) (Oral)   Ht 1.738 m (5' 8.41\")   Wt 93.6 kg (206 lb 6.4 oz)   SpO2 98%   BMI 31.01 kg/m    EXAM:  GENERAL: healthy, alert and no distress  EYES: Eyes grossly normal to inspection, PERRL and conjunctivae and sclerae normal  HENT: ear canals and TM's normal, nose and mouth without ulcers or lesions  NECK: no adenopathy, no asymmetry, masses, or scars and thyroid normal to palpation  RESP: lungs clear to auscultation - no rales, rhonchi or wheezes  CV: regular rate and rhythm, normal S1 S2, no S3 or S4, no murmur, click or rub, no peripheral edema and peripheral pulses strong  ABDOMEN: soft, nontender, no hepatosplenomegaly, no masses and bowel sounds normal  MS: no gross musculoskeletal defects noted, no edema  SKIN: no suspicious lesions or rashes  NEURO: Normal strength and tone, mentation intact and speech normal  PSYCH: mentation appears normal, affect normal/bright    Diagnostic Test Results:  none     ASSESSMENT/PLAN:   1. Routine general medical examination at a health care facility    2. Essential hypertension  Well controlled on 5 mg amlodipine.  CPM.      3. Generalized anxiety disorder  Well controlled " "on current dose of effexor (37.5 mg)    4. Hypothyroidism due to Hashimoto's thyroiditis  Was off medication due to running out.  To come back in 6 weeks for lab draw after taking daily.  - levothyroxine (SYNTHROID) 137 MCG tablet; Take 1 tablet (137 mcg) by mouth daily NAME BRAND ONLY  Dispense: 90 tablet; Refill: 0  - **TSH with free T4 reflex FUTURE anytime; Future    5. Smoker  Patient to start chantix.    - varenicline (CHANTIX BEHZAD) 0.5 MG X 11 & 1 MG X 42 tablet; Take 0.5 mg tab daily for 3 days, THEN 0.5 mg tab twice daily for 4 days, THEN 1 mg twice daily.  Dispense: 53 tablet; Refill: 0    6. Multiple sclerosis (H)  Managed by neurology.  Stable.      7. Screening for diabetes mellitus  - Hemoglobin A1c; Future    COUNSELING:   Reviewed preventive health counseling, as reflected in patient instructions       Regular exercise       Healthy diet/nutrition       Vision screening    Estimated body mass index is 31.01 kg/m  as calculated from the following:    Height as of this encounter: 1.738 m (5' 8.41\").    Weight as of this encounter: 93.6 kg (206 lb 6.4 oz).    Weight management plan: Discussed healthy diet and exercise guidelines     reports that she has been smoking.  She has never used smokeless tobacco.  Tobacco Cessation Action Plan: Pharmacotherapies : Chantix    Counseling Resources:  ATP IV Guidelines  Pooled Cohorts Equation Calculator  Breast Cancer Risk Calculator  FRAX Risk Assessment  ICSI Preventive Guidelines  Dietary Guidelines for Americans, 2010  USDA's MyPlate  ASA Prophylaxis  Lung CA Screening    JUNO Ku CNP  [facility]  "

## 2019-09-05 NOTE — PATIENT INSTRUCTIONS
Preventive Health Recommendations  Female Ages 26 - 39  Yearly exam:   See your health care provider every year in order to    Review health changes.     Discuss preventive care.      Review your medicines if you your doctor has prescribed any.    Until age 30: Get a Pap test every three years (more often if you have had an abnormal result).    After age 30: Talk to your doctor about whether you should have a Pap test every 3 years or have a Pap test with HPV screening every 5 years.   You do not need a Pap test if your uterus was removed (hysterectomy) and you have not had cancer.  You should be tested each year for STDs (sexually transmitted diseases), if you're at risk.   Talk to your provider about how often to have your cholesterol checked.  If you are at risk for diabetes, you should have a diabetes test (fasting glucose).  Shots: Get a flu shot each year. Get a tetanus shot every 10 years.   Nutrition:     Eat at least 5 servings of fruits and vegetables each day.    Eat whole-grain bread, whole-wheat pasta and brown rice instead of white grains and rice.    Get adequate Calcium and Vitamin D.     Lifestyle    Exercise at least 150 minutes a week (30 minutes a day, 5 days of the week). This will help you control your weight and prevent disease.    Limit alcohol to one drink per day.    No smoking.     Wear sunscreen to prevent skin cancer.    See your dentist every six months for an exam and cleaning.    At [facility], we strive to deliver an exceptional experience to you, every time we see you.  If you receive a survey in the mail, please send us back your thoughts. We really do value your feedback.    Your care team:                            Family Medicine Internal Medicine   MD Artie Holm MD Shantel Branch-Fleming, MD Katya Georgiev PA-C Megan Hill, APRN CNP Nam Ho, MD Pediatrics   ANABELLA Rodriguez, MD Angelia Rust  MD Maxine Julian CNP, MD Deborah Mielke, MD Kim Thein, APRN CNP      Clinic hours: Monday - Thursday 7 am-7 pm; Fridays 7 am-5 pm.   Urgent care: Monday - Friday 11 am-9 pm; Saturday and Sunday 9 am-5 pm.  Pharmacy : Monday -Thursday 8 am-8 pm; Friday 8 am-6 pm; Saturday and Sunday 9 am-5 pm.     Clinic: [phone]   Pharmacy: [phone]

## 2019-09-14 DIAGNOSIS — F41.1 GENERALIZED ANXIETY DISORDER: ICD-10-CM

## 2019-09-14 NOTE — TELEPHONE ENCOUNTER
Pharmacy didn't receive venlafaxine (EFFEXOR) 75 MG tablet sent 9/5/19. Please resend Rx.          Reynold Faarax  [facility]

## 2019-09-17 RX ORDER — VENLAFAXINE 75 MG/1
37.5 TABLET ORAL DAILY
Qty: 30 TABLET | Refills: 11 | Status: SHIPPED | OUTPATIENT
Start: 2019-09-17 | End: 2020-01-09

## 2019-10-17 ENCOUNTER — MEDICAL CORRESPONDENCE (OUTPATIENT)
Dept: HEALTH INFORMATION MANAGEMENT | Facility: CLINIC | Age: 39
End: 2019-10-17

## 2019-10-17 ENCOUNTER — OFFICE VISIT (OUTPATIENT)
Dept: NEUROLOGY | Facility: CLINIC | Age: 39
End: 2019-10-17
Attending: PSYCHIATRY & NEUROLOGY
Payer: COMMERCIAL

## 2019-10-17 VITALS
BODY MASS INDEX: 31.33 KG/M2 | HEART RATE: 103 BPM | HEIGHT: 68 IN | WEIGHT: 206.7 LBS | DIASTOLIC BLOOD PRESSURE: 89 MMHG | SYSTOLIC BLOOD PRESSURE: 131 MMHG

## 2019-10-17 DIAGNOSIS — G35 MULTIPLE SCLEROSIS (H): Primary | ICD-10-CM

## 2019-10-17 RX ORDER — DIAZEPAM 10 MG
10 TABLET ORAL EVERY 6 HOURS PRN
Qty: 1 TABLET | Refills: 0 | Status: SHIPPED | OUTPATIENT
Start: 2019-10-17 | End: 2019-12-16

## 2019-10-17 ASSESSMENT — PAIN SCALES - GENERAL: PAINLEVEL: NO PAIN (0)

## 2019-10-17 ASSESSMENT — MIFFLIN-ST. JEOR: SCORE: 1667.6

## 2019-10-17 NOTE — LETTER
10/17/2019       RE: Nickie Garcia  [address]  [place] [address]     Dear Colleague,    Thank you for referring your patient, Nickie Garcia, to the [facility] MULTIPLE SCLEROSIS at [facility]. Please see a copy of my visit note below.    THE [facility] MULTIPLE SCLEROSIS CLINIC  FOLLOW UP VISIT           PRINCIPAL NEUROLOGIC DIAGNOSIS: Multiple Sclerosis        HISTORY OF ILLNESS:    This is a follow visit for this 39 year old right handed genetic female  With a history of MS RR. Who was last seen on  July 2018.   At that time the patient was recommended to repeat MRI's. Since last visit she said she was unable to continue Copaxone after November 2019 mostly due to insurance issues, she has been off of it since then and has not had any new neurological symptoms or anything that can be construed as an exacerbation.  Her early complaints today are fatigue.  She reports that her son has had a lot of health issues and has taken a lot of her time and attention which is why she has been neglecting herself to a certain degree.  She is willing to restart glatiramer acetate as well as obtaining the MRIs that were recommended last year during her first visit.  She has no other issues or complaints    Current Symptoms:  1. fatigue        Current Outpatient Prescriptions:  Current Outpatient Medications   Medication     amLODIPine (NORVASC) 5 MG tablet     levothyroxine (SYNTHROID) 137 MCG tablet     venlafaxine (EFFEXOR) 75 MG tablet     Glatiramer Acetate 40 MG/ML SOSY     nicotine (NICOTROL) 10 MG/ML SOLN inhalation solution     Probiotic Product (PROBIOTIC DAILY PO)     varenicline (CHANTIX BEHZAD) 0.5 MG X 11 & 1 MG X 42 tablet     varenicline (CHANTIX) 1 MG tablet     No current facility-administered medications for this visit.           ALLERGIES       Allergies   Allergen Reactions     Wellbutrin [Bupropion] Other (See Comments)     Optic  neuritis           REVIEW OF SYSTEMS:    Comprehensive review of systems otherwise was negative, including constitutional, head and neck, cardiovascular, pulmonary, gastrointestinal, endocrine, urologic, reproductive, rheumatic, hematologic, immunologic, dermatologic, and psychiatric.    Nutritional concerns: None  Driving issues: None   Safety concerns regarding living situations and safety at home: None  Risk of falls: None  Pain: None    PHYSICAL EXAM:     Hair, skin, nails, and joints were normal. Neck was supple without Lhermitte's phenomenon.  There was no percussion tenderness over the spine.      The patient was alert and oriented to person, place, and time with normal language, attention and concentration, recent and remote memory, praxis, and intellectual function. Affect was normal. The patient did not appear depressed.     Visual acuity:  OD 20/20  OS 20/20    Correction: with contacts     Visual fields were full to confrontation.   Pupils were 3mm and briskly reactive OU without a relative afferent pupillary defect.  Funduscopic examination was normal right eye palor OS  Extraocular movements: Intact without ELIU  Facial sensation is normal. Normal strength of the muscles of mastication:   Muscles of facial expression were normal  Hearing was normal. Gag reflex and palatal movements were normal. Sternocleidomastoid and trapezius power were normal. Tongue movements were normal. There was no dysarthria.     Motor Examination:   There was no pronator drift.         Motor     Upper        Right Left  Shoulder Abduction 5 5  Elbow Flexion 5 5  Elbow Extension 5 5  Wrist Extension 5 5  Digit Extension 5 5  Digit Flexion 5 5  APB 5 5  Tone 0 0  Lower         Right Left  Hip Flexion 5 5  Knee Extension 5 5  Knee Flexion 5 5  Foot Dorsiflexion 5 5  Foot Plantar Flexion 5 5  EH 5 5  Toe Flexion 5 5  Tone 0 0                   Reflexes:       Reflexes          Right   Left  Biceps 1   1  Triceps 1   1  Brachioradialis 1   1  Patellar  3   2  Achilles unsustained clonus 1 beat   1  Babinski down   down           Coordination:       Right Left  RRM Normal Normal  MARTA Normal Normal  FTN Normal Normal  RRM Normal Normal  HKS Normal Normal           Sensory examination:     Light touch:  Intact in all extremities        Coordination and Gait           Gait Normal     Right Left  Romberg Normal   Heel Normal Normal  Tandem {Normal   Toe Normal Normal                          QUANTITATIVE SCORES:     Visual: 0-Normal  Brainstem: 0-Normal  Pyramidal: 1-Signs only  Cerebellar: 0-Normal  Sensory: 0-Normal  Bladder/Bowel: 0-Normal  Cerebral: 0-Normal  Ambulatory: 0-Unrestricted     EDSS: 1.0- no disability, minimal signs in one FS (one FS grade 1)          REVIEW OF IMAGING STUDIES:    I personally reviewed the following images:    MRI of the brain with and without contrast from January 2018 shows at least 5 lesions typical of MS some of them periventricular no enhancements.  We have no other images available for my review but the brain MRI available is supportive of the diagnosis of MS    ASSESSMENT:    RRMS clinically stable off Copaxone. Fatigue an issue.    PLAN:    Repeat [place]'s B, [place] and [place] [facility]    Re-start Copaxone, we will complete form today    Start Vit D3 5000 international unit(s) a day    Omega 3 FA    Multivitamin    MRA will be obtained since she has a strong FH of aneurysms.      Finally I will follow the patient up in 2 month(s) as long as the patient is doing well. I instructed the patient to call or mychart my office with any concerns or questions.    I spent 35 minutes in this visit, with >50% direct patient time spent counseling about prognosis, treatment options, and coordination of care.     My recommendations will be communicated back to the patient's physician(s) by mail.  Follow-up is expected to be with  terri Franco MD  Chief, Multiple Sclerosis Division  Department of Neurology  [facility] [place]      (Chart documentation was completed in part with Dragon voice-recognition software. Even though reviewed, some grammatical, spelling, and word errors may remain.)

## 2019-10-17 NOTE — PROGRESS NOTES
THE [facility] MULTIPLE SCLEROSIS CLINIC  FOLLOW UP VISIT           PRINCIPAL NEUROLOGIC DIAGNOSIS: Multiple Sclerosis        HISTORY OF ILLNESS:    This is a follow visit for this 39 year old right handed genetic female  With a history of MS RR. Who was last seen on  July 2018.   At that time the patient was recommended to repeat MRI's. Since last visit she said she was unable to continue Copaxone after November 2019 mostly due to insurance issues, she has been off of it since then and has not had any new neurological symptoms or anything that can be construed as an exacerbation.  Her early complaints today are fatigue.  She reports that her son has had a lot of health issues and has taken a lot of her time and attention which is why she has been neglecting herself to a certain degree.  She is willing to restart glatiramer acetate as well as obtaining the MRIs that were recommended last year during her first visit.  She has no other issues or complaints    Current Symptoms:  1. fatigue        Current Outpatient Prescriptions:  Current Outpatient Medications   Medication     amLODIPine (NORVASC) 5 MG tablet     levothyroxine (SYNTHROID) 137 MCG tablet     venlafaxine (EFFEXOR) 75 MG tablet     Glatiramer Acetate 40 MG/ML SOSY     nicotine (NICOTROL) 10 MG/ML SOLN inhalation solution     Probiotic Product (PROBIOTIC DAILY PO)     varenicline (CHANTIX BEHZAD) 0.5 MG X 11 & 1 MG X 42 tablet     varenicline (CHANTIX) 1 MG tablet     No current facility-administered medications for this visit.           ALLERGIES       Allergies   Allergen Reactions     Wellbutrin [Bupropion] Other (See Comments)     Optic neuritis           REVIEW OF SYSTEMS:    Comprehensive review of systems otherwise was negative, including constitutional, head and neck, cardiovascular, pulmonary, gastrointestinal, endocrine, urologic, reproductive, rheumatic, hematologic, immunologic, dermatologic, and psychiatric.    Nutritional  concerns: None  Driving issues: None   Safety concerns regarding living situations and safety at home: None  Risk of falls: None  Pain: None    PHYSICAL EXAM:     Hair, skin, nails, and joints were normal. Neck was supple without Lhermitte's phenomenon.  There was no percussion tenderness over the spine.      The patient was alert and oriented to person, place, and time with normal language, attention and concentration, recent and remote memory, praxis, and intellectual function. Affect was normal. The patient did not appear depressed.     Visual acuity:  OD 20/20  OS 20/20    Correction: with contacts     Visual fields were full to confrontation.   Pupils were 3mm and briskly reactive OU without a relative afferent pupillary defect.  Funduscopic examination was normal right eye palor OS  Extraocular movements: Intact without ELIU  Facial sensation is normal. Normal strength of the muscles of mastication:   Muscles of facial expression were normal  Hearing was normal. Gag reflex and palatal movements were normal. Sternocleidomastoid and trapezius power were normal. Tongue movements were normal. There was no dysarthria.     Motor Examination:   There was no pronator drift.         Motor     Upper        Right Left  Shoulder Abduction 5 5  Elbow Flexion 5 5  Elbow Extension 5 5  Wrist Extension 5 5  Digit Extension 5 5  Digit Flexion 5 5  APB 5 5  Tone 0 0  Lower         Right Left  Hip Flexion 5 5  Knee Extension 5 5  Knee Flexion 5 5  Foot Dorsiflexion 5 5  Foot Plantar Flexion 5 5  EH 5 5  Toe Flexion 5 5  Tone 0 0                   Reflexes:      Reflexes          Right   Left  Biceps 1   1  Triceps 1   1  Brachioradialis 1   1  Patellar  3   2  Achilles unsustained clonus 1 beat   1  Babinski down   down           Coordination:       Right Left  RRM Normal Normal  MARTA Normal Normal  FTN Normal Normal  RRM Normal Normal  HKS Normal Normal           Sensory examination:     Light touch:  Intact in all  extremities        Coordination and Gait           Gait Normal     Right Left  Romberg Normal   Heel Normal Normal  Tandem {Normal   Toe Normal Normal                          QUANTITATIVE SCORES:     Visual: 0-Normal  Brainstem: 0-Normal  Pyramidal: 1-Signs only  Cerebellar: 0-Normal  Sensory: 0-Normal  Bladder/Bowel: 0-Normal  Cerebral: 0-Normal  Ambulatory: 0-Unrestricted     EDSS: 1.0- no disability, minimal signs in one FS (one FS grade 1)          REVIEW OF IMAGING STUDIES:    I personally reviewed the following images:    MRI of the brain with and without contrast from January 2018 shows at least 5 lesions typical of MS some of them periventricular no enhancements.  We have no other images available for my review but the brain MRI available is supportive of the diagnosis of MS    ASSESSMENT:    RRMS clinically stable off Copaxone. Fatigue an issue.    PLAN:    [place]'s [place], [place] and [place] [facility] [place]    Re-start Copaxone, we will complete form today    Start Vit D3 5000 international unit(s) a day    Omega 3 FA    Multivitamin    MRA will be obtained since she has a strong FH of aneurysms.      Finally I will follow the patient up in 2 month(s) as long as the patient is doing well. I instructed the patient to call or mychart my office with any concerns or questions.    I spent 35 minutes in this visit, with >50% direct patient time spent counseling about prognosis, treatment options, and coordination of care.     My recommendations will be communicated back to the patient's physician(s) by mail.  Follow-up is expected to be with me.      Liz Franco MD  Chief, Multiple Sclerosis Division  Department of Neurology  [facility] [place]      (Chart documentation was completed in part with Dragon voice-recognition software. Even though reviewed, some grammatical, spelling, and word errors may remain.)

## 2019-10-17 NOTE — PATIENT INSTRUCTIONS
RRMS clinically stable off DMT for 1 year due to insurance issues    Plan is repeat MRI's B, C and T spine w/wo contrast at [facility] [place]    Re-start Copaxone, we will complete form today    Start Vit D3 5000 international unit(s) a day    Omega 3 FA    Multivitamin

## 2019-10-22 ENCOUNTER — TELEPHONE (OUTPATIENT)
Dept: NEUROLOGY | Facility: CLINIC | Age: 39
End: 2019-10-22

## 2019-10-22 NOTE — TELEPHONE ENCOUNTER
Prior Authorization Approval    Authorization Effective Date: 7/31/2018  Authorization Expiration Date: 7/5/2023  Medication: Copaxone 40MG/ML Syringes (APPROVED)  Approved Dose/Quantity: 12  Reference #:     Insurance Company: [employer] - Phone [phone] Fax [phone]  Expected CoPay: $35     CoPay Card Available:      Foundation Assistance Needed:    Which Pharmacy is filling the prescription (Not needed for infusion/clinic administered): [facility] - [place], [place] - [address]  Pharmacy Notified: Yes  Patient Notified: Yes

## 2019-10-22 NOTE — TELEPHONE ENCOUNTER
Prior Authorization Specialty Medication Request    Medication/Dose: Copaxone 40mg  ICD code (if different than what is on RX):  Relapsing Remitting Multiple Sclerosis, G35  Previously Tried and Failed:  n/a    Important Lab Values: n/a  Rationale: Re-initiation of disease modifying therapy for demyelinating disease, please approve.    Insurance Name: [employer]  Insurance ID: [id number]  Insurance Phone Number: [phone]    Pharmacy Information (if different than what is on RX)  Name:  n/a  Phone:  n/a

## 2019-10-29 ENCOUNTER — ANCILLARY PROCEDURE (OUTPATIENT)
Dept: MRI IMAGING | Facility: CLINIC | Age: 39
End: 2019-10-29
Attending: PSYCHIATRY & NEUROLOGY
Payer: COMMERCIAL

## 2019-10-29 DIAGNOSIS — G35 MULTIPLE SCLEROSIS (H): ICD-10-CM

## 2019-10-29 PROCEDURE — 70544 MR ANGIOGRAPHY HEAD W/O DYE: CPT | Mod: 59 | Performed by: RADIOLOGY

## 2019-10-29 PROCEDURE — 72157 MRI CHEST SPINE W/O & W/DYE: CPT | Performed by: RADIOLOGY

## 2019-10-29 PROCEDURE — A9585 GADOBUTROL INJECTION: HCPCS | Mod: JW | Performed by: PSYCHIATRY & NEUROLOGY

## 2019-10-29 PROCEDURE — 70553 MRI BRAIN STEM W/O & W/DYE: CPT | Mod: 51 | Performed by: RADIOLOGY

## 2019-10-29 PROCEDURE — 72156 MRI NECK SPINE W/O & W/DYE: CPT | Mod: 51 | Performed by: RADIOLOGY

## 2019-10-29 RX ORDER — GADOBUTROL 604.72 MG/ML
10 INJECTION INTRAVENOUS ONCE
Status: COMPLETED | OUTPATIENT
Start: 2019-10-29 | End: 2019-10-29

## 2019-10-29 RX ADMIN — GADOBUTROL 9.5 ML: 604.72 INJECTION INTRAVENOUS at 14:23

## 2019-11-07 ENCOUNTER — HEALTH MAINTENANCE LETTER (OUTPATIENT)
Age: 39
End: 2019-11-07

## 2019-12-14 ENCOUNTER — TRANSFERRED RECORDS (OUTPATIENT)
Dept: HEALTH INFORMATION MANAGEMENT | Facility: CLINIC | Age: 39
End: 2019-12-14

## 2019-12-16 ENCOUNTER — MYC MEDICAL ADVICE (OUTPATIENT)
Dept: NEUROLOGY | Facility: CLINIC | Age: 39
End: 2019-12-16

## 2019-12-16 ENCOUNTER — OFFICE VISIT (OUTPATIENT)
Dept: FAMILY MEDICINE | Facility: CLINIC | Age: 39
End: 2019-12-16
Payer: COMMERCIAL

## 2019-12-16 ENCOUNTER — APPOINTMENT (OUTPATIENT)
Dept: LAB | Facility: CLINIC | Age: 39
End: 2019-12-16
Payer: COMMERCIAL

## 2019-12-16 VITALS
HEART RATE: 74 BPM | SYSTOLIC BLOOD PRESSURE: 124 MMHG | BODY MASS INDEX: 31.37 KG/M2 | DIASTOLIC BLOOD PRESSURE: 79 MMHG | RESPIRATION RATE: 17 BRPM | OXYGEN SATURATION: 98 % | HEIGHT: 68 IN | WEIGHT: 207 LBS | TEMPERATURE: 98.2 F

## 2019-12-16 DIAGNOSIS — I10 ESSENTIAL HYPERTENSION: ICD-10-CM

## 2019-12-16 DIAGNOSIS — Z32.00 PREGNANCY EXAMINATION OR TEST, PREGNANCY UNCONFIRMED: ICD-10-CM

## 2019-12-16 DIAGNOSIS — Z13.1 SCREENING FOR DIABETES MELLITUS: ICD-10-CM

## 2019-12-16 DIAGNOSIS — F17.200 SMOKER: ICD-10-CM

## 2019-12-16 DIAGNOSIS — Z32.01 PREGNANCY TEST POSITIVE: Primary | ICD-10-CM

## 2019-12-16 DIAGNOSIS — E06.3 HYPOTHYROIDISM DUE TO HASHIMOTO'S THYROIDITIS: ICD-10-CM

## 2019-12-16 DIAGNOSIS — G35 MULTIPLE SCLEROSIS (H): ICD-10-CM

## 2019-12-16 LAB
GLUCOSE SERPL-MCNC: 107 MG/DL (ref 70–99)
HBA1C MFR BLD: 4.8 % (ref 0–5.6)
HCG UR QL: POSITIVE
T4 FREE SERPL-MCNC: 0.69 NG/DL (ref 0.76–1.46)
TSH SERPL DL<=0.005 MIU/L-ACNC: 81.9 MU/L (ref 0.4–4)

## 2019-12-16 PROCEDURE — 84443 ASSAY THYROID STIM HORMONE: CPT | Performed by: FAMILY MEDICINE

## 2019-12-16 PROCEDURE — 84439 ASSAY OF FREE THYROXINE: CPT | Performed by: FAMILY MEDICINE

## 2019-12-16 PROCEDURE — 36415 COLL VENOUS BLD VENIPUNCTURE: CPT | Performed by: FAMILY MEDICINE

## 2019-12-16 PROCEDURE — 83036 HEMOGLOBIN GLYCOSYLATED A1C: CPT | Performed by: FAMILY MEDICINE

## 2019-12-16 PROCEDURE — 81025 URINE PREGNANCY TEST: CPT | Performed by: FAMILY MEDICINE

## 2019-12-16 PROCEDURE — 99214 OFFICE O/P EST MOD 30 MIN: CPT | Performed by: FAMILY MEDICINE

## 2019-12-16 PROCEDURE — 82947 ASSAY GLUCOSE BLOOD QUANT: CPT | Performed by: FAMILY MEDICINE

## 2019-12-16 ASSESSMENT — MIFFLIN-ST. JEOR: SCORE: 1668.96

## 2019-12-16 ASSESSMENT — PAIN SCALES - GENERAL: PAINLEVEL: SEVERE PAIN (7)

## 2019-12-16 NOTE — PATIENT INSTRUCTIONS
At [facility], we strive to deliver an exceptional experience to you, every time we see you.  If you receive a survey in the mail, please send us back your thoughts. We really do value your feedback.    Based on your medical history, these are the current health maintenance/preventive care services that you are due for (some may have been done at this visit.)  Health Maintenance Due   Topic Date Due     PNEUMOCOCCAL IMMUNIZATION 19-64 MEDIUM RISK (1 of 1 - PPSV23) 02/01/1999         Suggested websites for health information:  Www.[employer]kids.org : Up to date and easily searchable information on multiple topics.  Www.[place].gov : medication info, interactive tutorials, watch real surgeries online  Www.familydoctor.org : good info from the Academy of Family Physicians  Www.cdc.gov : public health info, travel advisories, epidemics (H1N1)  Www.aap.org : children's health info, normal development, vaccinations  Www.health.[facility].[place].us : [place] dept of health, public health issues in [place], N1N1    Your care team:                            Family Medicine Internal Medicine   MD Artie Holm MD Shantel Branch-Fleming, MD Katya Georgiev PA-C Nam Ho, MD Pediatrics   ANABELLA Rodriguez, BECCA Maher APRN CNP   MD Maxine Holland MD Deborah Mielke, MD Kim Thein, APRN CNP      Clinic hours: Monday - Thursday 7 am-7 pm; Fridays 7 am-5 pm.   Urgent care: Monday - Friday 11 am-9 pm; Saturday and Sunday 9 am-5 pm.  Pharmacy : Monday -Thursday 8 am-8 pm; Friday 8 am-6 pm; Saturday and Sunday 9 am-5 pm.     Clinic: [phone]   Pharmacy: [phone]    Patient Education     Pregnancy    Your exam today shows that you are pregnant.  Pregnancy symptoms  During pregnancy your body s hormones change. This causes physical and emotional changes. This is normal. Knowing what to expect is important for your piece of mind and so you know when to seek  help for a problem. Here are some of the most common symptoms:    Morning sickness or nausea. This can happen any time of the day or night.    Tender, swollen breasts    Need to urinate frequently    Tiredness or fatigue    Dizziness    Indigestion or heartburn    Food cravings or turn-offs    Constipation    Emotional changes. This can range from anxiety to excitement to depression.  General care for a healthy pregnancy  Here are things you can do to help make sure your baby is born healthy:    Rest when you feel tired. This is especially true in the later months of pregnancy.    Drink more fluids. Your body needs more fluids than you may be used to. Drink 8 to10 glasses of juice, milk, or water every day.    Eat well-balanced meals. Eat at regular times to give your body enough protein. You can expect to gain about 30 pounds during the pregnancy. Don t try to diet or lose weight while you are pregnant.    Take a prenatal vitamin every day. This helps you meet the extra nutritional needs of pregnancy.    Don t take any other medicine during your pregnancy unless your healthcare provider tells you to. This includes prescription medicines and those you buy over the counter. Many medicines can harm the growing baby.    If you have nausea or vomiting, don t eat greasy or fried foods. Eat several smaller meals throughout the day rather than 3 large meals.    If you smoke, you must stop. The nicotine you breathe in goes right to the baby.    Stay away from alcohol, even in moderate amounts. Daily drinking will harm your baby and can cause permanent brain damage.    Don t use recreational drugs, especially cocaine, crack, and heroin. These will harm your baby. Also avoid marijuana.    If you were using recreational drugs or prescribed medicine when you found out that you were pregnant, talk with your healthcare provider about possible effects on your growing baby.    If you have medical problems that you need to take  medicine for, talk with your healthcare provider.  Follow-up care  Call your healthcare provider to arrange for prenatal care. Prenatal care is important. You can see your family provider, a pregnancy specialist (obstetrician), or a primary care clinic.  When to seek medical advice  Call your healthcare provider right away if any of these occur:    Vaginal bleeding    Pain in your belly (abdomen) or back that is moderate or severe    Lots of vomiting, or you can t keep any fluids down for 6 hours    Burning feeling when you urinate    Headache, dizziness, or rapid weight gain    Fever    Vision changes or blurred vision  Date Last Reviewed: 10/1/2016    2884-2461 "[employer]. [address] [place] [address], [place] [address]. All rights reserved. This information is not intended as a substitute for professional medical care. Always follow your healthcare professional's instructions.

## 2019-12-16 NOTE — TELEPHONE ENCOUNTER
I received the following response from Dr. Franco:    Continue Copaxone, appointment next available.    [employer] message sent to patient with this information; I will have our medical assistant, Ananbel, call the patient to schedule an appointment with Dr. Franco.    Madeleine Ferrara, MS RN Care Coordinator

## 2019-12-16 NOTE — TELEPHONE ENCOUNTER
Patient sent [employer] message stating that she is pregnant; She currently does not have an appointment scheduled with Dr. Franco; Dr. Franco, when do you want to see the patient, and would you like her to continue or stop the Copaxone? Thank you.    Madeleine Ferrara, MS RN Care Coordinator

## 2019-12-16 NOTE — PROGRESS NOTES
Subjective     Nickie Garcia is a 39 year old female who presents to clinic today for the following health issues:    HPI   Pregnancy Test: LMP 11/14/19  Three positive at home pregnancy tests  Symptoms: breast tenderness and nausea  Patient wondering about what medications to take while pregnant. Quit smoking, started taking multivitamin with iron.   Has been on prednisone taper for her back and has 3 days left. Back is feeling better.     Hypertension Follow-up      Do you check your blood pressure regularly outside of the clinic? Yes     Are you following a low salt diet? Yes    Are your blood pressures ever more than 140 on the top number (systolic) OR more   than 90 on the bottom number (diastolic), for example 140/90? No    Hypothyroidism Follow-up      Since last visit, patient describes the following symptoms: Weight stable, no hair loss, no skin changes, no constipation, no loose stools    Multiple Sclerosis in stable phase. Stopped taking vitamin D.     Patient Active Problem List   Diagnosis     CARDIOVASCULAR SCREENING; LDL GOAL LESS THAN 160     Hashimotos thyroiditis     Vitamin D deficiency     Anxiety disorder     Multiple sclerosis (H)     History of abnormal cervical Pap smear     Smoker     Essential hypertension     Past Surgical History:   Procedure Laterality Date     HC TOOTH EXTRACTION W/FORCEP       PE TUBES       TONSILLECTOMY & ADENOIDECTOMY         Social History     Tobacco Use     Smoking status: Former Smoker     Last attempt to quit: 12/15/2019     Smokeless tobacco: Never Used   Substance Use Topics     Alcohol use: No     Family History   Problem Relation Age of Onset     Hypertension Father      Cerebrovascular Disease Father 54        hemorrhagic stroke d/t rupture of brain aneurysm     Aneurysm Father      Neurologic Disorder Mother         ms     No Known Problems Sister      Cancer Maternal Grandmother         brain     Multiple Sclerosis Maternal Grandfather      Heart  "Disease Paternal Grandmother         chf     Diabetes Paternal Grandfather      Diabetes Other      Cancer Paternal Uncle         cancer in eye and liver          Current Outpatient Medications   Medication Sig Dispense Refill     levothyroxine (SYNTHROID) 137 MCG tablet Take 1 tablet (137 mcg) by mouth daily NAME BRAND ONLY 90 tablet 0     Glatiramer Acetate 40 MG/ML SOSY        Probiotic Product (PROBIOTIC DAILY PO)        venlafaxine (EFFEXOR) 75 MG tablet Take 0.5 tablets (37.5 mg) by mouth daily (Patient not taking: Reported on 12/16/2019) 30 tablet 11     Allergies   Allergen Reactions     Wellbutrin [Bupropion] Other (See Comments)     Optic neuritis     Recent Labs   Lab Test 05/03/19  0731 02/25/19  1542  09/14/12  0912  05/30/12  1221 02/20/12  1707 12/30/11   *  --   --   --   --  142*  --   --    HDL 49*  --   --   --   --  52  --   --    TRIG 201*  --   --   --   --  113  --   --    ALT  --   --   --   --   --  13 26 62*   CR  --   --   --  0.76  --   --   --  0.85   GFRESTIMATED  --   --   --  88  --   --   --   --    GFRESTBLACK  --   --   --  >90  --   --   --   --    POTASSIUM  --   --   --  4.1  --   --   --  4.2   TSH 4.27* 41.45*  41.45*   < >  --    < > 5.74*  --   --     < > = values in this interval not displayed.      BP Readings from Last 3 Encounters:   12/16/19 124/79   10/17/19 131/89   09/05/19 135/87    Wt Readings from Last 3 Encounters:   12/16/19 93.9 kg (207 lb)   10/17/19 93.8 kg (206 lb 11.2 oz)   09/05/19 93.6 kg (206 lb 6.4 oz)                      Reviewed and updated as needed this visit by Provider         Review of Systems   ROS COMP: Constitutional, HEENT, cardiovascular, pulmonary, gi and gu systems are negative, except as otherwise noted.      Objective    /79 (BP Location: Left arm, Patient Position: Chair, Cuff Size: Adult Large)   Pulse 74   Temp 98.2  F (36.8  C) (Oral)   Resp 17   Ht 1.738 m (5' 8.41\")   Wt 93.9 kg (207 lb)   LMP 11/14/2019 (Exact " Date)   SpO2 98%   Breastfeeding No   BMI 31.10 kg/m    Body mass index is 31.1 kg/m .  Physical Exam   GENERAL: healthy, alert, well nourished, well hydrated, no distress, obese  HENT: ear canals- normal; TMs- normal; Nose- normal; Mouth- no ulcers, no lesions, throat is clear with no erythema or exudate.   NECK: no tenderness, no adenopathy, no asymmetry, no masses, no stiffness; thyroid- normal to palpation  RESP: lungs clear to auscultation - no rales, no rhonchi, no wheezes  CV: regular rates and rhythm, normal S1 S2, no S3 or S4 and no murmur, no click or rub -  ABDOMEN: soft, no tenderness, no  hepatosplenomegaly, no masses, normal bowel sounds  MS: extremities- no gross deformities noted, no edema  SKIN: no suspicious lesions, no rashes  NEURO: strength and tone- normal, sensory exam- grossly normal, mentation- intact, speech- normal, reflexes- symmetric  BACK: no CVA tenderness, no paralumbar tenderness  PSYCH: Alert and oriented times 3; speech- coherent , normal rate and volume; able to articulate logical thoughts, able to abstract reason, no tangential thoughts, no hallucinations or delusions, affect- normal     Diagnostic Test Results:  Labs reviewed in Epic  Results for orders placed or performed in visit on 12/16/19 (from the past 24 hour(s))   HCG Qual, Urine ([id number])   Result Value Ref Range    HCG Qual Urine Positive (A) NEG^Negative           Assessment & Plan       ICD-10-CM    1. Pregnancy examination or test, confirmed.  Z32.00 HCG Qual, Urine ([id number]), positive early pregnancy. Recommend stop unnecessary medication and follow up with obstetrics and gynecology.      OB/GYN REFERRAL- recommend prenatal visit in 2-3 weeks due to advanced maternal age and high risk factors.    2. Screening for diabetes mellitus Z13.1  Glucose      Hemoglobin A1c   3. Hypothyroidism due to Hashimoto's thyroiditis E03.8 OB/GYN REFERRAL    E06.3  **TSH with free T4 reflex   4. Essential hypertension I10 Well  controlled and not taking medication at this time.    5. Smoker F17.200 Smoking cessation counseling done    6. Multiple sclerosis (H) G35 Follow up with neurology as scheduled. Let them know about the pregnancy           CONSULTATION/REFERRAL to obstetrics and gynecology for prenatal care.   FUTURE LABS:       - Schedule a non-fasting blood draw 2 weeks  FUTURE APPOINTMENTS:       - Follow-up visit in 2 weeks with obstetrics and gynecology   See Patient Instructions    No follow-ups on file.    Doreen Peraza MD  [facility]

## 2019-12-17 NOTE — TELEPHONE ENCOUNTER
Called patient to schedule- no answer- VM full. Sent [employer] message to call scheduling to get an appointment with Dr Salvador Brown MA

## 2019-12-18 ENCOUNTER — TELEPHONE (OUTPATIENT)
Dept: FAMILY MEDICINE | Facility: CLINIC | Age: 39
End: 2019-12-18

## 2019-12-18 DIAGNOSIS — E06.3 HYPOTHYROIDISM DUE TO HASHIMOTO'S THYROIDITIS: ICD-10-CM

## 2019-12-18 RX ORDER — LEVOTHYROXINE SODIUM 150 UG/1
137 TABLET ORAL DAILY
Qty: 90 TABLET | Refills: 1 | Status: SHIPPED | OUTPATIENT
Start: 2019-12-18 | End: 2020-01-10

## 2019-12-18 NOTE — TELEPHONE ENCOUNTER
Reason for Call:  Other Thyroid     Detailed comments:  Pt received an  email through her [employer]hart that her thryoid level was 80 and was surprised that her Provider did not bring this to her attention for she is concerned that  80 is out of the normal thyroid level and also that Pt is currently pregnant. She would like a call back as soon as possible to discuss.     Phone Number Patient can be reached at: Home number on file [phone] (home)    Best Time: anytime    Can we leave a detailed message on this number? YES    Call taken on 12/18/2019 at 3:54 PM by Dheeraj Peralta

## 2019-12-18 NOTE — TELEPHONE ENCOUNTER
Routing to provider to review and advise, see patient message and concerns below.  Was in office on 12/16 and had labs done, including pregnancy confirmation.  TSH level came back quite elevated, at 81.90.  Patient concerned of this result and now being pregnant.  Lab results have been released to MyC page, but do not note a provider interpretation, at this time.      Cherelle Parkinson RN  [facility]

## 2019-12-19 NOTE — RESULT ENCOUNTER NOTE
Dear Nickie    Your test results are attached.    The TSH is very elevated and you need to go up on your thyroid dose. Make sure to take it 30 minutes before eating in the morning also. We should recheck the level in 1 month. The blood sugar and test for diabetes are in normal range but should also be rechecked. I sent the new prescription to your pharmacy.     Please contact me by [employer] if you have any questions about your labs or management. You may also call my office number [phone] for any questions.     Doreen Peraza MD

## 2019-12-19 NOTE — TELEPHONE ENCOUNTER
Lab report reviewed and dose of thyroid increased. I will send the results to Dr. Ni.   Doreen Peraza MD

## 2020-01-09 ENCOUNTER — ANCILLARY PROCEDURE (OUTPATIENT)
Dept: ULTRASOUND IMAGING | Facility: CLINIC | Age: 40
End: 2020-01-09
Attending: OBSTETRICS & GYNECOLOGY
Payer: COMMERCIAL

## 2020-01-09 ENCOUNTER — PRENATAL OFFICE VISIT (OUTPATIENT)
Dept: OBGYN | Facility: CLINIC | Age: 40
End: 2020-01-09
Attending: FAMILY MEDICINE
Payer: COMMERCIAL

## 2020-01-09 VITALS
DIASTOLIC BLOOD PRESSURE: 84 MMHG | HEART RATE: 101 BPM | SYSTOLIC BLOOD PRESSURE: 132 MMHG | TEMPERATURE: 97.7 F | BODY MASS INDEX: 32 KG/M2 | WEIGHT: 213 LBS

## 2020-01-09 DIAGNOSIS — Z34.01 ENCOUNTER FOR SUPERVISION OF NORMAL FIRST PREGNANCY IN FIRST TRIMESTER: Primary | ICD-10-CM

## 2020-01-09 DIAGNOSIS — E06.3 HYPOTHYROIDISM DUE TO HASHIMOTO'S THYROIDITIS: ICD-10-CM

## 2020-01-09 DIAGNOSIS — Z34.01 ENCOUNTER FOR SUPERVISION OF NORMAL FIRST PREGNANCY IN FIRST TRIMESTER: ICD-10-CM

## 2020-01-09 DIAGNOSIS — O09.529 ANTEPARTUM MULTIGRAVIDA OF ADVANCED MATERNAL AGE: ICD-10-CM

## 2020-01-09 DIAGNOSIS — E55.9 VITAMIN D DEFICIENCY: ICD-10-CM

## 2020-01-09 DIAGNOSIS — Z34.80 SUPERVISION OF OTHER NORMAL PREGNANCY, ANTEPARTUM: ICD-10-CM

## 2020-01-09 PROBLEM — F17.200 SMOKER: Status: RESOLVED | Noted: 2018-08-30 | Resolved: 2020-01-09

## 2020-01-09 LAB
ABO + RH BLD: NORMAL
ABO + RH BLD: NORMAL
ANION GAP SERPL CALCULATED.3IONS-SCNC: 7 MMOL/L (ref 3–14)
BLD GP AB SCN SERPL QL: NORMAL
BLOOD BANK CMNT PATIENT-IMP: NORMAL
BUN SERPL-MCNC: 9 MG/DL (ref 7–30)
CALCIUM SERPL-MCNC: 9.5 MG/DL (ref 8.5–10.1)
CHLORIDE SERPL-SCNC: 106 MMOL/L (ref 94–109)
CO2 SERPL-SCNC: 25 MMOL/L (ref 20–32)
CREAT SERPL-MCNC: 0.64 MG/DL (ref 0.52–1.04)
CREAT UR-MCNC: 20 MG/DL
ERYTHROCYTE [DISTWIDTH] IN BLOOD BY AUTOMATED COUNT: 11.6 % (ref 10–15)
GFR SERPL CREATININE-BSD FRML MDRD: >90 ML/MIN/{1.73_M2}
GLUCOSE SERPL-MCNC: 88 MG/DL (ref 70–99)
HCT VFR BLD AUTO: 38.6 % (ref 35–47)
HGB BLD-MCNC: 13 G/DL (ref 11.7–15.7)
MCH RBC QN AUTO: 33 PG (ref 26.5–33)
MCHC RBC AUTO-ENTMCNC: 33.7 G/DL (ref 31.5–36.5)
MCV RBC AUTO: 98 FL (ref 78–100)
PLATELET # BLD AUTO: 230 10E9/L (ref 150–450)
POTASSIUM SERPL-SCNC: 3.7 MMOL/L (ref 3.4–5.3)
PROT UR-MCNC: <0.05 G/L
PROT/CREAT 24H UR: NORMAL G/G CR (ref 0–0.2)
RBC # BLD AUTO: 3.94 10E12/L (ref 3.8–5.2)
SODIUM SERPL-SCNC: 138 MMOL/L (ref 133–144)
SPECIMEN EXP DATE BLD: NORMAL
T4 FREE SERPL-MCNC: 0.89 NG/DL (ref 0.76–1.46)
TSH SERPL DL<=0.005 MIU/L-ACNC: 8.14 MU/L (ref 0.4–4)
WBC # BLD AUTO: 10 10E9/L (ref 4–11)

## 2020-01-09 PROCEDURE — 84443 ASSAY THYROID STIM HORMONE: CPT | Performed by: OBSTETRICS & GYNECOLOGY

## 2020-01-09 PROCEDURE — 86762 RUBELLA ANTIBODY: CPT | Performed by: OBSTETRICS & GYNECOLOGY

## 2020-01-09 PROCEDURE — 86850 RBC ANTIBODY SCREEN: CPT | Performed by: OBSTETRICS & GYNECOLOGY

## 2020-01-09 PROCEDURE — 80048 BASIC METABOLIC PNL TOTAL CA: CPT | Performed by: OBSTETRICS & GYNECOLOGY

## 2020-01-09 PROCEDURE — 82306 VITAMIN D 25 HYDROXY: CPT | Performed by: OBSTETRICS & GYNECOLOGY

## 2020-01-09 PROCEDURE — 76801 OB US < 14 WKS SINGLE FETUS: CPT | Performed by: RADIOLOGY

## 2020-01-09 PROCEDURE — 87340 HEPATITIS B SURFACE AG IA: CPT | Performed by: OBSTETRICS & GYNECOLOGY

## 2020-01-09 PROCEDURE — 85027 COMPLETE CBC AUTOMATED: CPT | Performed by: OBSTETRICS & GYNECOLOGY

## 2020-01-09 PROCEDURE — 87389 HIV-1 AG W/HIV-1&-2 AB AG IA: CPT | Performed by: OBSTETRICS & GYNECOLOGY

## 2020-01-09 PROCEDURE — 87086 URINE CULTURE/COLONY COUNT: CPT | Performed by: OBSTETRICS & GYNECOLOGY

## 2020-01-09 PROCEDURE — 84439 ASSAY OF FREE THYROXINE: CPT | Performed by: OBSTETRICS & GYNECOLOGY

## 2020-01-09 PROCEDURE — 86901 BLOOD TYPING SEROLOGIC RH(D): CPT | Performed by: OBSTETRICS & GYNECOLOGY

## 2020-01-09 PROCEDURE — 36415 COLL VENOUS BLD VENIPUNCTURE: CPT | Performed by: OBSTETRICS & GYNECOLOGY

## 2020-01-09 PROCEDURE — 84156 ASSAY OF PROTEIN URINE: CPT | Performed by: OBSTETRICS & GYNECOLOGY

## 2020-01-09 PROCEDURE — 99207 ZZC FIRST OB VISIT: CPT | Performed by: OBSTETRICS & GYNECOLOGY

## 2020-01-09 PROCEDURE — 86900 BLOOD TYPING SEROLOGIC ABO: CPT | Performed by: OBSTETRICS & GYNECOLOGY

## 2020-01-09 PROCEDURE — 76817 TRANSVAGINAL US OBSTETRIC: CPT | Performed by: RADIOLOGY

## 2020-01-09 PROCEDURE — 86780 TREPONEMA PALLIDUM: CPT | Performed by: OBSTETRICS & GYNECOLOGY

## 2020-01-10 PROBLEM — E66.09 CLASS 1 OBESITY DUE TO EXCESS CALORIES WITH SERIOUS COMORBIDITY AND BODY MASS INDEX (BMI) OF 30.0 TO 30.9 IN ADULT: Status: ACTIVE | Noted: 2020-01-10

## 2020-01-10 PROBLEM — E66.811 CLASS 1 OBESITY DUE TO EXCESS CALORIES WITH SERIOUS COMORBIDITY AND BODY MASS INDEX (BMI) OF 30.0 TO 30.9 IN ADULT: Status: ACTIVE | Noted: 2020-01-10

## 2020-01-10 PROBLEM — Z34.80 SUPERVISION OF OTHER NORMAL PREGNANCY, ANTEPARTUM: Status: ACTIVE | Noted: 2020-01-10

## 2020-01-10 PROBLEM — M51.360 DISCOGENIC LOW BACK PAIN: Status: ACTIVE | Noted: 2020-01-10

## 2020-01-10 PROBLEM — O09.529 ANTEPARTUM MULTIGRAVIDA OF ADVANCED MATERNAL AGE: Status: ACTIVE | Noted: 2020-01-10

## 2020-01-10 PROBLEM — N20.0 KIDNEY STONE: Status: ACTIVE | Noted: 2019-01-01

## 2020-01-10 LAB
BACTERIA SPEC CULT: NO GROWTH
DEPRECATED CALCIDIOL+CALCIFEROL SERPL-MC: 16 UG/L (ref 20–75)
HBV SURFACE AG SERPL QL IA: NONREACTIVE
HIV 1+2 AB+HIV1 P24 AG SERPL QL IA: NONREACTIVE
RUBV IGG SERPL IA-ACNC: 44 IU/ML
SPECIMEN SOURCE: NORMAL
T PALLIDUM AB SER QL: NONREACTIVE

## 2020-01-10 RX ORDER — LEVOTHYROXINE SODIUM 137 UG/1
137 TABLET ORAL DAILY
Start: 2020-01-10 | End: 2020-01-17

## 2020-01-10 NOTE — PROGRESS NOTES
Nickie Garcia is a 39 year old year old G 3 P 2 who presents for an initial obstetrical visit.  Referred by Dr. Peraza.    Currently experiencing normal pregnancy symptoms without particular problems including pain, bleeding, marked vomiting or weight loss except: discogenic low back pain and is followed by [facility]. Recent prednisone use.   This was not a planned pregnancy but accepting. Plans to parent. NFP used. LMP certain. No history of genetic disorders in families. Her mother also has MS.   Here today with .  Additional concerns: MS with initial optic neuritis and stable lesions by MRI- followed by Neurology and off all meds now, anxiety and trying to remain off Effexor now, recently quit smoking, hypothyroidism and was not taking levothyroxine when recent TSH checked (high)- now taking her previous 137 mcg/d dose, AMA, Vit D def, kidney stones, borderline hypertension, obesity.     ROS:     Systems reviewed include constitutional; breast;                 cardiac; respiratory; gastrointestinal; genitourinary;                                musculoskeletal; integumentary; psychological;                                hematologic/lymphatic and endocrine.                  These systems were negative for significant symptoms except                 for the following: none and see above HPI.    Past medical, obstetrical, surgical, family and social history reviewed and as noted or updated in chart.     Allergies, meds and supplements are as noted or updated in chart.                    Episode OB dating, demographic and history forms completed or reviewed.    EXAM:  VS as noted. BMI- Body mass index is 32 kg/m .    Relatively recent normal general exam- not repeated now.       Nickie was seen today for prenatal care.    Diagnoses and all orders for this visit:    Encounter for supervision of normal first pregnancy in first trimester  -     T4 free  -     TSH  -     Vitamin D Deficiency  -      Basic metabolic panel  -     ABO/Rh type and screen  -     CBC with platelets  -     Hepatitis B surface antigen  -     Rubella Antibody IgG Quantitative  -     Urine Culture Aerobic Bacterial  -     HIV Antigen Antibody Combo  -     Treponema Abs w Reflex to RPR and Titer  -     Cancel: US OB < 14 Weeks Single; Future  -     Protein  random urine with Creat Ratio  -     Creatinine urine calculation only    Supervision of other normal pregnancy, antepartum    Antepartum multigravida of advanced maternal age    Discogenic low back pain    Kidney stone        PLAN: Total encounter time (physician together with patient) = 30min. Direct counseling, education and care coordination time (physician together with patient) = 20min.This counseling included the following: Advice appropriate to gestational age reviewed including pertinent Checklist items. Discussed condition, tests and general care plan. Symptoms, problems and concerns reviewed. Recommended weight gain discussed. Problem list initiated.   Will discuss low dose aspirin for reduction of preeclampsia risk in patients with previous preeclampsia, chronic HTN, obesity, chronic renal disease, autoimmune disorders, diabetes, or twins.   Discussed screening for overt diabetes in high risk patients using Hgb A1c. This was normal.   Check ultrasound now. Pap due postpartum. Orders as noted. RTC in 2-3 weeks. Discussed special screening tests briefly and may be interested in first trimester screening and cell free DNA test- plans to check insurance coverage.     Dipesh Ni MD

## 2020-01-17 ENCOUNTER — TELEPHONE (OUTPATIENT)
Dept: FAMILY MEDICINE | Facility: CLINIC | Age: 40
End: 2020-01-17

## 2020-01-17 DIAGNOSIS — E06.3 HYPOTHYROIDISM DUE TO HASHIMOTO'S THYROIDITIS: ICD-10-CM

## 2020-01-17 RX ORDER — LEVOTHYROXINE SODIUM 137 UG/1
137 TABLET ORAL DAILY
Qty: 90 TABLET | Refills: 0 | Status: SHIPPED | OUTPATIENT
Start: 2020-01-17 | End: 2020-05-04 | Stop reason: DRUGHIGH

## 2020-01-17 NOTE — TELEPHONE ENCOUNTER
Pharmacy is requesting refill of levothyroxine (SYNTHROID/LEVOTHROID) 137 MCG tablet from [facility].    This med looks to be discontinue by Dr Ni.      Please review and direct to appropriate pool.    Requested Prescriptions   Pending Prescriptions Disp Refills     levothyroxine (SYNTHROID/LEVOTHROID) 137 MCG tablet  =      Last Written Prescription Date:  12/18/19  Last Fill Quantity: 90,   # refills: 1  Last Office Visit: 01/09/19-[facility]  Future Office visit:       Routing refill request to provider for review/approval because:  Drug not active on patient's medication list  Medication is reported/historical       Sig: Take 1 tablet (137 mcg) by mouth daily NAME BRAND ONLY       There is no refill protocol information for this order

## 2020-01-17 NOTE — TELEPHONE ENCOUNTER
Provider to address since pharmacy is looking for new Rx from different provider.    Tracie Tai RN, [facility]

## 2020-01-21 ENCOUNTER — OFFICE VISIT (OUTPATIENT)
Dept: FAMILY MEDICINE | Facility: CLINIC | Age: 40
End: 2020-01-21
Payer: COMMERCIAL

## 2020-01-21 VITALS
TEMPERATURE: 98.7 F | RESPIRATION RATE: 20 BRPM | SYSTOLIC BLOOD PRESSURE: 122 MMHG | OXYGEN SATURATION: 99 % | WEIGHT: 208.9 LBS | HEIGHT: 69 IN | HEART RATE: 104 BPM | BODY MASS INDEX: 30.94 KG/M2 | DIASTOLIC BLOOD PRESSURE: 78 MMHG

## 2020-01-21 DIAGNOSIS — G35 MULTIPLE SCLEROSIS (H): ICD-10-CM

## 2020-01-21 DIAGNOSIS — J01.00 ACUTE NON-RECURRENT MAXILLARY SINUSITIS: Primary | ICD-10-CM

## 2020-01-21 DIAGNOSIS — O09.529 ANTEPARTUM MULTIGRAVIDA OF ADVANCED MATERNAL AGE: ICD-10-CM

## 2020-01-21 PROCEDURE — 99214 OFFICE O/P EST MOD 30 MIN: CPT | Performed by: FAMILY MEDICINE

## 2020-01-21 ASSESSMENT — MIFFLIN-ST. JEOR: SCORE: 1679

## 2020-01-21 ASSESSMENT — PAIN SCALES - GENERAL: PAINLEVEL: MILD PAIN (2)

## 2020-01-21 NOTE — PATIENT INSTRUCTIONS
Take Augmentin as prescribed. You can try OTC Mucinex WITHOUT Sudafed- only safe to use plain Mucinex. If symptoms worsen or persist, contact me.     You can try a humidifier if needed.

## 2020-01-21 NOTE — PROGRESS NOTES
"Subjective     Nickie Gracia is a 39 year old female who presents to clinic today for the following health issues:    HPI   Acute Illness   Acute illness concerns: sinus infection  Onset: 1/16/20    Fever: no     Chills/Sweats: no     Headache (location?): YES    Sinus Pressure:YES    Conjunctivitis:  YES- watery right eye    Ear Pain: no    Rhinorrhea: YES    Congestion: YES    Sore Throat: no      Cough: YES    Wheeze: no     Decreased Appetite: no     Nausea: YES    Vomiting: no     Diarrhea:  no     Dysuria/Freq.: no     Fatigue/Achiness: YES- fatigue    Sick/Strep Exposure: no      Therapies Tried and outcome: tylenol    The patient notes that she started with a cold last Thursday. She also has cough, drainage and a \"stuffed\" head. She states that she has a history of sinus infections. She notes taking Tylenol. She declines having a fever.     MS  The patient states that she is not taking her MS medication, as she is pregnant and only had one flare.     Pregnancy  Under the care of Dr. Ni. She is 9 1/2 weeks per her report. Reports all is going well.     Patient Active Problem List   Diagnosis     CARDIOVASCULAR SCREENING; LDL GOAL LESS THAN 160     Hashimotos thyroiditis     Vitamin D deficiency     Anxiety disorder     Multiple sclerosis (H)     History of abnormal cervical Pap smear     Essential hypertension     Supervision of other normal pregnancy, antepartum     Antepartum multigravida of advanced maternal age     Discogenic low back pain     Kidney stone     Class 1 obesity due to excess calories with serious comorbidity and body mass index (BMI) of 30.0 to 30.9 in adult     Past Surgical History:   Procedure Laterality Date     HC COLP CERVIX/UPPER VAGINA  2017     HC DILATION/CURETTAGE DIAG/THER NON OB  2017    benign polyp     HC INSERTION INTRAUTERINE DEVICE  2012     HC REMOVE INTRAUTERINE DEVICE  2012     HC TOOTH EXTRACTION W/FORCEP       PE TUBES       TONSILLECTOMY & ADENOIDECTOMY      "    Social History     Tobacco Use     Smoking status: Former Smoker     Types: Cigarettes     Last attempt to quit: 12/15/2019     Years since quittin.1     Smokeless tobacco: Never Used   Substance Use Topics     Alcohol use: No     Family History   Problem Relation Age of Onset     Hypertension Father      Cerebrovascular Disease Father 54        hemorrhagic stroke d/t rupture of brain aneurysm     Aneurysm Father      Neurologic Disorder Mother         ms     No Known Problems Sister      Cancer Maternal Grandmother         brain     Multiple Sclerosis Maternal Grandfather      Heart Disease Paternal Grandmother         chf     Diabetes Paternal Grandfather      Diabetes Other      Cancer Paternal Uncle         cancer in eye and liver          Current Outpatient Medications   Medication Sig Dispense Refill     levothyroxine (SYNTHROID/LEVOTHROID) 137 MCG tablet Take 1 tablet (137 mcg) by mouth daily NAME BRAND ONLY 90 tablet 0     vitamin D3 (CHOLECALCIFEROL) 1.25 MG (83678 UT) capsule Take 1 capsule (50,000 Units) by mouth every 7 days 8 capsule 0     Allergies   Allergen Reactions     Wellbutrin [Bupropion] Other (See Comments)     Optic neuritis     Recent Labs   Lab Test 20  1600 19  1315 19  0731  12  0912  12  1221 12  1707 11   A1C  --  4.8  --   --   --   --   --   --   --    LDL  --   --  117*  --   --   --  142*  --   --    HDL  --   --  49*  --   --   --  52  --   --    TRIG  --   --  201*  --   --   --  113  --   --    ALT  --   --   --   --   --   --  13 26 62*   CR 0.64  --   --   --  0.76  --   --   --  0.85   GFRESTIMATED >90  --   --   --  88  --   --   --   --    GFRESTBLACK >90  --   --   --  >90  --   --   --   --    POTASSIUM 3.7  --   --   --  4.1  --   --   --  4.2   TSH 8.14* 81.90* 4.27*   < >  --    < > 5.74*  --   --     < > = values in this interval not displayed.      BP Readings from Last 3 Encounters:   20 122/78   20 132/84  "  12/16/19 124/79    Wt Readings from Last 3 Encounters:   01/21/20 94.8 kg (208 lb 14.4 oz)   01/09/20 96.6 kg (213 lb)   12/16/19 93.9 kg (207 lb)        Reviewed and updated as needed this visit by Provider  Tobacco  Allergies  Meds  Problems  Med Hx  Surg Hx  Fam Hx         Review of Systems   ROS COMP: CONSTITUTIONAL: NEGATIVE for fever, chills, change in weight  ENT/MOUTH: NEGATIVE for ear, mouth and throat problems; POSITIVE for drainage and congestion  RESP: NEGATIVE for SOB; POSITIVE for cough  CV: NEGATIVE for chest pain, palpitations or peripheral edema    This document serves as a record of the services and decisions personally performed and made by Dolores Glasgow MD. It was created on her behalf by Aruna Maxwell, a trained medical scribe. The creation of this document is based the provider's statements to the medical scribe.    Aruna Maxwell January 21, 2020 10:35 AM      Objective    /78   Pulse 104   Temp 98.7  F (37.1  C) (Temporal)   Resp 20   Ht 1.74 m (5' 8.5\")   Wt 94.8 kg (208 lb 14.4 oz)   LMP 11/14/2019 (Exact Date)   SpO2 99%   BMI 31.30 kg/m    Body mass index is 31.3 kg/m .  Physical Exam   GENERAL: healthy, alert and no distress  HENT: ear canals and TM's normal, nose and mouth without ulcers or lesions, maxillary sinus tenderness bilaterally, mild posterior oropharynx erythema, some thick postnasal drainage   NECK: no adenopathy, no asymmetry, masses, or scars and thyroid normal to palpation  RESP: lungs clear to auscultation - no rales, rhonchi or wheezes  CV: regular rate and rhythm, normal S1 S2, no S3 or S4, no murmur, click or rub, no peripheral edema and peripheral pulses strong  ABDOMEN: soft, nontender, no hepatosplenomegaly, no masses and bowel sounds normal    Diagnostic Test Results:  No results found for this or any previous visit (from the past 24 hour(s)).        Assessment & Plan     1. Acute non-recurrent maxillary sinusitis  Patient reports " symptoms started Thursday with a cold- now has a cough, drainage and head congestion.   She reports having a history of sinus infections.   Patient takes Tylenol for relief- declines fever.   See exam findings noted above.   Augmentin has been prescribed.   Patient can try OTC plain Mucinex if needed.  Can try Humidifier.  If symptoms worsen or persist, patient will contact me.    - amoxicillin-clavulanate (AUGMENTIN) 875-125 MG tablet; Take 1 tablet by mouth 2 times daily for 10 days  Dispense: 20 tablet; Refill: 0    2. Multiple sclerosis (H)  Patient has history of- only one known flare up.  Not taking medication as she is almost 10 weeks pregnant.  Doing well. Well controlled.   No change in plan.      3. Pregnancy about 9 weeks.   Doing well.   Push fluids.   Continue follow up with Dr. Ni    Follow up- Come back in 3 months for routine visit.     The information in this document, created by the medical scribe for me, accurately reflects the services I personally performed and the decisions made by me. I have reviewed and approved this document for accuracy prior to leaving the patient care area.    Dolores Glasgow MD  [facility]

## 2020-01-24 ENCOUNTER — PRENATAL OFFICE VISIT (OUTPATIENT)
Dept: OBGYN | Facility: CLINIC | Age: 40
End: 2020-01-24
Payer: COMMERCIAL

## 2020-01-24 VITALS
BODY MASS INDEX: 31.62 KG/M2 | DIASTOLIC BLOOD PRESSURE: 83 MMHG | HEART RATE: 111 BPM | WEIGHT: 211 LBS | OXYGEN SATURATION: 98 % | SYSTOLIC BLOOD PRESSURE: 133 MMHG | TEMPERATURE: 98.1 F

## 2020-01-24 DIAGNOSIS — Z34.80 SUPERVISION OF OTHER NORMAL PREGNANCY, ANTEPARTUM: ICD-10-CM

## 2020-01-24 PROCEDURE — 99207 ZZC PRENATAL VISIT: CPT | Performed by: OBSTETRICS & GYNECOLOGY

## 2020-01-24 NOTE — PATIENT INSTRUCTIONS
If you have any questions regarding your visit, Please contact your care team.     [facility] [employer]: [phone]  [facility] CLINIC HOURS TELEPHONE NUMBER       Dipesh Ni M.D.      Anibal King-Medical Assistant    Maribel-  Monica-     Monday-[place]  8:00a.m-4:45 p.m  Tuesday-[place]  9:00a.m-4:00 p.m  Wednesday-[place] 8:00a.m-4:45 p.m.  Thursday-[place]  8:00a.m-4:45 p.m.  Friday-[place]  8:00a.m-4:45 p.m. [facility]  [address]  [place], [place] [address] [phone] ask [facility]  [phone] Fax  Imaging [phone]    [facility] Labor and Delivery  [address]  [place], [place] [address] [phone]    [facility]  [address] [facility] [place] [address] [phone] [facility]  [phone] Fax  Imaging [phone]     Urgent Care locations:    [facility] Monday-Friday  5 pm - 9 pm  Saturday and Sunday   9 am - 5 pm  Monday-Friday   11 am - 9 pm  Saturday and Sunday   9 am - 5 pm   [phone]   If you need a medication refill, please contact your pharmacy. Please allow 3 business days for your refill to be completed.  As always, Thank you for trusting us with your healthcare needs!

## 2020-01-25 NOTE — PROGRESS NOTES
No significant signs, symptoms or concerns except had interval sinusitis, finger laceration (received Tdap), and taking Synthroid and Vit D. Low dose aspirin advised to start.   Discussed special diagnostic and screening tests and plans (y = yes, n = no/declined, p = possibly/considering): first trimester scr with NT and later AFP or with cell free DNA and later AFP = n, cell free DNA= n, CF carrier scr = n, hemoglobinopathy scr= n, SMA, fragile X  and other genetic scr= n, genetic amnio/CVS = n, quad scr = y, survey u/s = n, Level 2 survey u/s with MFM = y.   Total encounter time (physician together with patient) = 15min. Direct counseling, education and care coordination time (physician together with patient) = 10min. This counseling included the following: Advice appropriate to gestational age reviewed including pertinent Checklist items. Discussed condition, tests and care plan including risks, benefits, and alternatives. Problem list updated. TSH next.  A/P:  Nickie was seen today for prenatal care.    Diagnoses and all orders for this visit:    Supervision of other normal pregnancy, antepartum        Dipesh Ni MD

## 2020-01-27 ENCOUNTER — MYC MEDICAL ADVICE (OUTPATIENT)
Dept: OBGYN | Facility: CLINIC | Age: 40
End: 2020-01-27

## 2020-01-27 ENCOUNTER — MEDICAL CORRESPONDENCE (OUTPATIENT)
Dept: HEALTH INFORMATION MANAGEMENT | Facility: CLINIC | Age: 40
End: 2020-01-27

## 2020-01-27 DIAGNOSIS — Z34.80 SUPERVISION OF OTHER NORMAL PREGNANCY, ANTEPARTUM: Primary | ICD-10-CM

## 2020-01-27 DIAGNOSIS — O09.529 ANTEPARTUM MULTIGRAVIDA OF ADVANCED MATERNAL AGE: ICD-10-CM

## 2020-01-27 NOTE — TELEPHONE ENCOUNTER
Per Dr. Ni:          I have placed a future order for the Innatal (Verifi) cell free DNA test. Patient may do this here at the clinic but needs a nursing visit now to complete the special Lab form with [employer] consent as well as the [facility] consent for genetic testing form. I have counseled her regarding the testing. Please notify.   Dipesh Ni MD                       RN called and spoke with patient and made a nurse only visit and lab appointment for 2020 to go over [facility] consent form and [facility] Consent forms. Forms are in RN desk at [facility] labelled with Patient Name and .     Radha Reynoso RN on 2020 at 2:45 PM

## 2020-01-27 NOTE — TELEPHONE ENCOUNTER
"Per OV note 1/24/2020: \"No significant signs, symptoms or concerns except had interval sinusitis, finger laceration (received Tdap), and taking Synthroid and Vit D. Low dose aspirin advised to start.   Discussed special diagnostic and screening tests and plans (y = yes, n = no/declined, p = possibly/considering): first trimester scr with NT and later AFP or with cell free DNA and later AFP = n, cell free DNA= n, CF carrier scr = n, hemoglobinopathy scr= n, SMA, fragile X  and other genetic scr= n, genetic amnio/CVS = n, quad scr = y, survey u/s = n, Level 2 survey u/s with MFM = y. \"    Radha Reynoso RN on 1/27/2020 at 9:39 AM    "

## 2020-01-27 NOTE — TELEPHONE ENCOUNTER
I have placed a future order for the Innatal (Verifi) cell free DNA test. Patient may do this here at the clinic but needs a nursing visit now to complete the special Lab form [facility] as well as the [place] consent for genetic testing form. I have counseled her regarding the testing. Please notify.   Dipesh Ni MD

## 2020-01-29 ENCOUNTER — ALLIED HEALTH/NURSE VISIT (OUTPATIENT)
Dept: NURSING | Facility: CLINIC | Age: 40
End: 2020-01-29
Payer: COMMERCIAL

## 2020-01-29 DIAGNOSIS — Z34.80 SUPERVISION OF OTHER NORMAL PREGNANCY, ANTEPARTUM: Primary | ICD-10-CM

## 2020-01-29 DIAGNOSIS — O09.529 ANTEPARTUM MULTIGRAVIDA OF ADVANCED MATERNAL AGE: ICD-10-CM

## 2020-01-29 DIAGNOSIS — Z34.80 SUPERVISION OF OTHER NORMAL PREGNANCY, ANTEPARTUM: ICD-10-CM

## 2020-01-29 PROCEDURE — 36415 COLL VENOUS BLD VENIPUNCTURE: CPT | Performed by: OBSTETRICS & GYNECOLOGY

## 2020-01-29 PROCEDURE — 99000 SPECIMEN HANDLING OFFICE-LAB: CPT | Performed by: OBSTETRICS & GYNECOLOGY

## 2020-01-29 PROCEDURE — 40000791 ZZHCL STATISTIC VERIFI PRENATAL TRISOMY 21,18,13: Mod: 90 | Performed by: OBSTETRICS & GYNECOLOGY

## 2020-01-29 NOTE — PROGRESS NOTES
Patient came in to sign consent to share genetic information and sign [employer] papers prior to blood draw.     Papers signed and copies given to patient. Copies placed in STAT scanning and original of progenity paperwork given to lab.     Patient and  would like to not know gender at time of results. They will request to have them mailed in sealed envelope for a gender reveal.     Patient did request to hear fetal heart tones but did not want to schedule appointment with provider in clinic at this time. Heart tones not done.     Dot Mata RN on 1/29/2020 at 10:44 AM

## 2020-02-04 LAB — LAB SCANNED RESULT: NORMAL

## 2020-02-05 ENCOUNTER — TELEPHONE (OUTPATIENT)
Dept: OBGYN | Facility: CLINIC | Age: 40
End: 2020-02-05

## 2020-02-05 NOTE — TELEPHONE ENCOUNTER
Reason for Call:  Other call back    Detailed comments: Nickie had planned to  an envelope form the clinic with the gender of her child in it. Wondering if this could be ready soon. Please call and let her know as soon as she is able to pick this up.  Thank you      Phone Number Patient can be reached at: Home number on file [phone] (home)    Best Time: Any    Can we leave a detailed message on this number? YES    Call taken on 2/5/2020 at 10:49 AM by Nini Stephenson

## 2020-02-05 NOTE — TELEPHONE ENCOUNTER
Results received from [facility] testing in [facility].    Testing done:  Innatal Prenatal Screen    Action:  Left message for patient to call back to report NORMAL results.    Gender:  Gender revealed to the patient via letter for patient to . PATIENT NOT VERBALLY TOLD- plan for gender reveal    Dot Mata RN on 2/5/2020 at 11:25 AM

## 2020-02-05 NOTE — TELEPHONE ENCOUNTER
Patient returned call to clinic. Spoke with RN and states Dr. Ni gave her results earlier today.     RN will put gender in an envelope and place at .   RN placed copy of results in STAT scanning.     Patient verbalized understanding and agreed to plan.     Dot Mata RN on 2/5/2020 at 11:33 AM

## 2020-02-05 NOTE — TELEPHONE ENCOUNTER
[facility] Call Center    Phone Message    May a detailed message be left on voicemail: yes     Reason for Call: Order(s): Other:   Reason for requested: Genetic needle testing  Date needed: N/A  Provider name: Dr. Ni    [facility] called stating an updated requisition form needs to be faxed and a verbal consent via phone call is required in order to have the genetic needle testing sent.    Please fax new form to  ATTN: Gela Pierce   [phone]    And call to provide verbal consent to client services at [phone]    Action Taken: Message routed to:  [facility] p [phone]    Travel Screening: Not Applicable

## 2020-02-05 NOTE — TELEPHONE ENCOUNTER
RN received another call from [facility] stating they needed verbal consent to disgard patient's blood samples if no further testing was needed.     [facility] rep, Shravan, states that they were flagged for the possible need for further testing due to extra samples of blood being shipped to them. RN reviewed original order sent and states only NIPT was ordered and no further testing. [facility] had also spoke with patient and she had refused other testing but they needed confirmation from provider.     RN spoke to Dr. Ni and he also agrees with plan for NO further testing at this time. RN told [facility] that they can disguard of the samples.     Dot Mata RN on 2/5/2020 at 12:08 PM

## 2020-02-05 NOTE — TELEPHONE ENCOUNTER
Patient's NIPT results came back as normal, please see other encounter from today.     RN did speak with patient and she is not aware of this testing needing to be done.    RN will route to provider for further advisement. Do not see additional testing was ordered.     Dot Mata, RN on 2/5/2020 at 11:40 AM

## 2020-02-24 ENCOUNTER — PRENATAL OFFICE VISIT (OUTPATIENT)
Dept: OBGYN | Facility: CLINIC | Age: 40
End: 2020-02-24
Payer: COMMERCIAL

## 2020-02-24 VITALS
OXYGEN SATURATION: 99 % | WEIGHT: 212 LBS | TEMPERATURE: 97 F | BODY MASS INDEX: 31.77 KG/M2 | SYSTOLIC BLOOD PRESSURE: 129 MMHG | DIASTOLIC BLOOD PRESSURE: 78 MMHG | HEART RATE: 102 BPM

## 2020-02-24 DIAGNOSIS — Z34.80 SUPERVISION OF OTHER NORMAL PREGNANCY, ANTEPARTUM: Primary | ICD-10-CM

## 2020-02-24 DIAGNOSIS — E06.3 HYPOTHYROIDISM DUE TO HASHIMOTO'S THYROIDITIS: ICD-10-CM

## 2020-02-24 PROCEDURE — 99207 ZZC PRENATAL VISIT: CPT | Performed by: OBSTETRICS & GYNECOLOGY

## 2020-02-24 RX ORDER — ASPIRIN 81 MG/1
81 TABLET ORAL DAILY
COMMUNITY
End: 2020-07-22

## 2020-02-24 NOTE — PATIENT INSTRUCTIONS
If you have any questions regarding your visit, Please contact your care team.     [employer]: [phone]  [facility] CLINIC HOURS TELEPHONE NUMBER       Dipesh Ni M.D.      Anibal King-Medical Assistant    Maribel-  Monica-     Monday-[place]  8:00a.m-4:45 p.m  Tuesday-[place]  9:00a.m-4:00 p.m  Wednesday-[place] 8:00a.m-4:45 p.m.  Thursday-[place]  8:00a.m-4:45 p.m.  Friday-[place]  8:00a.m-4:45 p.m. [facility]  [address]  [place], [place] [address] [phone] ask [facility]  [phone] Fax  Imaging [phone]    [facility] Labor and Delivery  [address]  [place], [place] [address] [phone]    [facility]  [address] [facility] [place] [address] [phone] [facility]  [phone] Fax  Imaging [phone]     Urgent Care locations:    [facility] Monday-Friday  5 pm - 9 pm  Saturday and Sunday   9 am - 5 pm  Monday-Friday   11 am - 9 pm  Saturday and Sunday   9 am - 5 pm   [phone]   If you need a medication refill, please contact your pharmacy. Please allow 3 business days for your refill to be completed.  As always, Thank you for trusting us with your healthcare needs!

## 2020-02-24 NOTE — PROGRESS NOTES
No significant signs, symptoms or concerns except some chronic back pain and has chiropractic and massage. Did have neg Innatal screening test. Taking ASA 81 mg/d.   Total encounter time (physician together with patient) = 15min. Direct counseling, education and care coordination time (physician together with patient) = 10min. This counseling included the following: Advice appropriate to gestational age reviewed including pertinent Checklist items. Discussed condition, tests and care plan including risks, benefits, and alternatives. Problem list updated. AFP next.  A/P:  Nickie was seen today for prenatal care.    Diagnoses and all orders for this visit:    Supervision of other normal pregnancy, antepartum  -     Cancel: TSH with free T4 reflex  -     TSH with free T4 reflex; Future    Hypothyroidism due to Hashimoto's thyroiditis  -     Cancel: TSH with free T4 reflex  -     TSH with free T4 reflex; Future        Dipesh Ni MD

## 2020-03-09 ENCOUNTER — PRENATAL OFFICE VISIT (OUTPATIENT)
Dept: OBGYN | Facility: CLINIC | Age: 40
End: 2020-03-09
Payer: COMMERCIAL

## 2020-03-09 VITALS
WEIGHT: 212.7 LBS | BODY MASS INDEX: 31.87 KG/M2 | HEART RATE: 106 BPM | SYSTOLIC BLOOD PRESSURE: 135 MMHG | DIASTOLIC BLOOD PRESSURE: 80 MMHG

## 2020-03-09 DIAGNOSIS — E06.3 HYPOTHYROIDISM DUE TO HASHIMOTO'S THYROIDITIS: ICD-10-CM

## 2020-03-09 DIAGNOSIS — O09.529 ANTEPARTUM MULTIGRAVIDA OF ADVANCED MATERNAL AGE: ICD-10-CM

## 2020-03-09 DIAGNOSIS — Z34.80 SUPERVISION OF OTHER NORMAL PREGNANCY, ANTEPARTUM: Primary | ICD-10-CM

## 2020-03-09 LAB
SPECIMEN SOURCE: NORMAL
WET PREP SPEC: NORMAL

## 2020-03-09 PROCEDURE — 99207 ZZC PRENATAL VISIT: CPT | Performed by: OBSTETRICS & GYNECOLOGY

## 2020-03-09 PROCEDURE — 87210 SMEAR WET MOUNT SALINE/INK: CPT | Performed by: OBSTETRICS & GYNECOLOGY

## 2020-03-09 RX ORDER — VITAMIN A ACETATE, .BETA.-CAROTENE, ASCORBIC ACID, CHOLECALCIFEROL, .ALPHA.-TOCOPHEROL ACETATE, DL-, THIAMINE MONONITRATE, RIBOFLAVIN, NIACINAMIDE, PYRIDOXINE HYDROCHLORIDE, FOLIC ACID, CYANOCOBALAMIN, CALCIUM CARBONATE, FERROUS FUMARATE, ZINC OXIDE, AND CUPRIC OXIDE 2000; 2000; 120; 400; 22; 1.84; 3; 20; 10; 1; 12; 200; 27; 25; 2 [IU]/1; [IU]/1; MG/1; [IU]/1; MG/1; MG/1; MG/1; MG/1; MG/1; MG/1; UG/1; MG/1; MG/1; MG/1; MG/1
1 TABLET ORAL DAILY
COMMUNITY
End: 2020-09-24

## 2020-03-09 NOTE — Clinical Note
Please arrange Level 2 ultrasound with MFM at  site if possible. This is for AMA. I will sign the paper referral order later as needed. Remind patient to do Labs missed on 3/9 too.

## 2020-03-09 NOTE — PATIENT INSTRUCTIONS
If you have any questions regarding your visit, Please contact your care team.     [employer] [facility]: [phone]  [facility] CLINIC HOURS TELEPHONE NUMBER       Dipesh Ni M.D.      Anibal King-Medical Assistant    Maribel-  Monica-     Monday-[place]  8:00a.m-4:45 p.m  Tuesday-[place]  9:00a.m-4:00 p.m  Wednesday-[place] 8:00a.m-4:45 p.m.  Thursday-[place]  8:00a.m-4:45 p.m.  Friday-[place]  8:00a.m-4:45 p.m. [facility]  [address]  [place], [place] [address] [phone] ask [facility]  [phone] Fax  Imaging [phone]    [facility] Labor and Delivery  [address]  [place], [place] [address] [phone]    [facility]  [address] [facility] [place] [address] [phone] [facility]  [phone] Fax  Imaging [phone]     Urgent Care locations:    [facility] Monday-Friday  5 pm - 9 pm  Saturday and Sunday   9 am - 5 pm  Monday-Friday   11 am - 9 pm  Saturday and Sunday   9 am - 5 pm   [phone]   If you need a medication refill, please contact your pharmacy. Please allow 3 business days for your refill to be completed.  As always, Thank you for trusting us with your healthcare needs!

## 2020-03-10 NOTE — PROGRESS NOTES
No significant signs, symptoms or concerns except some increased vaginal discharge.    Total encounter time (physician together with patient) = 15min. Direct counseling, education and care coordination time (physician together with patient) = 10min. This counseling included the following: Advice appropriate to gestational age reviewed including pertinent Checklist items. Discussed condition, tests and care plan including risks, benefits, and alternatives. Problem list updated. Level 2 ultrasound next.  A/P:  Nickie was seen today for prenatal care.    Diagnoses and all orders for this visit:    Supervision of other normal pregnancy, antepartum  -     Wet prep  -     Cancel: TSH with free T4 reflex  -     Cancel: Vitamin D Deficiency  -     Cancel: Alpha fetoprotein maternal screen    Hypothyroidism due to Hashimoto's thyroiditis  -     Cancel: TSH with free T4 reflex        Dipesh Ni MD          3

## 2020-03-10 NOTE — PROGRESS NOTES
Dipesh Ni MD  P  Ob/Gyn Ma/Lpn               Please arrange Level 2 ultrasound with MFM at  site if possible. This is for AMA. I will sign the paper referral order later as needed. Remind patient to do Labs missed on 3/9 too.        MFM referral form completed by RN. Awaiting signature of in- clinic provider.     No pending lab orders. RN routed message back to provider to clarify missing labs.     Dot Mata RN on 3/10/2020 at 8:13 AM

## 2020-03-10 NOTE — PROGRESS NOTES
MFM referral signed, faxed, and copy placed in stat scanning.     Dot Mata RN on 3/10/2020 at 9:07 AM

## 2020-03-10 NOTE — PROGRESS NOTES
Dipesh Ni MD  P Mg Ob/Gyn Triage               Labs were cancelled on 3/9 instead of placed to future order. I have reordered them.      RN attempted to call patient, no answer, no voicemail left due to no personal identifier. RN sent a [facility] [employer] message relaying provider message about MFM referral and reminder to do labs.     Dot Mata RN on 3/10/2020 at 12:30 PM

## 2020-03-14 DIAGNOSIS — Z34.80 SUPERVISION OF OTHER NORMAL PREGNANCY, ANTEPARTUM: ICD-10-CM

## 2020-03-14 DIAGNOSIS — E06.3 HYPOTHYROIDISM DUE TO HASHIMOTO'S THYROIDITIS: ICD-10-CM

## 2020-03-14 LAB
T4 FREE SERPL-MCNC: 0.92 NG/DL (ref 0.76–1.46)
TSH SERPL DL<=0.005 MIU/L-ACNC: 9.47 MU/L (ref 0.4–4)

## 2020-03-14 PROCEDURE — 82105 ALPHA-FETOPROTEIN SERUM: CPT | Mod: 90 | Performed by: OBSTETRICS & GYNECOLOGY

## 2020-03-14 PROCEDURE — 99000 SPECIMEN HANDLING OFFICE-LAB: CPT | Performed by: OBSTETRICS & GYNECOLOGY

## 2020-03-14 PROCEDURE — 82306 VITAMIN D 25 HYDROXY: CPT | Performed by: OBSTETRICS & GYNECOLOGY

## 2020-03-14 PROCEDURE — 84439 ASSAY OF FREE THYROXINE: CPT | Performed by: OBSTETRICS & GYNECOLOGY

## 2020-03-14 PROCEDURE — 84443 ASSAY THYROID STIM HORMONE: CPT | Performed by: OBSTETRICS & GYNECOLOGY

## 2020-03-14 PROCEDURE — 36415 COLL VENOUS BLD VENIPUNCTURE: CPT | Performed by: OBSTETRICS & GYNECOLOGY

## 2020-03-16 LAB — DEPRECATED CALCIDIOL+CALCIFEROL SERPL-MC: 64 UG/L (ref 20–75)

## 2020-03-17 LAB
# FETUSES US: NORMAL
# FETUSES: 1
AFP ADJ MOM AMN: 0.91
AFP SERPL-MCNC: 29 NG/ML
AGE - REPORTED: 40.6 YR
CURRENT SMOKER: YES
CURRENT SMOKER: YES
DIABETES STATUS PATIENT: NO
FAMILY MEMBER DISEASES HX: NO
FAMILY MEMBER DISEASES HX: NO
GA METHOD: NORMAL
GA METHOD: NORMAL
GA: NORMAL WK
IDDM PATIENT QL: NO
INTEGRATED SCN PATIENT-IMP: NORMAL
LMP START DATE: NORMAL
MONOCHORIONIC TWINS: NO
SERVICE CMNT-IMP: NO
SPECIMEN DRAWN SERPL: NORMAL
VALPROIC/CARBAMAZEPINE STATUS: NO
WEIGHT UNITS: NORMAL

## 2020-03-19 ENCOUNTER — MYC MEDICAL ADVICE (OUTPATIENT)
Dept: OBGYN | Facility: CLINIC | Age: 40
End: 2020-03-19

## 2020-03-19 DIAGNOSIS — E03.9 HYPOTHYROIDISM, UNSPECIFIED TYPE: Primary | ICD-10-CM

## 2020-03-19 RX ORDER — LEVOTHYROXINE SODIUM 175 UG/1
175 TABLET ORAL DAILY
Qty: 90 TABLET | Refills: 1 | Status: SHIPPED | OUTPATIENT
Start: 2020-03-19 | End: 2020-11-19

## 2020-03-19 NOTE — TELEPHONE ENCOUNTER
Noted. Recommend that the levothyroxine be increased to 175 mcg daily now. I have sent a new prescription to the [facility] pharmacy. Please notify.   Dipesh Ni MD

## 2020-03-19 NOTE — TELEPHONE ENCOUNTER
"Per 3/14/2020 TSH note: \"Written by Dipesh Ni MD on 3/17/2020  9:23 PM   Satisfactory results except TSH is sub-optimal though the free T4 measured normal.   If you have taken the Synthroid consistently for the past 8 weeks then advise the dose be increased at this time in pregnancy to 150 mcg/d. Please let us know if that is the case and a new prescription will be sent to the pharmacy.     Otherwise, will review at next appointment as planned. \"    Radha Reynoso RN on 3/19/2020 at 1:58 PM    "

## 2020-03-24 ENCOUNTER — RESULTS ONLY (OUTPATIENT)
Dept: LAB | Age: 40
End: 2020-03-24

## 2020-03-24 ENCOUNTER — OFFICE VISIT (OUTPATIENT)
Dept: URGENT CARE | Facility: URGENT CARE | Age: 40
End: 2020-03-24
Payer: COMMERCIAL

## 2020-03-24 DIAGNOSIS — Z20.822 SUSPECTED COVID-19 VIRUS INFECTION: Primary | ICD-10-CM

## 2020-03-24 NOTE — PATIENT INSTRUCTIONS
Please use the information at the end of this document to sign up for [facility] where you can get your results and a message about those results sent to you through the [employer] application. If you do not have mychart we will call you with your results but it may take longer.    Regardless of if you have been tested or not:  Patient who have symptoms (cough, fever, or shortness of breath), need to isolate for 7 days from when symptoms started OR 72 hours after fever resolves (without fever reducing medications) AND improvement of respiratory symptoms (whichever is longer).      Isolate yourself at home (in own room/own bathroom if possible)    Do Not allow any visitors    Do Not go to work or school    Do Not go to [employer],  centers, shopping, or other public places.    Do Not shake hands.    Avoid close and intimate contact with others (hugging, kissing).    Follow CDC recommendations for household cleaning of frequently touched services.     After the initial 7 days, continue to isolate yourself from household members as much as possible. To continue decrease the risk of community spread and exposure, you and any members of your household should limit activities in public for 14 days after starting home isolation.     You can reference the following CDC link for helpful home isolation/care tips:  https://www.cdc.gov/coronavirus/2019-ncov/downloads/10Things.pdf    Protect Others:    Cover Your Mouth and Nose with a mask, disposable tissue or wash cloth to avoid spreading germs to others.    Wash your hands and face frequently with soap and water    Call Back If: Breathing difficulty develops or you become worse.    For more information about COVID19 and options for caring for yourself at home, please visit the CDC website at https://www.cdc.gov/coronavirus/2019-ncov/about/steps-when-sick.html  For more options for care at [facility], please visit our website at  [email or web address]

## 2020-03-25 LAB
COVID-19 VIRUS PCR TO MAYO - RESULT: NORMAL
SPECIMEN SOURCE: NORMAL

## 2020-04-15 ENCOUNTER — MYC MEDICAL ADVICE (OUTPATIENT)
Dept: OBGYN | Facility: CLINIC | Age: 40
End: 2020-04-15

## 2020-04-15 NOTE — TELEPHONE ENCOUNTER
Pt's last prenatal visit was on 3/9/2020 with Dr. Ni.  Pt was advised to return in 4 weeks (around 4/6/2020).

## 2020-05-04 ENCOUNTER — PRENATAL OFFICE VISIT (OUTPATIENT)
Dept: OBGYN | Facility: CLINIC | Age: 40
End: 2020-05-04
Payer: COMMERCIAL

## 2020-05-04 VITALS
HEART RATE: 104 BPM | SYSTOLIC BLOOD PRESSURE: 128 MMHG | WEIGHT: 219.6 LBS | BODY MASS INDEX: 32.9 KG/M2 | DIASTOLIC BLOOD PRESSURE: 77 MMHG

## 2020-05-04 DIAGNOSIS — O09.529 ANTEPARTUM MULTIGRAVIDA OF ADVANCED MATERNAL AGE: Primary | ICD-10-CM

## 2020-05-04 DIAGNOSIS — E03.9 HYPOTHYROIDISM, UNSPECIFIED TYPE: ICD-10-CM

## 2020-05-04 LAB
GLUCOSE 1H P 50 G GLC PO SERPL-MCNC: 96 MG/DL (ref 60–129)
HGB BLD-MCNC: 11 G/DL (ref 11.7–15.7)
TSH SERPL DL<=0.005 MIU/L-ACNC: 2.12 MU/L (ref 0.4–4)

## 2020-05-04 PROCEDURE — 82950 GLUCOSE TEST: CPT | Performed by: OBSTETRICS & GYNECOLOGY

## 2020-05-04 PROCEDURE — 00000218 ZZHCL STATISTIC OBHBG - HEMOGLOBIN: Performed by: OBSTETRICS & GYNECOLOGY

## 2020-05-04 PROCEDURE — 99207 ZZC PRENATAL VISIT: CPT | Performed by: OBSTETRICS & GYNECOLOGY

## 2020-05-04 PROCEDURE — 84443 ASSAY THYROID STIM HORMONE: CPT | Performed by: OBSTETRICS & GYNECOLOGY

## 2020-05-04 PROCEDURE — 36415 COLL VENOUS BLD VENIPUNCTURE: CPT | Performed by: OBSTETRICS & GYNECOLOGY

## 2020-05-04 NOTE — PROGRESS NOTES
24w4d  Doing well without issues/concerns.  Routine anticipatory guidance.   MFMUS was normal. Repeat at 28 weeks for growth.  RTC 4wk.  Glucola given. Plan for  GCT, Hgb.    MFM Impression  =========    1) Intrauterine pregnancy at 20 5/7 weeks gestational age.  2) None of the anomalies commonly detected by ultrasound were evident in the detailed fetal anatomic survey described above.  3) Growth parameters and estimated fetal weight were consistent with an appropriate for gestation age pattern of growth.  4) The amniotic fluid volume appeared normal.    Recommendation  ==============    We discussed the findings on today's ultrasound with the patient.    Alternatives available for detecting fetal anomalies, aneuploidy and predicting developmental outcome for this pregnancy were thoroughly discussed. The risks, benefits and  limitations of maternal serum screening, cell-free DNA screening, ultrasound and genetic amniocentesis were thoroughly reviewed with the patient. We discussed the  availability of amniocentesis for the precise diagnosis of chromosomal abnormalities including the associated procedure-related risk of pregnancy loss of 1/300 ? 1/500.  Nickie had a normal NIPT. The patient declined all further aneuploidy screening and diagnostic tests.    A repeat assessment of fetal growth is scheduled in our office at 28 weeks gestation given the history of hypertension and hypothyroidism.    Return to primary provider for continued prenatal care.    Thank you for the opportunity to participate in the care of this patient. If you have questions regarding today's evaluation or if we can be of further service, please contact the  [facility].    **Fetal anomalies may be present but not detected**.    REPORT SIGNED BY: PREM WARREN MD      ICD-10-CM    1. Antepartum multigravida of advanced maternal age  O09.529 Glucose tolerance gest screen 1 hour     OB hemoglobin     TSH with free T4 reflex    2. Hypothyroidism, unspecified type  E03.9 TSH with free T4 reflex     CEPHAS AGBEH, MD.

## 2020-05-04 NOTE — TELEPHONE ENCOUNTER
[facility] Call Center    Phone Message    May a detailed message be left on voicemail: yes     Reason for Call: The patient is 25 weeks.  She stated her last visit was 3/9/20.  She is requesting a visit with Dr. Ni.  Please advise.  Thank you.     Action Taken: Message routed to:  [facility] p [phone]    Travel Screening: Not Applicable

## 2020-05-04 NOTE — PATIENT INSTRUCTIONS
If you have any questions regarding your visit, Please contact your care team.  [employer] [facility]: [phone]  [facility] CLINIC HOURS TELEPHONE NUMBER   Cephas Agbeh, M.D.      Anibal López-  Monica-         Monday-[place]    8:00a.m-4:45 p.m    Tuesday--[place]     8:00a.m-4:45 p.m.    Thursday-[place]    8:00a.m-4:45 p.m.    Friday-[place]    8:00a.m-4:45 p.m    [facility]   [address] [place] [address]   [phone]-Ask for [facility]   Fax [phone]   Wfogggv-[phone]     [facility]   [address] [place] [address] [place] [address]   [phone]    [facility]  [address] [address]  [phone]  Pardjea-[phone]   Urgent Care locations:    [facility] Monday-Friday  5 pm - 9 pm  Saturday and Sunday   9 am - 5 pm   Monday-Friday   5 pm - 9 pm  Saturday and Sunday  9 am - 5 pm    [phone]   If you need a medication refill, please contact your pharmacy. Please allow 3 business days for your refill to be completed.  As always, Thank you for trusting us with your healthcare needs!

## 2020-06-01 ENCOUNTER — PRENATAL OFFICE VISIT (OUTPATIENT)
Dept: OBGYN | Facility: CLINIC | Age: 40
End: 2020-06-01
Payer: COMMERCIAL

## 2020-06-01 VITALS
SYSTOLIC BLOOD PRESSURE: 125 MMHG | DIASTOLIC BLOOD PRESSURE: 76 MMHG | WEIGHT: 226 LBS | BODY MASS INDEX: 33.86 KG/M2 | HEART RATE: 98 BPM | TEMPERATURE: 98 F | OXYGEN SATURATION: 98 %

## 2020-06-01 DIAGNOSIS — Z34.80 SUPERVISION OF OTHER NORMAL PREGNANCY, ANTEPARTUM: ICD-10-CM

## 2020-06-01 PROCEDURE — 99207 ZZC PRENATAL VISIT: CPT | Performed by: OBSTETRICS & GYNECOLOGY

## 2020-06-01 NOTE — PROGRESS NOTES
No significant signs, symptoms or concerns except GERD. No MS flares. MFM follow-up at 34 weeks planned. Induction considerations for 39-40 weeks reviewed.    Total encounter time (physician together with patient) = 15min. Direct counseling, education and care coordination time (physician together with patient) = 10min. This counseling included the following: Advice appropriate to gestational age reviewed including pertinent Checklist items. Discussed condition, tests and care plan including risks, benefits, and alternatives. Problem list updated.   A/P:  Nickie was seen today for prenatal care.    Diagnoses and all orders for this visit:    Supervision of other normal pregnancy, antepartum        Dipesh Ni MD

## 2020-06-01 NOTE — PATIENT INSTRUCTIONS
If you have any questions regarding your visit, Please contact your care team.     [employer]: [phone]  [facility] CLINIC HOURS TELEPHONE NUMBER       Dipesh Ni M.D.      Anibal King-Medical Assistant    Maribel-  Monica-     Monday-[place]  8:00a.m-4:45 p.m  Tuesday-[place]  9:00a.m-4:00 p.m  Wednesday-[place] 8:00a.m-4:45 p.m.  Thursday-[place]  8:00a.m-4:45 p.m.  Friday-[place]  8:00a.m-4:45 p.m. [facility]  [address]  [place], [place] [address] [phone] ask [facility]  [phone] Fax  Imaging [phone]    [facility]  [address]  [place], [place] [address] [phone]    [facility]  [address] [place] [address] [phone] [facility]  [phone] Fax  Imaging [id number]-[phone]     Urgent Care locations:    [facility] Monday-Friday  5 pm - 9 pm  Saturday and Sunday   9 am - 5 pm  Monday-Friday   11 am - 9 pm  Saturday and Sunday   9 am - 5 pm   [phone]   If you need a medication refill, please contact your pharmacy. Please allow 3 business days for your refill to be completed.  As always, Thank you for trusting us with your healthcare needs!

## 2020-06-10 ENCOUNTER — NURSE TRIAGE (OUTPATIENT)
Dept: NURSING | Facility: CLINIC | Age: 40
End: 2020-06-10

## 2020-06-10 NOTE — TELEPHONE ENCOUNTER
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[facility]  Via MD    Pt calling in because of back pain   since  Monday    Does have appt scheduled for tomorrow Thursday with  Chiropractor -but not till 5:30 pm     Pt would like to talk to provider now -- because of the pain being so intense - not sure can wait till tomorrow  Pain greater than 10/10 with movement    Has gotten steroids in the past    ONCALL OB for [facility] paged - actually IS Dr Ni    Will have him call pt back directly at [phone] for advisement     Protocol and care advice reviewed  Caller states understanding of the recommended disposition    Advised to call back if further questions or concerns      Stephen Jimenez , RN / [place] [facility]    Additional Information    Negative: Passed out (i.e., lost consciousness, collapsed and was not responding)    Negative: Shock suspected (e.g., cold/pale/clammy skin, too weak to stand, low BP, rapid pulse)    Negative: Sounds like a life-threatening emergency to the triager    [1] SEVERE back pain (e.g., excruciating, unable to do any normal activities) AND [2] not improved 2 hours after pain medicine    Negative: [1] SEVERE abdominal pain AND [2] present > 1 hour    Negative: [1] Abdominal pain AND [2] age > 60    Negative: Weakness of a leg or foot (e.g., unable to bear weight, dragging foot)    Negative: Unable to walk    Negative: Patient sounds very sick or weak to the triager    Negative: [1] SEVERE back pain (e.g., excruciating) AND [2] sudden onset AND [3] age > 60    Negative: [1] Unable to urinate (or only a few drops) > 4 hours AND [2] bladder feels very full (e.g., palpable bladder or strong urge to urinate)    Negative: [1] Loss of bladder or bowel control (urine or bowel incontinence; wetting self, leaking stool) AND [2] new onset    Negative: Numbness in groin or rectal area (i.e., loss of sensation)    Negative: Major injury to the back (e.g., MVA, fall > 10 feet or 3 meters,  penetrating injury, etc.)    Negative: Followed a tailbone injury    Negative: [1] Pain in the upper back over the ribs (rib cage) AND [2] radiates (travels, goes) into chest    Negative: [1] Pain in the upper back over the ribs (rib cage) AND [2] worsened by coughing (or clearly increases with breathing)    Negative: Back pain during pregnancy    Negative: Pain mainly in flank (i.e., in the side, over the lower ribs or just below the ribs)    Protocols used: BACK PAIN-A-AH

## 2020-06-11 ENCOUNTER — VIRTUAL VISIT (OUTPATIENT)
Dept: FAMILY MEDICINE | Facility: CLINIC | Age: 40
End: 2020-06-11
Payer: COMMERCIAL

## 2020-06-11 ENCOUNTER — NURSE TRIAGE (OUTPATIENT)
Dept: NURSING | Facility: CLINIC | Age: 40
End: 2020-06-11

## 2020-06-11 ENCOUNTER — TELEPHONE (OUTPATIENT)
Dept: OBGYN | Facility: CLINIC | Age: 40
End: 2020-06-11

## 2020-06-11 DIAGNOSIS — M54.41 ACUTE BILATERAL LOW BACK PAIN WITH RIGHT-SIDED SCIATICA: Primary | ICD-10-CM

## 2020-06-11 PROCEDURE — 99214 OFFICE O/P EST MOD 30 MIN: CPT | Mod: TEL | Performed by: PHYSICIAN ASSISTANT

## 2020-06-11 RX ORDER — PREDNISONE 20 MG/1
20 TABLET ORAL DAILY
Qty: 5 TABLET | Refills: 0 | Status: SHIPPED | OUTPATIENT
Start: 2020-06-11 | End: 2020-07-22

## 2020-06-11 ASSESSMENT — PAIN SCALES - GENERAL: PAINLEVEL: EXTREME PAIN (8)

## 2020-06-11 NOTE — PATIENT INSTRUCTIONS
At [facility], we strive to deliver an exceptional experience to you, every time we see you. If you receive a survey, please complete it as we do value your feedback.  If you have MyChart, you can expect to receive results automatically within 24 hours of their completion.  Your provider will send a note interpreting your results as well.   If you do not have MyChart, you should receive your results in about a week by mail.    Your care team:                            Family Medicine Internal Medicine   MD Artie Holm MD Shantel Branch-Fleming, MD Katya Georgiev PA-C Megan Hill, APRRAMIRO Tinsley, MD Pediatrics   Sammy Morris, PAJOHNNY Perdomo, MD Angelia Rust APRN CNP   MD Maxine Holland MD Deborah Mielke, MD Kim Thein, APRN [facility]      Clinic hours: Monday - Thursday 7 am-7 pm; Fridays 7 am-5 pm.   Urgent care: Monday - Friday 11 am-9 pm; Saturday and Sunday 9 am-5 pm.    Clinic: [phone]       [facility]: Monday - Thursday 8 am - 7 pm; Friday 8 am - 6 pm  [facility]: [phone]     Use [email or web address] for 24/7 diagnosis and treatment of dozens of conditions.

## 2020-06-11 NOTE — TELEPHONE ENCOUNTER
6/10 pm PC:   Patient inquiry/concerns (127): 30 weeks gest. Recurrent low back pain. No other significant or specific sx. Has tried Tylenol, cold/heat.   History and available/pertinent chart info rev'd.  Discussed condition, E&M considerations, prognosis, appropriate precautions and instructions and follow-up.   Reviewed clinic priority, specialty, Urgent Care, ED and Hospital evaluation indications as appropriate.  Medications and prescriptions given as noted.  I reviewed side effects, risks, benefits and instructions on proper use.  Reviewed supportive care measures.  Plan: Has chiropractic appointment for 6/11. May also see FP or Ortho for this. Await back evaluation and treatment advice. Limited Vicodin may be used. May also use injected or short course of oral steroids if advised.   Patient understands and agrees with plan. All questions answered. Patient to call if additional questions, concerns or significant changes in status.    Dipesh Ni MD

## 2020-06-11 NOTE — PROGRESS NOTES
"Nickie Garcia is a 40 year old female who is being evaluated via a billable telephone visit.      The patient has been notified of following:     \"This telephone visit will be conducted via a call between you and your physician/provider. We have found that certain health care needs can be provided without the need for a physical exam.  This service lets us provide the care you need with a short phone conversation.  If a prescription is necessary we can send it directly to your pharmacy.  If lab work is needed we can place an order for that and you can then stop by our lab to have the test done at a later time.    Telephone visits are billed at different rates depending on your insurance coverage. During this emergency period, for some insurers they may be billed the same as an in-person visit.  Please reach out to your insurance provider with any questions.    If during the course of the call the physician/provider feels a telephone visit is not appropriate, you will not be charged for this service.\"    Patient has given verbal consent for Telephone visit?  Yes    What phone number would you like to be contacted at? [phone]    How would you like to obtain your AVS? Tiffaniehart    Subjective     Nickie Garcia is a 40 year old female who presents via phone visit today for the following health issues:    HPI  Back Pain       Duration: about 4 days ago        Specific cause: patient was sitting on her cough working  from home on Monday, & 30 weeks pregnant    Description:   Location of pain: low back right  Character of pain: sharp, dull ache, stabbing, burning and shooting  Pain radiation:radiates into the right thigh  New numbness or weakness in legs, not attributed to pain:  no     Intensity: Currently 8/10    History:   Pain interferes with job: YES  History of back problems: yes prior back problems  Any previous MRI or X-rays: Yes- at [place].    Sees a specialist for back pain:  No  Therapies tried " without relief: old vicodin prescription , acetaminophen (Tylenol), cold and heat    Alleviating factors:   Improved by: none      Precipitating factors:  Worsened by: Lifting, Bending, Standing and Sitting          Accompanying Signs & Symptoms:  Risk of Fracture:  None  Risk of Cauda Equina:  None  Risk of Infection:  None  Risk of Cancer:  None  Risk of Ankylosing Spondylitis:  Onset at age <35, male, AND morning back stiffness. no       Patient reports that she called Dr. Raine JIMENES and he approved her to take prednisone at this time because she is in 3rd trimester, but he himself could not prescribe it because he is not familiar with the doses that are used for low back treatment.            Patient Active Problem List   Diagnosis     CARDIOVASCULAR SCREENING; LDL GOAL LESS THAN 160     Hashimotos thyroiditis     Vitamin D deficiency     Anxiety disorder     Multiple sclerosis (H)     History of abnormal cervical Pap smear     Essential hypertension     Supervision of other normal pregnancy, antepartum     Antepartum multigravida of advanced maternal age     Discogenic low back pain     Kidney stone     Class 1 obesity due to excess calories with serious comorbidity and body mass index (BMI) of 30.0 to 30.9 in adult     Past Surgical History:   Procedure Laterality Date     HC COLP CERVIX/UPPER VAGINA  2017     HC DILATION/CURETTAGE DIAG/THER NON OB  2017    benign polyp     HC INSERTION INTRAUTERINE DEVICE       HC REMOVE INTRAUTERINE DEVICE       HC TOOTH EXTRACTION W/FORCEP       PE TUBES       TONSILLECTOMY & ADENOIDECTOMY         Social History     Tobacco Use     Smoking status: Former Smoker     Types: Cigarettes     Last attempt to quit: 12/15/2019     Years since quittin.4     Smokeless tobacco: Never Used   Substance Use Topics     Alcohol use: No     Family History   Problem Relation Age of Onset     Hypertension Father      Cerebrovascular Disease Father 54        hemorrhagic stroke d/t  rupture of brain aneurysm     Aneurysm Father      Neurologic Disorder Mother         ms     No Known Problems Sister      Cancer Maternal Grandmother         brain     Multiple Sclerosis Maternal Grandfather      Heart Disease Paternal Grandmother         chf     Diabetes Paternal Grandfather      Diabetes Other      Cancer Paternal Uncle         cancer in eye and liver          Current Outpatient Medications   Medication Sig Dispense Refill     aspirin 81 MG EC tablet Take 81 mg by mouth daily       levothyroxine (SYNTHROID/LEVOTHROID) 175 MCG tablet Take 1 tablet (175 mcg) by mouth daily 90 tablet 1     predniSONE (DELTASONE) 20 MG tablet Take 1 tablet (20 mg) by mouth daily for 5 days 5 tablet 0     Prenatal Vit-Fe Fumarate-FA (PNV PRENATAL PLUS MULTIVITAMIN) 27-1 MG TABS per tablet Take 1 tablet by mouth daily       Allergies   Allergen Reactions     Wellbutrin [Bupropion] Other (See Comments)     Optic neuritis       Reviewed and updated as needed this visit by Provider  Tobacco  Allergies  Meds  Problems  Med Hx  Surg Hx  Fam Hx         Review of Systems   Constitutional, HEENT, cardiovascular, pulmonary, gi and gu systems are negative, except as otherwise noted.       Objective   Reported vitals:  LMP 11/14/2019 (Exact Date)    healthy, alert and no distress  PSYCH: Alert and oriented times 3; coherent speech, normal   rate and volume, able to articulate logical thoughts, able   to abstract reason, no tangential thoughts, no hallucinations   or delusions  Her affect is normal  RESP: No cough, no audible wheezing, able to talk in full sentences  Remainder of exam unable to be completed due to telephone visits    Diagnostic Test Results:  Labs reviewed in Epic        Assessment/Plan:  1. Acute bilateral low back pain with right-sided sciatica  Prednisone 20 mg daily for 5 days. Prednisone is allowed at the lowest therapeutic  doses in 2nd and 3rd trimester. Also, discussed prednisone use with Dr Tinsley,  whom agreed with the plan. Patient reports that she consulted her OBGYN dr cruz last night and he also approved Prednisone use for her.   Tylenol 500 mg every 5-6 hours as needed for pain  Apply heating pad  Make appointment with physical therapy. Patient also has appointment with a chiropractor today. Advised to ensure that chiropractor is specialized with treatment back pain in pregnancy.   Daily low back stretches while laying on the back     - [facility]OPRACTIC REFERRAL; Future    Return in about 1 week (around 6/18/2020), or if symptoms worsen or fail to improve.      Phone call duration:  14 minutes    Verna Cordero PA-C

## 2020-06-12 NOTE — TELEPHONE ENCOUNTER
Nickie is 30w pregnant    She reports inncreasing amounts of vaginal discharge today that she is unsure if it's amniotic fluid.    She has also been having severe back pain that radiates into her pelvis, as well as down her right leg.  The pain is continuous but is greatly increased with activity.    Last night, she rated the pain 10/10 with activity.  She spoke with Dr. Ni last night.    She had a virtual visit today with a PA-C from her PCP clinic.  She also had a chiropractor visit today.  Her back pain is slightly improved after her chiropractor visit but still severe and rated 8/10 with activity.    Per protocols for Vaginal discharge in pregnancy and Back pain in pregnancy, L&D advised.    7:50 pm - Spoke to charge nurse, Winter MOORE, at [facility]. Notified of patient's reports of back pain and increased vaginal discharge and Triage protocol advice for L&D. Pt Name,  & MRN provided.  Pt to wear a mask if available, enter through main hospital entrance, and once support person allowed.    Notified Nickie to proceed to L&D and instructions as noted above.    COVID 19 Nurse Triage Plan/Patient Instructions    Please be aware that novel coronavirus (COVID-19) may be circulating in the community. If you develop symptoms such as fever, cough, or SOB or if you have concerns about the presence of another infection including coronavirus (COVID-19), please contact your health care provider or visit [email or web address].     Disposition/Instructions    Patient to go to ED and follow protocol based instructions.     Bring Your Own Device:  Please also bring your smart device(s) (smart phones, tablets, laptops) and their charging cables for your personal use and to communicate with your care team during your visit.    Thank you for taking steps to prevent the spread of this virus.  o Limit your contact with others.  o Wear a simple mask to cover your cough.  o Wash your hands well and often.    Resources    [facility]  [place]: About COVID-19: [email or web address]    CDC: What to Do If You're Sick: www.cdc.gov/coronavirus/2019-ncov/about/steps-when-sick.html    CDC: Ending Home Isolation: www.cdc.gov/coronavirus/2019-ncov/hcp/disposition-in-home-patients.html     CDC: Caring for Someone: www.cdc.gov/coronavirus/2019-ncov/if-you-are-sick/care-for-someone.html     [facility]: Interim Guidance for Hospital Discharge to Home: www.[facility].[place]./diseases/coronavirus/hcp/hospdischarge.pdf    [facility] clinical trials (COVID-19 research studies): clinicalaffairs.[facility]-clinical-trials     Below are the COVID-19 hotlines at the [place] Department of [facility] ([facility]). Interpreters are available.   o For health questions: Call 013-032-7109 or 1-498.496.9054 (7 a.m. to 7 p.m.)  o For questions about schools and childcare: Call 450-110-7919 or 1-402.976.1590 (7 a.m. to 7 p.m.)     Claudia Dupont RN  [facility] Nurse Advisors      Reason for Disposition    [1] Intermittent lower abdominal pain AND [2] present > 24 hours    Additional Information    Negative: [1] Pregnant 23 or more weeks AND [2] baby is moving less today (e.g., kick count < 5 in 1 hour or < 10 in 2 hours)    Negative: Patient sounds very sick or weak to the triager    Negative: [1] Constant abdominal pain AND [2] present > 2 hours    Negative: Shock suspected (e.g., cold/pale/clammy skin, too weak to stand, low BP, rapid pulse)    Negative: SEVERE abdominal pain    Negative: Sounds like a life-threatening emergency to the triager    Negative: [1] Unable to urinate (or only a few drops) > 4 hours AND     [2] bladder feels very full (e.g., palpable bladder or strong urge to urinate)    Negative: Numbness in groin or rectal area (i.e., loss of sensation)    Negative: Leakage of fluid from vagina    Negative: [1] Pregnant 23 or more weeks AND [2] baby is moving less today (e.g., kick count < 5 in 1 hour or < 10 in 2 hours)    Negative:  Weakness of a leg or foot (e.g., unable to bear weight, dragging foot)    Negative: Unable to walk    Negative: Patient sounds very sick or weak to the triager    [1] SEVERE back pain (e.g., excruciating, unable to do any normal activities) AND [2] not improved 2 hours after pain medicine    Protocols used: PREGNANCY - VAGINAL LIGEPJDEW-W-CT, PREGNANCY - BACK PAIN-A-AH

## 2020-06-19 ENCOUNTER — PRENATAL OFFICE VISIT (OUTPATIENT)
Dept: OBGYN | Facility: CLINIC | Age: 40
End: 2020-06-19
Payer: COMMERCIAL

## 2020-06-19 VITALS
HEART RATE: 88 BPM | SYSTOLIC BLOOD PRESSURE: 120 MMHG | BODY MASS INDEX: 34.07 KG/M2 | WEIGHT: 227.4 LBS | DIASTOLIC BLOOD PRESSURE: 71 MMHG

## 2020-06-19 DIAGNOSIS — O09.529 ANTEPARTUM MULTIGRAVIDA OF ADVANCED MATERNAL AGE: Primary | ICD-10-CM

## 2020-06-19 DIAGNOSIS — E03.9 HYPOTHYROIDISM, UNSPECIFIED TYPE: ICD-10-CM

## 2020-06-19 PROCEDURE — 99207 ZZC PRENATAL VISIT: CPT | Performed by: OBSTETRICS & GYNECOLOGY

## 2020-06-19 NOTE — PATIENT INSTRUCTIONS
If you have any questions regarding your visit, Please contact your care team.  [employer] [facility]: [phone]  [facility] CLINIC HOURS TELEPHONE NUMBER   Cephas Agbeh, M.D.      Anibal López-  Monica-         Monday-[place]    8:00a.m-4:45 p.m    Tuesday--[place]     8:00a.m-4:45 p.m.    Thursday-[place]    8:00a.m-4:45 p.m.    Friday-[place]    8:00a.m-4:45 p.m    [facility]   [address] [place] [address]   [phone]-Ask for [facility]   Fax [phone]   Ddlaxeq-[phone]     [facility]   [address] [place] [address] [place] [address]   [phone]    [facility]  [address] [address]  [phone]  Tqpwwod-[phone]   Urgent Care locations:    [facility] Monday-Friday  5 pm - 9 pm  Saturday and Sunday   9 am - 5 pm   Monday-Friday   5 pm - 9 pm  Saturday and Sunday  9 am - 5 pm    [phone]   If you need a medication refill, please contact your pharmacy. Please allow 3 business days for your refill to be completed.  As always, Thank you for trusting us with your healthcare needs!

## 2020-06-25 ENCOUNTER — TELEPHONE (OUTPATIENT)
Dept: OBGYN | Facility: CLINIC | Age: 40
End: 2020-06-25

## 2020-07-02 ENCOUNTER — PRENATAL OFFICE VISIT (OUTPATIENT)
Dept: OBGYN | Facility: CLINIC | Age: 40
End: 2020-07-02
Payer: COMMERCIAL

## 2020-07-02 VITALS
SYSTOLIC BLOOD PRESSURE: 118 MMHG | BODY MASS INDEX: 34.31 KG/M2 | WEIGHT: 229 LBS | OXYGEN SATURATION: 97 % | HEART RATE: 87 BPM | DIASTOLIC BLOOD PRESSURE: 75 MMHG

## 2020-07-02 DIAGNOSIS — Z34.80 SUPERVISION OF OTHER NORMAL PREGNANCY, ANTEPARTUM: ICD-10-CM

## 2020-07-02 DIAGNOSIS — O09.529 ANTEPARTUM MULTIGRAVIDA OF ADVANCED MATERNAL AGE: Primary | ICD-10-CM

## 2020-07-02 DIAGNOSIS — E03.9 HYPOTHYROIDISM, UNSPECIFIED TYPE: ICD-10-CM

## 2020-07-02 PROCEDURE — 99207 ZZC PRENATAL VISIT: CPT | Performed by: OBSTETRICS & GYNECOLOGY

## 2020-07-02 NOTE — PROGRESS NOTES
33w0d    Tired.  No HA, visual changes, N/V  She has stopped smoking and her blood pressures have been better.   Good FM  No ROM, no vaginal bleeding  She has MFM visit next week 07/10  RTC 2 wk  Calvin Sun MD

## 2020-07-14 ENCOUNTER — PRENATAL OFFICE VISIT (OUTPATIENT)
Dept: OBGYN | Facility: CLINIC | Age: 40
End: 2020-07-14
Payer: COMMERCIAL

## 2020-07-14 VITALS
DIASTOLIC BLOOD PRESSURE: 72 MMHG | WEIGHT: 230 LBS | BODY MASS INDEX: 34.46 KG/M2 | SYSTOLIC BLOOD PRESSURE: 112 MMHG | HEART RATE: 88 BPM

## 2020-07-14 DIAGNOSIS — K21.00 GASTROESOPHAGEAL REFLUX DISEASE WITH ESOPHAGITIS: ICD-10-CM

## 2020-07-14 DIAGNOSIS — E03.9 HYPOTHYROIDISM, UNSPECIFIED TYPE: ICD-10-CM

## 2020-07-14 DIAGNOSIS — Z34.80 SUPERVISION OF OTHER NORMAL PREGNANCY, ANTEPARTUM: ICD-10-CM

## 2020-07-14 DIAGNOSIS — O09.529 ANTEPARTUM MULTIGRAVIDA OF ADVANCED MATERNAL AGE: Primary | ICD-10-CM

## 2020-07-14 PROCEDURE — 99207 ZZC PRENATAL VISIT: CPT | Performed by: OBSTETRICS & GYNECOLOGY

## 2020-07-14 RX ORDER — SUCRALFATE 1 G/1
1 TABLET ORAL 4 TIMES DAILY
COMMUNITY
End: 2020-09-16

## 2020-07-14 NOTE — PROGRESS NOTES
34w5d  Seen in ED 3 days and treated for GERD.Had GI cocktail Now on Prilosec and carafat.She quit her baby aspirin due to concern fr possible ulcer. [facility] visit as bellow. RTC 2 wk/prn.    Impression  =========    1) Smith intrauterine pregnancy at 34 weeks 1 day by LMP c/w 7 week 5 day US.  2) None of the anomalies commonly detected by ultrasound were evident in the limited fetal anatomic survey as described above, anatomy limited by gestational age and  fetal lie.  3) Growth parameters and estimated fetal weight were consistent with established dates.  4) The amniotic fluid volume appeared normal.  5) Normal fetal activity for gestational age.    Recommendation  ==============    Thank-you for referring your patient to assess fetal growth due to chronic hypertension -she has not required antihypertensives and was on low dose aspirin but stopped  recently due to acid reflux. I discussed the findings on today's ultrasound with the patient in person at the time of her visit.    She has just started prilosec but remains very concerned about her acid reflux discomfort. Just started today. Encouraged to try for at least a full week before expecting  results. Encouraged to speak with her primary care team if worsening.    Based on maternal age 40+, she needs weekly BPPs at 36 weeks and this was scheduled here. We will also repeat growth in 4 weeks if undelivered, given slight  acceleration in growth at this time.    If you have questions regarding today's evaluation or if we can be of further service, please contact the [facility].    **Fetal anomalies may be present but not detected**    REPORT SIGNED BY: JOLIE GRAF MD      ICD-10-CM    1. Antepartum multigravida of advanced maternal age  O09.529    2. Supervision of other normal pregnancy, antepartum  Z34.80    3. Hypothyroidism, unspecified type  E03.9    4. Gastroesophageal reflux disease with esophagitis  K21.0      CEPHAS AGBEH, MD.

## 2020-07-14 NOTE — PATIENT INSTRUCTIONS
If you have any questions regarding your visit, Please contact your care team.  [employer]: [phone]  [facility] CLINIC HOURS TELEPHONE NUMBER   Cephas Agbeh, M.D.      Anibal López-  Monica-         Monday-[place]    8:00a.m-4:45 p.m    Tuesday--[place]     8:00a.m-4:45 p.m.    Thursday-[place]    8:00a.m-4:45 p.m.    Friday-[place]    8:00a.m-4:45 p.m    [facility]   [address] [place] [address]   [phone]-Ask for [facility]   Fax [phone]   Xmfxyks-[phone]     [facility] Labor and Delivery   [address] [place] [address] [place] [address]   [phone]    [facility]  [address] [facility] [address]  [phone]  Wnmanro-[phone]   Urgent Care locations:    [facility] Monday-Friday  5 pm - 9 pm  Saturday and Sunday   9 am - 5 pm   Monday-Friday   5 pm - 9 pm  Saturday and Sunday  9 am - 5 pm    [phone]   If you need a medication refill, please contact your pharmacy. Please allow 3 business days for your refill to be completed.  As always, Thank you for trusting us with your healthcare needs!

## 2020-07-22 ENCOUNTER — PRENATAL OFFICE VISIT (OUTPATIENT)
Dept: OBGYN | Facility: CLINIC | Age: 40
End: 2020-07-22
Payer: COMMERCIAL

## 2020-07-22 VITALS
HEART RATE: 93 BPM | TEMPERATURE: 96.7 F | HEIGHT: 68 IN | DIASTOLIC BLOOD PRESSURE: 86 MMHG | OXYGEN SATURATION: 99 % | WEIGHT: 231 LBS | BODY MASS INDEX: 35.01 KG/M2 | SYSTOLIC BLOOD PRESSURE: 124 MMHG

## 2020-07-22 DIAGNOSIS — Z36.89 DETERMINE FETAL PRESENTATION USING ULTRASOUND: Primary | ICD-10-CM

## 2020-07-22 DIAGNOSIS — Z34.80 SUPERVISION OF OTHER NORMAL PREGNANCY, ANTEPARTUM: ICD-10-CM

## 2020-07-22 PROCEDURE — 99207 ZZC PRENATAL VISIT: CPT | Performed by: OBSTETRICS & GYNECOLOGY

## 2020-07-22 PROCEDURE — 87653 STREP B DNA AMP PROBE: CPT | Performed by: OBSTETRICS & GYNECOLOGY

## 2020-07-22 ASSESSMENT — PAIN SCALES - GENERAL: PAINLEVEL: NO PAIN (0)

## 2020-07-22 ASSESSMENT — MIFFLIN-ST. JEOR: SCORE: 1766.31

## 2020-07-22 NOTE — PROGRESS NOTES
She reports feeling reassuring daily fetal activity and will continue to record.  She gained 1 lb since her last visit and denies any fluid leakage or regular uterine contractions.  Her GBS culture was obtained and submitted.  She denies any PCN allergy.  A table-side OB US was performed and verified a vertex presentation with normal AFV and reassuring fetal activity.  Her cervix remains unchanged and unfavorable - see above.  She is to keep her appt for a BPP with MFM tomorrow due to AMA status.

## 2020-07-23 LAB
GP B STREP DNA SPEC QL NAA+PROBE: NEGATIVE
SPECIMEN SOURCE: NORMAL

## 2020-07-28 ENCOUNTER — TELEPHONE (OUTPATIENT)
Dept: OBGYN | Facility: CLINIC | Age: 40
End: 2020-07-28

## 2020-07-28 NOTE — TELEPHONE ENCOUNTER
Forms filled out, signed and faxed to number provided on form.    Arlet West MA on 7/28/2020 at 2:47 PM

## 2020-07-29 ENCOUNTER — PRENATAL OFFICE VISIT (OUTPATIENT)
Dept: OBGYN | Facility: CLINIC | Age: 40
End: 2020-07-29
Payer: COMMERCIAL

## 2020-07-29 VITALS
SYSTOLIC BLOOD PRESSURE: 127 MMHG | BODY MASS INDEX: 34.42 KG/M2 | HEART RATE: 88 BPM | DIASTOLIC BLOOD PRESSURE: 74 MMHG | TEMPERATURE: 97.4 F | WEIGHT: 226.38 LBS

## 2020-07-29 DIAGNOSIS — E03.8 OTHER SPECIFIED HYPOTHYROIDISM: ICD-10-CM

## 2020-07-29 DIAGNOSIS — D50.8 IRON DEFICIENCY ANEMIA SECONDARY TO INADEQUATE DIETARY IRON INTAKE: Primary | ICD-10-CM

## 2020-07-29 DIAGNOSIS — O09.529 ANTEPARTUM MULTIGRAVIDA OF ADVANCED MATERNAL AGE: ICD-10-CM

## 2020-07-29 LAB
FERRITIN SERPL-MCNC: 10 NG/ML (ref 12–150)
HGB BLD-MCNC: 10.3 G/DL (ref 11.7–15.7)
TSH SERPL DL<=0.005 MIU/L-ACNC: 1.92 MU/L (ref 0.4–4)

## 2020-07-29 PROCEDURE — 00000218 ZZHCL STATISTIC OBHBG - HEMOGLOBIN: Performed by: ADVANCED PRACTICE MIDWIFE

## 2020-07-29 PROCEDURE — 99207 ZZC PRENATAL VISIT: CPT | Performed by: ADVANCED PRACTICE MIDWIFE

## 2020-07-29 PROCEDURE — 82728 ASSAY OF FERRITIN: CPT | Performed by: ADVANCED PRACTICE MIDWIFE

## 2020-07-29 PROCEDURE — 36415 COLL VENOUS BLD VENIPUNCTURE: CPT | Performed by: ADVANCED PRACTICE MIDWIFE

## 2020-07-29 PROCEDURE — 84443 ASSAY THYROID STIM HORMONE: CPT | Performed by: ADVANCED PRACTICE MIDWIFE

## 2020-07-29 RX ORDER — BREAST PUMP
1 EACH MISCELLANEOUS PRN
Qty: 1 EACH | Refills: 0 | Status: SHIPPED | OUTPATIENT
Start: 2020-07-29 | End: 2021-01-14

## 2020-07-29 NOTE — PROGRESS NOTES
Feeling well.  No complaints.   No contra/lof/vb  Would like to avoid induction but realizes that may not be possible.   Continuing at [facility] with BPPs  No explanation for weight loss.    Will circ.  Will breast feed but worried about supply issues that she has had in the past   Uncertain about BC.  Would like her  to have a vasectomy but not hopeful it will happen.   So considering IUD.   Discussed peds.   Will check TSH ferritin and hgb today.  Has not been taking iron because it is so constipating for her  rtc 1 w   Mica Polo, APRN, CNM

## 2020-07-30 PROBLEM — D64.9 ANEMIA: Status: ACTIVE | Noted: 2020-07-30

## 2020-08-03 ENCOUNTER — TELEPHONE (OUTPATIENT)
Dept: OBGYN | Facility: CLINIC | Age: 40
End: 2020-08-03

## 2020-08-03 DIAGNOSIS — Z34.80 SUPERVISION OF OTHER NORMAL PREGNANCY, ANTEPARTUM: Primary | ICD-10-CM

## 2020-08-03 NOTE — TELEPHONE ENCOUNTER
[facility] Call Center    Phone Message    May a detailed message be left on voicemail: yes     Reason for Call: Order(s): Other:   Reason for requested: order for hands free breast pump  Date needed: asap  Provider name: Agbeh    Please call patient when the order is ready    Action Taken: Message routed to:  [facility] p [phone]    Travel Screening: Not Applicable

## 2020-08-04 NOTE — TELEPHONE ENCOUNTER
Agbeh, Cephas Mawuena, MD  You 14 hours ago (6:01 PM)         Prescription sent.   CEPHAS AGBEH, MD.        Routing comment      RN called and left message stating prescription has been sent.    Radha Reynoso RN on 8/4/2020 at 8:43 AM

## 2020-08-05 NOTE — TELEPHONE ENCOUNTER
Nery from [employer] called and state order for breast pump is needed. Please fax to [phone]. Please call Nery at [phone] for any questions.

## 2020-08-07 ENCOUNTER — PRENATAL OFFICE VISIT (OUTPATIENT)
Dept: OBGYN | Facility: CLINIC | Age: 40
End: 2020-08-07
Payer: COMMERCIAL

## 2020-08-07 VITALS
DIASTOLIC BLOOD PRESSURE: 71 MMHG | BODY MASS INDEX: 35.17 KG/M2 | SYSTOLIC BLOOD PRESSURE: 117 MMHG | HEART RATE: 87 BPM | WEIGHT: 231.3 LBS

## 2020-08-07 DIAGNOSIS — D50.8 IRON DEFICIENCY ANEMIA SECONDARY TO INADEQUATE DIETARY IRON INTAKE: ICD-10-CM

## 2020-08-07 DIAGNOSIS — O10.919 CHRONIC HYPERTENSION AFFECTING PREGNANCY: Primary | ICD-10-CM

## 2020-08-07 DIAGNOSIS — Z34.80 SUPERVISION OF OTHER NORMAL PREGNANCY, ANTEPARTUM: ICD-10-CM

## 2020-08-07 DIAGNOSIS — O09.529 ANTEPARTUM MULTIGRAVIDA OF ADVANCED MATERNAL AGE: ICD-10-CM

## 2020-08-07 PROCEDURE — 99207 ZZC PRENATAL VISIT: CPT | Performed by: OBSTETRICS & GYNECOLOGY

## 2020-08-07 RX ORDER — FERROUS SULFATE 325(65) MG
325 TABLET ORAL
COMMUNITY
End: 2020-09-24

## 2020-08-07 NOTE — PROGRESS NOTES
38w1d  No leakage of fluid.  Denies HA, visual changes etc.  Discussed labor plan as well as cervical ripening/induction per [facility].  Scheduled for ripening on 8/12/20. Reviewed when to call.   [facility] BPP today    ICD-10-CM    1. Chronic hypertension affecting pregnancy  O10.919    2. Supervision of other normal pregnancy, antepartum  Z34.80    3. Iron deficiency anemia secondary to inadequate dietary iron intake  D50.8    4. Antepartum multigravida of advanced maternal age  O09.529      CEPHAS AGBEH, MD.

## 2020-08-07 NOTE — PATIENT INSTRUCTIONS
If you have any questions regarding your visit, Please contact your care team.  [employer] [facility]: [phone]  [facility] CLINIC HOURS TELEPHONE NUMBER   Cephas Agbeh, M.D.      Anibal López-  Monica-         Monday-[place]    8:00a.m-4:45 p.m    Tuesday--[place]     8:00a.m-4:45 p.m.    Thursday-[place]    8:00a.m-4:45 p.m.    Friday-[place]    8:00a.m-4:45 p.m    [facility]   [address] [place] [address]   [phone]-Ask for [facility]   Fax [phone]   Cabpxlq-[phone]     [facility] Labor and Delivery   [address] [place] [address] [place] [address]   [phone]    [facility]  [address] [facility] [address]  [phone]  Ppuyyhn-[phone]   Urgent Care locations:    [facility] Monday-Friday  5 pm - 9 pm  Saturday and Sunday   9 am - 5 pm   Monday-Friday   5 pm - 9 pm  Saturday and Sunday  9 am - 5 pm    [phone]   If you need a medication refill, please contact your pharmacy. Please allow 3 business days for your refill to be completed.  As always, Thank you for trusting us with your healthcare needs!

## 2020-08-24 DIAGNOSIS — R52 PAIN: Primary | ICD-10-CM

## 2020-08-24 RX ORDER — IBUPROFEN 600 MG/1
600 TABLET, FILM COATED ORAL EVERY 6 HOURS PRN
COMMUNITY
Start: 2020-08-17 | End: 2020-08-24

## 2020-08-24 NOTE — TELEPHONE ENCOUNTER
"Requested Prescriptions   Pending Prescriptions Disp Refills     ibuprofen (ADVIL/MOTRIN) 600 MG tablet 30 tablet 0     Sig: Take 1 tablet (600 mg) by mouth every 6 hours as needed (for mild to moderate pain)       NSAID Medications Failed - 8/24/2020  1:04 PM        Failed - Normal ALT on file in past 12 months     No lab results found.          Failed - Normal AST on file in past 12 months     No lab results found.          Failed - Normal CBC on file in past 12 months     Recent Labs   Lab Test 07/29/20  1446  01/09/20  1600   WBC  --   --  10.0   RBC  --   --  3.94   HGB 10.3*   < > 13.0   HCT  --   --  38.6   PLT  --   --  230    < > = values in this interval not displayed.                 Failed - No positive pregnancy test in past 12 months        Passed - Blood pressure under 140/90 in past 12 months     BP Readings from Last 3 Encounters:   08/07/20 117/71   07/29/20 127/74   07/22/20 124/86                 Passed - Recent (12 mo) or future (30 days) visit within the authorizing provider's specialty     Patient has had an office visit with the authorizing provider or a provider within the authorizing providers department within the previous 12 mos or has a future within next 30 days. See \"Patient Info\" tab in inbasket, or \"Choose Columns\" in Meds & Orders section of the refill encounter.              Passed - Patient is age 6-64 years        Passed - Medication is active on med list        Passed - No active pregnancy on record        Passed - Normal serum creatinine on file in past 12 months     Recent Labs   Lab Test 01/09/20  1600   CR 0.64       Ok to refill medication if creatinine is low           Signed Prescriptions Disp Refills    ibuprofen (ADVIL/MOTRIN) 600 MG tablet       Sig: Take 600 mg by mouth every 6 hours as needed       There is no refill protocol information for this order        Routing refill request to provider for review/approval because:  Labs not current:  ALT, AST, CBC  Medication " is reported/historical    Danielle Zamudio RN

## 2020-08-25 RX ORDER — IBUPROFEN 600 MG/1
600 TABLET, FILM COATED ORAL EVERY 6 HOURS PRN
Qty: 30 TABLET | Refills: 0 | Status: SHIPPED | OUTPATIENT
Start: 2020-08-25

## 2020-08-26 ENCOUNTER — NURSE TRIAGE (OUTPATIENT)
Dept: NURSING | Facility: CLINIC | Age: 40
End: 2020-08-26

## 2020-08-26 ENCOUNTER — TELEPHONE (OUTPATIENT)
Dept: OBGYN | Facility: CLINIC | Age: 40
End: 2020-08-26

## 2020-08-26 NOTE — TELEPHONE ENCOUNTER
[facility] Call Center    Phone Message    May a detailed message be left on voicemail: yes     Reason for Call: Symptoms or Concerns     If patient has red-flag symptoms, warm transfer to triage line    Current symptom or concern: bright red blood, pt had c section 1 week ago, pt was seen at [facility]    Symptoms have been present for:  1 day(s)    Has patient previously been seen for this? No        Are there any new or worsening symptoms? Yes: New      Action Taken: Message routed to:  [facility] p [id number]

## 2020-08-26 NOTE — TELEPHONE ENCOUNTER
"Pt had a  on 8/15/2020.    Spoke with pt.  She states since her  she has only noticed brown lochia.  However, this morning when she woke up she felt a big gush of bright red blood.  She did end up going to the ED.  They did an US that showed no retained product and advised she f/u with Dr. Fernandez.    Pt states she continues to have bright red bleeding but she is not soaking through a regular sized pad in less than an hour.  She states it is like a normal period.  She denies cramping but does have some pain.  She attributes this increase in pain to being more active the last couple days.  She states the pain is \"not bad\".  Pt states she did have a \"low grade fever\" but has recently started on abx for possible mastitis so no elevated temp.    I suggested that the increase in bleeding could be from pt being more active lately.  I advised that she continue to monitor and let us know if bleeding increases to where she is soaking through a pad in less than an hour or her pain becomes unbearable since she was just evaluated at the ED and it was determined that there are no retained products.  I will also route to Dr. Fernandez for further recommendations.    Pt verbalized understanding and agreed to plan.    Danielle Zamudio RN    "

## 2020-08-27 ENCOUNTER — PRENATAL OFFICE VISIT (OUTPATIENT)
Dept: OBGYN | Facility: CLINIC | Age: 40
End: 2020-08-27
Payer: COMMERCIAL

## 2020-08-27 VITALS — WEIGHT: 221.4 LBS | BODY MASS INDEX: 33.66 KG/M2

## 2020-08-27 PROCEDURE — 99024 POSTOP FOLLOW-UP VISIT: CPT | Performed by: OBSTETRICS & GYNECOLOGY

## 2020-08-27 NOTE — PROGRESS NOTES
Nickie is a 40 year old   is here today in follow up from the ED.  She is approx 2 weeks S/P  delivery with delayed bleeding.  Work up in the ED was negative for retained POC.  She reports the bleeding is resolving..     No urinary frequency or dysuria, bladder or kidney problems    ROS: Ten point review of systems was reviewed and negative except the above.    Gyn Hx:      Past Medical History:   Diagnosis Date     Anxiety disorder      Class 1 obesity due to excess calories with serious comorbidity and body mass index (BMI) of 30.0 to 30.9 in adult 2019     Discogenic low back pain 2019     Essential hypertension 2019    borderline     History of abnormal cervical Pap smear 2017    18:  Pap NIL/HPV neg 2017: Pap LSIL HPV + (not 18)     Hypothyroid      Kidney stone      Multiple sclerosis (H) 2018    MS, relapsing, remitting:  Neurology.  Presented with optic neuritis.      Past Surgical History:   Procedure Laterality Date     HC COLP CERVIX/UPPER VAGINA       HC DILATION/CURETTAGE DIAG/THER NON OB      benign polyp     HC INSERTION INTRAUTERINE DEVICE       HC REMOVE INTRAUTERINE DEVICE       HC TOOTH EXTRACTION W/FORCEP       PE TUBES       TONSILLECTOMY & ADENOIDECTOMY       Patient Active Problem List   Diagnosis     CARDIOVASCULAR SCREENING; LDL GOAL LESS THAN 160     Hashimotos thyroiditis     Vitamin D deficiency     Anxiety disorder     Multiple sclerosis (H)     History of abnormal cervical Pap smear     Essential hypertension     Supervision of other normal pregnancy, antepartum     Antepartum multigravida of advanced maternal age     Discogenic low back pain     Kidney stone     Class 1 obesity due to excess calories with serious comorbidity and body mass index (BMI) of 30.0 to 30.9 in adult     Anemia     Delayed postpartum hemorrhage       ALL/Meds: Her medication and allergy histories were reviewed and are documented in their appropriate  chart areas.    SH: Reviewed and documented in the appropriate area of the chart.  FH:  Her family history is reviewed and updated in the chart, today.  PMH: Her past medical, surgical, and obstetric histories were reviewed and updated today in the appropriate chart areas.    PE: LMP 11/14/2019 (Exact Date)   There is no height or weight on file to calculate BMI.    General Appearance:  healthy, alert, active, no distress  Cardiovascular:  Regular rate and Rhythm  Neck: Supple, no adenopathy and thyroid normal  Lungs:  Clear, without wheeze, rale or rhonchi  Breast: deferred  Abdomen: Benign, Soft, flat, non-tender, No masses, organomegaly, No inguinal nodes, Bowel sounds normoactive and Incision is intact and healing well.   Pelvic: deferred        A/P:.Delayed postpartum hemorrhage.  Resolving spontaneously.  Declines birth control at this time.  Plan routine postpartum check.     -     - No orders of the defined types were placed in this encounter.

## 2020-08-27 NOTE — TELEPHONE ENCOUNTER
"Patient calling reporting she has increased vaginal bleeding with \"pea sized clots\". States she was seen at Emergency department earlier today and rulled out any \"tissue remains\" via ultrasound. Patient states she continues to have the increased vaginal bleeding. States she has intermittent abdominal pain and rates pain at 3/10. States her skin appears to be more pale. Per guideline, informed patient RN will page on call obgyn provider to call patient for second level triage.    Paged on-call provider RON CASILLAS MD, for [facility] at 8:46pm, via Rain to call patient directly at [phone].    Advised patient to call back if has not recieved a call from provider within 20 minutes. Caller verbalized understanding. Denies further questions.      Sheng Styles RN  [facility]       Additional Information    Negative: Shock suspected (e.g., cold/pale/clammy skin, too weak to stand, low BP, rapid pulse)    Negative: Difficult to awaken or acting confused (e.g., disoriented, slurred speech)    Negative: Passed out (i.e., lost consciousness, collapsed and was not responding)    Negative: Sounds like a life-threatening emergency to the triager    Negative: SEVERE dizziness (e.g., unable to stand, requires support to walk, feels like passing out now)    Negative: [1] SEVERE abdominal pain AND [2] present > 1 hour    Negative: Fever > 100.4 F (38.0 C)    Negative: SEVERE vaginal bleeding (i.e., soaking 2 pads or tampons per hour and present 2 or more hours)    Negative: Patient sounds very sick or weak to the triager    Negative: MODERATE vaginal bleeding (i.e., soaking 1 pad or tampon per hour and present > 6 hours)    Negative: [1] Constant abdominal pain AND [2] present > 2 hours    Pale skin (pallor) of new onset or worsening    Protocols used: POSTPARTUM - VAGINAL BLEEDING AND LOCHIA-A-AH      "

## 2020-08-27 NOTE — TELEPHONE ENCOUNTER
Pt called the nurse line last night and they ended up paging Dr. Almeida and Dr. Almeida advised pt be seen today due to bleeding increased.  She is scheduled to see Dr. Almeida today.    Danielle Zamudio RN

## 2020-08-27 NOTE — TELEPHONE ENCOUNTER
I agree, patient can continue to monitor signs and symptoms however if the vaginal bleeding increases she should be seen in the ED or the clinic.    Sofia Fernandez, DO

## 2020-09-14 ENCOUNTER — TELEPHONE (OUTPATIENT)
Dept: OBGYN | Facility: CLINIC | Age: 40
End: 2020-09-14

## 2020-09-14 ENCOUNTER — OFFICE VISIT (OUTPATIENT)
Dept: OBGYN | Facility: CLINIC | Age: 40
End: 2020-09-14
Payer: COMMERCIAL

## 2020-09-14 VITALS
HEART RATE: 91 BPM | OXYGEN SATURATION: 98 % | SYSTOLIC BLOOD PRESSURE: 156 MMHG | BODY MASS INDEX: 32.08 KG/M2 | DIASTOLIC BLOOD PRESSURE: 95 MMHG | WEIGHT: 211 LBS

## 2020-09-14 DIAGNOSIS — L03.311 CELLULITIS OF ABDOMINAL WALL: Primary | ICD-10-CM

## 2020-09-14 PROCEDURE — 99024 POSTOP FOLLOW-UP VISIT: CPT | Performed by: OBSTETRICS & GYNECOLOGY

## 2020-09-14 RX ORDER — CEPHALEXIN 500 MG/1
500 CAPSULE ORAL 4 TIMES DAILY
Qty: 40 CAPSULE | Refills: 0 | Status: SHIPPED | OUTPATIENT
Start: 2020-09-14 | End: 2020-09-24

## 2020-09-14 NOTE — PROGRESS NOTES
OB/GYN      NAME:  Nickie Garcia  PCP:  Winter Perdomo  MRN:  [id number]    Impression / Plan     40 year old  with:      ICD-10-CM    1. Cellulitis of abdominal wall  L03.311 cephALEXin (KEFLEX) 500 MG capsule       Exam findings reviewed.  Possible cellulitis on the right lateral aspect of the incision, which may be due to the patient's recent removal of an exposed stitch.  We will start with Keflex.  She will let us know if her symptoms worsen or do not improve.  She will follow-up for her 6-week postpartum visit in 2 weeks.  She requests IUD insertion at the time of her postpartum visit.  She plans to monitor her blood pressures at home.  She will let me know if they are elevated.    Chief Complaint     Chief Complaint   Patient presents with     Follow Up     Incision check       HPI     Nickie Garcia is a  40 year old female who is seen for incision concerns.    Patient had a  section on 8/15/2020.  Patient recently had a stitch that was sticking out on the right lateral aspect of her incision and she had her friend cut it out.  The area had some purulent drainage this morning.  This afternoon it is now more serosanguineous.  It is somewhat painful to touch.  There is some erythema around the area.  No fevers or chills.  No other concerns today.        Problem List     Patient Active Problem List    Diagnosis Date Noted     Delayed postpartum hemorrhage 2020     Priority: Medium     Anemia 2020     Priority: Medium     Supervision of other normal pregnancy, antepartum 01/10/2020     Priority: Medium     Boy.       Antepartum multigravida of advanced maternal age 01/10/2020     Priority: Medium     Innatal neg.          Discogenic low back pain 01/10/2020     Priority: Medium     Class 1 obesity due to excess calories with serious comorbidity and body mass index (BMI) of 30.0 to 30.9 in adult 01/10/2020     Priority: Medium     Essential hypertension  09/05/2019     Priority: Medium     Kidney stone 01/01/2019     Priority: Medium     Multiple sclerosis (H) 08/30/2018     Priority: Medium     MS, relapsing, remitting:  Follows with neurology with Dr. Franco and the [facility].  Patient was diagnosed in January of 2018.  She states since starting Copaxone she is back to her norm.         History of abnormal cervical Pap smear 08/30/2018     Priority: Medium     4/13/18:  Pap NIL/HPV neg  2/2017: Pap LSIL HPV + (not 16/18)       Anxiety disorder 10/25/2012     Priority: Medium     Hashimotos thyroiditis 05/30/2012     Priority: Medium     Vitamin D deficiency 05/30/2012     Priority: Medium     CARDIOVASCULAR SCREENING; LDL GOAL LESS THAN 160 10/31/2010     Priority: Low       Medications     Current Outpatient Medications   Medication     cephALEXin (KEFLEX) 500 MG capsule     ibuprofen (ADVIL/MOTRIN) 600 MG tablet     levothyroxine (SYNTHROID/LEVOTHROID) 175 MCG tablet     ferrous sulfate (FEROSUL) 325 (65 Fe) MG tablet     Misc. Devices (BREAST PUMP) MISC     omeprazole (PRILOSEC) 20 MG DR capsule     Prenatal Vit-Fe Fumarate-FA (PNV PRENATAL PLUS MULTIVITAMIN) 27-1 MG TABS per tablet     sucralfate (CARAFATE) 1 GM tablet     No current facility-administered medications for this visit.         Allergies     Allergies   Allergen Reactions     Wellbutrin [Bupropion] Other (See Comments)     Optic neuritis       ROS     A skin,  organ review of systems was asked and the pertinent positives and negatives are listed in the HPI.     Physical Exam   Vitals: BP (!) 156/95 (BP Location: Right arm, Cuff Size: Adult Regular)   Pulse 91   Wt 95.7 kg (211 lb)   LMP 11/14/2019 (Exact Date)   SpO2 98%   Breastfeeding No   BMI 32.08 kg/m      General: Comfortable, no obvious distress, normal  body habitus  Abdomen: Soft, non tender, non distended.  Incision with mild erythema on the right lateral aspect.  No drainage.  Intact aside from a pinpoint area at the  corner.  Mild fullness less than 1 cm around the lateral corner.      10 minutes was spent with patient, more than 50% counseling and coordinating care as noted above in the A/P    Nursing notes read and reviewed    Arlet Diop MD

## 2020-09-14 NOTE — TELEPHONE ENCOUNTER
Pt had a  on 8/15.  She states she had a stitch that was sticking out and cut it close to her skin.  She states that area is now open with drainage coming from it.  She reports the drainage as purulent with some blood.  Her skin is red around the open area.  Area is painful.     Pt denies warmth at the site or fever.    I advised that pt have it further evaluated today to rule out infection.  Pt states she won't be able to travel too far today as she is busy with distance learning with her other children.  Dr. Fernandez (surgeon) is not available today and does not have any availability the rest of the week.    Will route to the providers at the  clinic today to see if they are able to work pt into their schedule today to look at her incision.    Danielle Zamudio RN

## 2020-09-15 DIAGNOSIS — K21.00 GASTROESOPHAGEAL REFLUX DISEASE WITH ESOPHAGITIS: Primary | ICD-10-CM

## 2020-09-15 NOTE — LETTER
Nickie Garcia  [address]   [place] [facility] [address]          09/16/20      Dear Nickie Garcia        At [facility] we care about your health and are committed to providing quality patient care. Regular appointments are a vital part of the care and management of your health and can help prevent many of the complications that can occur.      We have refilled your Carafate.   We will need you to schedule a Virtual visit before any additional refills can be given.  Please call [phone] to schedule this appointment.      Thank you,    [facility]

## 2020-09-16 RX ORDER — SUCRALFATE 1 G/1
1 TABLET ORAL 4 TIMES DAILY
Qty: 120 TABLET | Refills: 0 | Status: SHIPPED | OUTPATIENT
Start: 2020-09-16 | End: 2020-09-24

## 2020-09-16 NOTE — TELEPHONE ENCOUNTER
"Requested Prescriptions   Pending Prescriptions Disp Refills     sucralfate (CARAFATE) 1 GM tablet       Sig: Take 1 tablet (1 g) by mouth 4 times daily       Miscellaneous Gastrointestinal Agents Passed - 9/15/2020  2:10 PM        Passed - Recent (12 mo) or future (30 days) visit within the authorizing provider's specialty     Patient has had an office visit with the authorizing provider or a provider within the authorizing providers department within the previous 12 mos or has a future within next 30 days. See \"Patient Info\" tab in inbasket, or \"Choose Columns\" in Meds & Orders section of the refill encounter.              Passed - Medication is active on med list        Passed - Patient is 18 years of age or older           Failed protcol  "

## 2020-09-17 NOTE — PATIENT INSTRUCTIONS
Patient Education     Tips for Using Less Salt    Most people with heart problems need to eat less salt (sodium). Reducing the amount of salt you eat may help control your blood pressure. The higher your blood pressure, the greater your risk for heart disease, stroke, blindness, and kidney problems.  At the store    Make low-salt choices by reading labels carefully. Look for the total amount of sodium per serving.    Use more fresh food. Buy more fruits and vegetables. Select lean meats, fish, and poultry.    Use fewer frozen, canned, and packaged foods which often contain a lot of sodium.    Use plain frozen vegetables without sauces or toppings. These products are often low-sodium or no-sodium.    Choose reduced-sodium or no-salt-added versions of canned vegetables and soups.  In the kitchen    Don't add salt to food when you're cooking. Season with flavorings such as onion, garlic, pepper, salt-free herbal blends, and lemon or lime juice.    Use a cookbook containing low-salt recipes. It can give you ideas for tasty meals that are healthy for your heart.    Sprinkle salt-free herbal blends on vegetables and meat.    Drain and rinse canned foods, such as canned beans and vegetables, before cooking or eating.  Eating out    Tell the  you're on a low-salt diet. Ask questions about the menu.    Order fish, chicken, and meat broiled, baked, poached, or grilled without salt, butter, or breading.    Use lemon, pepper, and salt-free herb mixes to add flavor.    Choose plain steamed rice, boiled noodles, and baked or boiled potatoes. Top potatoes with chives and a little sour cream.     Beware! Salt goes by many other names. Limit foods with these words listed as ingredients: salt, sodium, soy sauce, baking soda, baking powder, MSG, monosodium, Na (the chemical symbol for sodium). Some antacids are also high in salt.   Date Last Reviewed: 6/1/2017 2000-2018 [employer]. [address] [facility],  Attempted to reach patient for covid-19 f/u s/p recent ED visit for c/o SOB and cough. COVID-19 testing was completed and results are pending. Patient was not available at the time of my call and generic voicemail message was left asking patient to please return call to my direct number. Will continue to work to f/u with patient in the future. MAR[place] [id number]. All rights reserved. This information is not intended as a substitute for professional medical care. Always follow your healthcare professional's instructions.           Patient Education     Low-Salt Diet  This diet removes foods that are high in salt. It also limits the amount of salt you use when cooking. It is most often used for people with high blood pressure, edema (fluid retention), and kidney, liver, or heart disease.  Table salt contains the mineral sodium. Your body needs sodium to work normally. But too much sodium can make your health problems worse. Your healthcare provider is recommending a low-salt (also called low-sodium) diet for you. Your total daily allowance of salt is 1,500 to 2,300 milligrams (mg). It is less than 1 teaspoon of table salt. This means you can have only about 500 to 700 mg of sodium at each meal. People with certain health problems should limit salt intake to the lower end of the recommended range.    When you cook, don t add much salt. If you can cook without using salt, even better. Don t add salt to your food at the table.  When shopping, read food labels. Salt is often called sodium on the label. Choose foods that are salt-free, low salt, or very low salt. Note that foods with reduced salt may not lower your salt intake enough.    Beans, potatoes, and pasta  Ok: Dry beans, split peas, lentils, potatoes, rice, macaroni, pasta, spaghetti without added salt  Avoid: Potato chips, tortilla chips, and similar products  Breads and cereals  Ok: Low-sodium breads, rolls, cereals, and cakes; low-salt crackers, matzo crackers  Avoid: Salted crackers, pretzels, popcorn, Ecuadorean toast, pancakes, muffins  Dairy  Ok: Milk, chocolate milk, hot chocolate mix, low-salt cheeses, and yogurt  Avoid: Processed cheese and cheese spreads; Roquefort, Camembert, and cottage cheese; buttermilk, instant breakfast drink  Desserts  Ok: Ice cream, frozen yogurt, juice bars, gelatin,  cookies and pies, sugar, honey, jelly, hard candy  Avoid: Most pies, cakes and cookies prepared or processed with salt; instant pudding  Drinks  Ok: Tea, coffee, fizzy (carbonated) drinks, juices  Avoid: Flavored coffees, electrolyte replacement drinks, sports drinks  Meats  Ok: All fresh meat, fish, poultry, low-salt tuna, eggs, egg substitute  Avoid: Smoked, pickled, brine-cured, or salted meats and fish. This includes minor, chipped beef, corned beef, hot dogs, deli meats, ham, kosher meats, salt pork, sausage, canned tuna, salted codfish, smoked salmon, herring, sardines, or anchovies.  Seasonings and spices  Ok: Most seasonings are okay. Good substitutes for salt include: fresh herb blends, hot sauce, lemon, garlic, rivera, vinegar, dry mustard, parsley, cilantro, horseradish, tomato paste, regular margarine, mayonnaise, unsalted butter, cream cheese, vegetable oil, cream, low-salt salad dressing and gravy.  Avoid: Regular ketchup, relishes, pickles, soy sauce, teriyaki sauce, Worcestershire sauce, BBQ sauce, tartar sauce, meat tenderizer, chili sauce, regular gravy, regular salad dressing, salted butter  Soups  Ok: Low-salt soups and broths made with allowed foods  Avoid: Bouillon cubes, soups with smoked or salted meats, regular soup and broth  Vegetables  Ok: Most vegetables are okay; also low-salt tomato and vegetable juices  Avoid: Sauerkraut and other brine-soaked vegetables; pickles and other pickled vegetables; tomato juice, olives  Date Last Reviewed: 8/1/2016 2000-2018 [employer]. [address] [place] [address], [place] [address]. All rights reserved. This information is not intended as a substitute for professional medical care. Always follow your healthcare professional's instructions.           Patient Education     Understanding Smokeless Tobacco   Smokeless tobacco products are made from the tobacco plant. They are harmful to your body. Smokeless tobacco causes oral cancer, esophageal  cancer, and pancreatic cancer. These products are not safer than other forms of tobacco. Tobacco is not safe in any form.   Smokeless tobacco is used by about 7 in 100 [place] adults. Most users are men. About 1 in 10 adolescent boys also use it.   What is smokeless tobacco?  Smokeless tobacco comes in 3 types. These are chewing tobacco, snuff, and dissolvables. Many of these products are flavored to make them more appealing.   Snuff is the most widely used kind of smokeless tobacco. This finely ground tobacco can be dry or moist. The dry powder is sniffed. Moist snuff is put in the mouth between the gums and cheeks. Often packaged in round cans, it s called dip. Snuff also includes snus. This is moist tobacco sold in small pouches.   Chewing tobacco--or chew--is sold as loose leaves, plugs, or twists. A piece is pressed between the gums and cheek. Dissolvables are tobacco that has been made into small shapes, like lozenges or tablets. They are often sucked on until they melt.   How is smokeless tobacco bad for you?  All tobacco products contain dangerous chemicals. Tobacco use is not safe in any amount or in any form.   All forms of tobacco have nicotine in them. Nicotine is very addictive. You can become physically and emotionally hooked on it. You may even crave it. In children, nicotine can harm the developing brain.   Smokeless tobacco also has at least 30 harmful chemicals. The worst are tobacco-specific nitrosamines. These come from the way tobacco is processed. You may also be exposed to other chemicals like lead and mercury. All of these have been linked to cancer. If you use smokeless tobacco, you are more at risk for cancer of the mouth, esophagus, and pancreas.   Smokeless tobacco may also lead to other health problems. These include:    Heart disease and stroke    Small white patches in the mouth that can turn into cancer (leukoplakia)    Gum disease, tooth decay, and tooth loss    Stained teeth and bad  breath    Early birth or miscarriage if used while pregnant    How can you quit smokeless tobacco?  Quitting any form of tobacco can be hard to do. The nicotine can cause physical, mental, and emotional withdrawal symptoms. You may have headaches, depression, and trouble sleeping. You may feel anxious, angry, tired, or restless. You may also feel the need to put something in your mouth to replace the chew, dip, or other smokeless tobacco product.  If you want to quit, talk with your healthcare provider, pharmacist, or dentist. He or she can tell you about all your options. Many of the same tactics people use to quit cigarettes may help you. You may want to try a support group, a tobacco cessation program, medicine, or nicotine replacement therapy. Many resources are also available through your smart phone.      More resources    American Lung Association    CDC    National Cancer [place]   Date Last Reviewed: 5/1/2017 2000-2018 [employer]. [address] [place] [address], [place] [address]. All rights reserved. This information is not intended as a substitute for professional medical care. Always follow your healthcare professional's instructions.

## 2020-09-24 ENCOUNTER — PRENATAL OFFICE VISIT (OUTPATIENT)
Dept: OBGYN | Facility: CLINIC | Age: 40
End: 2020-09-24
Payer: COMMERCIAL

## 2020-09-24 VITALS
BODY MASS INDEX: 31.67 KG/M2 | WEIGHT: 208.3 LBS | DIASTOLIC BLOOD PRESSURE: 92 MMHG | SYSTOLIC BLOOD PRESSURE: 140 MMHG | OXYGEN SATURATION: 99 % | HEART RATE: 104 BPM

## 2020-09-24 DIAGNOSIS — Z30.430 ENCOUNTER FOR IUD INSERTION: ICD-10-CM

## 2020-09-24 PROBLEM — Z34.80 SUPERVISION OF OTHER NORMAL PREGNANCY, ANTEPARTUM: Status: RESOLVED | Noted: 2020-01-10 | Resolved: 2020-09-24

## 2020-09-24 LAB — HCG UR QL: NEGATIVE

## 2020-09-24 PROCEDURE — 81025 URINE PREGNANCY TEST: CPT | Performed by: OBSTETRICS & GYNECOLOGY

## 2020-09-24 PROCEDURE — 58300 INSERT INTRAUTERINE DEVICE: CPT | Performed by: OBSTETRICS & GYNECOLOGY

## 2020-09-24 PROCEDURE — 99207 ZZC POST PARTUM EXAM: CPT | Performed by: OBSTETRICS & GYNECOLOGY

## 2020-09-24 ASSESSMENT — PATIENT HEALTH QUESTIONNAIRE - PHQ9: SUM OF ALL RESPONSES TO PHQ QUESTIONS 1-9: 0

## 2020-09-24 NOTE — PROGRESS NOTES
POSTPARTUM VISIT:    Delivery Information:    Date:  08/15/2020  Route:   section   Sex:  Male     Weight:  3690 g      Apgars:  8/9  Reviewed pregnancy and birth.  Doing well.  No significant symptoms.  Infant doing fine.  Breast feeding:  No (suspected milk allergy)  Bottle:  yes  Formula:  yes    Exam:  BP (!) 140/92 (BP Location: Right arm, Cuff Size: Adult Regular)   Pulse 104   Wt 94.5 kg (208 lb 4.8 oz)   LMP 2019 (Exact Date)   SpO2 99%   Breastfeeding No   BMI 31.67 kg/m    PHQ-9 = 0  General:  WNWD female, NAD  Alert  Oriented x 3  Gait:  Normal  Skin:  Normal skin turgor  HEENT:  NC/AT, EOMI  Abdomen:  Non-tender, non-distended.  Vulva: No external lesions, normal hair distribution, no adenopathy  BUS:  Normal, no masses noted  Vagina: Moist, pink, no abnormal discharge, well rugated, no lesions  Cervix: Smooth, pink, no visible lesions  Uterus: Normal size, anteverted, non-tender, mobile  Ovaries: No mass, non-tender, mobile    Reviewed contraception plans.  We reviewed the options available, the side effects, risks, benefits and instructions on proper use.  She desires the Mirena IUD   Pap smear in mid .   Continue general medical care.      PROCEDURE  I discussed the method, effectiveness, contraindications, risks, benefits, alternatives and the insertion procedure itself.  Patient was an appropriate candidate and desired to proceed.  Consent signed.  Pregnancy test today is negative.   After appropriate preparation, the cervix was grasped with a tenaculum and the uterine cavity sounded to 7 cm.  The Mirena IUD was inserted using the standard and sterile technique.  The strings were trimmed to approximately 2.5 cm.  No apparent complications.  Patient tolerated the procedure well.  The IUD effectiveness is 5 years.  Followup in 1 month for IUD check, yearly for annual exams.  She should alert us to any GYN problems.      IUD DEVICE:  LOT # [id number]   EXP Date 10/2022

## 2020-10-19 ENCOUNTER — MEDICAL CORRESPONDENCE (OUTPATIENT)
Dept: HEALTH INFORMATION MANAGEMENT | Facility: CLINIC | Age: 40
End: 2020-10-19

## 2020-11-04 ENCOUNTER — OFFICE VISIT (OUTPATIENT)
Dept: URGENT CARE | Facility: URGENT CARE | Age: 40
End: 2020-11-04
Payer: COMMERCIAL

## 2020-11-04 VITALS
HEART RATE: 79 BPM | DIASTOLIC BLOOD PRESSURE: 86 MMHG | OXYGEN SATURATION: 99 % | TEMPERATURE: 98.7 F | SYSTOLIC BLOOD PRESSURE: 132 MMHG | BODY MASS INDEX: 31.9 KG/M2 | WEIGHT: 209.8 LBS

## 2020-11-04 DIAGNOSIS — M65.4 DE QUERVAIN'S DISEASE (TENOSYNOVITIS): Primary | ICD-10-CM

## 2020-11-04 PROCEDURE — 99213 OFFICE O/P EST LOW 20 MIN: CPT | Performed by: FAMILY MEDICINE

## 2020-11-04 NOTE — PROGRESS NOTES
SUBJECTIVE:   Nickie Garcia is a 40 year old female presenting with a chief complaint of   Chief Complaint   Patient presents with     Wrist Pain     unknown trauma, limited ROM, onset midday yesterday       She is an established patient of [facility].    Cassie is a 40-year-old female presenting today with right radial sided wrist discomfort distal wrist area along the thumb side and radial side of her forearm.  Over the past 24 hours seems to be worsening some.      She does have a 12-week-old she has been caring for.      Does smoke about a half a pack a days started once again after the the pregnancy.  Smoked for a total of 5 years.        She has a history of MS and thyroid disease hypothyroid currently taking Synthroid.    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        Review of Systems    Past Medical History:   Diagnosis Date     Anxiety disorder 2012     Class 1 obesity due to excess calories with serious comorbidity and body mass index (BMI) of 30.0 to 30.9 in adult 2019     Discogenic low back pain 2019     Essential hypertension 09/05/2019    borderline     History of abnormal cervical Pap smear 2017 4/13/18:  Pap NIL/HPV neg 2/2017: Pap LSIL HPV + (not 16/18)     Hypothyroid 2002     Kidney stone 2019     Multiple sclerosis (H) 2018    MS, relapsing, remitting:  Neurology.  Presented with optic neuritis.      Family History   Problem Relation Age of Onset     Hypertension Father      Cerebrovascular Disease Father 54        hemorrhagic stroke d/t rupture of brain aneurysm     Aneurysm Father      Neurologic Disorder Mother         ms     No Known Problems Sister      Cancer Maternal Grandmother         brain     Multiple Sclerosis Maternal Grandfather      Heart Disease Paternal Grandmother         chf     Diabetes Paternal Grandfather      Diabetes Other      Cancer Paternal Uncle         cancer in eye and liver      Current Outpatient Medications   Medication Sig Dispense Refill      ibuprofen (ADVIL/MOTRIN) 600 MG tablet Take 1 tablet (600 mg) by mouth every 6 hours as needed (for mild to moderate pain) 30 tablet 0     levonorgestrel (MIRENA, 52 MG,) 20 MCG/24HR IUD One device, intrauterine, for up to 5 years.       levothyroxine (SYNTHROID/LEVOTHROID) 175 MCG tablet Take 1 tablet (175 mcg) by mouth daily 90 tablet 1     Misc. Devices (BREAST PUMP) MISC 1 each as needed (breast feeding) 1 each 0     Social History     Tobacco Use     Smoking status: Former Smoker     Quit date: 12/15/2019     Years since quittin.8     Smokeless tobacco: Never Used   Substance Use Topics     Alcohol use: No       OBJECTIVE  BP (!) 170/89   Pulse 79   Temp 98.7  F (37.1  C) (Oral)   Wt 95.2 kg (209 lb 12.8 oz)   SpO2 99%   BMI 31.90 kg/m      Physical Exam  Neck:      Musculoskeletal: Normal range of motion.   Cardiovascular:      Rate and Rhythm: Normal rate.      Pulses: Normal pulses.   Pulmonary:      Effort: Pulmonary effort is normal.   Musculoskeletal:      Comments: Decreased range of motion ulnar deviation of the right wrist also to classic tenderness along the EPB and EPL tendons Finkelstein test was also positive         Palpable tenderness along the EPB and APL tendons along the radial thumb side distal wrist area classic for de Quervain's.    ASSESSMENT:    ICD-10-CM    1. De Quervain's disease (tenosynovitis)  M65.4         PLAN:  Recommend wrist bracing support.  I did mention often with lifting her child he can have some tenosynovitis resolved and this is classic for mom caring and holding her child.  Mentioned icing the area bracing the wrist avoid any painful lifting repetitive motions at the wrist when possible.  Consider follow-up with orthopedist if symptoms persist certainly if worsening.  Howard Graham MD

## 2020-11-05 ENCOUNTER — OFFICE VISIT (OUTPATIENT)
Dept: URGENT CARE | Facility: URGENT CARE | Age: 40
End: 2020-11-05
Payer: COMMERCIAL

## 2020-11-05 VITALS
OXYGEN SATURATION: 99 % | RESPIRATION RATE: 16 BRPM | TEMPERATURE: 97.7 F | SYSTOLIC BLOOD PRESSURE: 140 MMHG | DIASTOLIC BLOOD PRESSURE: 92 MMHG | HEART RATE: 83 BPM

## 2020-11-05 DIAGNOSIS — R05.9 COUGH: Primary | ICD-10-CM

## 2020-11-05 DIAGNOSIS — J02.9 SORE THROAT: ICD-10-CM

## 2020-11-05 LAB
SARS-COV-2 RNA SPEC QL NAA+PROBE: NOT DETECTED
SPECIMEN SOURCE: NORMAL

## 2020-11-05 PROCEDURE — 99213 OFFICE O/P EST LOW 20 MIN: CPT | Performed by: PHYSICIAN ASSISTANT

## 2020-11-05 PROCEDURE — U0003 INFECTIOUS AGENT DETECTION BY NUCLEIC ACID (DNA OR RNA); SEVERE ACUTE RESPIRATORY SYNDROME CORONAVIRUS 2 (SARS-COV-2) (CORONAVIRUS DISEASE [COVID-19]), AMPLIFIED PROBE TECHNIQUE, MAKING USE OF HIGH THROUGHPUT TECHNOLOGIES AS DESCRIBED BY CMS-2020-01-R: HCPCS | Performed by: PHYSICIAN ASSISTANT

## 2020-11-05 ASSESSMENT — ENCOUNTER SYMPTOMS
COUGH: 1
GASTROINTESTINAL NEGATIVE: 1
SHORTNESS OF BREATH: 0
FEVER: 0
CHILLS: 0
SORE THROAT: 1
RHINORRHEA: 1
MYALGIAS: 0
HEADACHES: 1
APPETITE CHANGE: 0

## 2020-11-05 NOTE — PATIENT INSTRUCTIONS
Patient Education     Self-Care for Sore Throats     Sore throats happen for many reasons, such as colds, allergies, cigarette smoke, air pollution, and infections caused by viruses or bacteria. In any case, your throat becomes red and sore. Your goal for self-care is to reduce your discomfort while giving your throat a chance to heal.  Moisten and soothe your throat  Tips include the following:    Try a sip of water first thing after waking up.    Keep your throat moist by drinking 6 or more glasses of clear liquids every day.    Run a cool-air humidifier in your room overnight.    Stay away from cigarette smoke.     Check the air quality index,if air pollution gives you a sore throat. On high pollution days, try to limit outdoor time.    Suck on throat lozenges, cough drops, hard candy, ice chips, or frozen fruit-juice bars. Use the sugar-free versions if your diet or medical condition requires them.  Gargle to ease irritation  Gargling every hour or 2 can ease irritation. Try gargling with 1 of these solutions:    1/4 teaspoon of salt in 1/2 cup of warm water    An over-the-counter anesthetic gargle  Use medicine for more relief  Over-the-counter medicine can reduce sore throat symptoms. Ask your pharmacist if you have questions about which medicine to use. To prevent possible medicine interactions, let the pharmacist know what medicines you take. To decrease symptoms:    Ease pain with anesthetic sprays. Aspirin or an aspirin substitute also helps. Remember, never give aspirin to anyone 18 or younger. Don't take aspirin if you are already taking blood thinners.     For sore throats caused by allergies, try antihistamines to block the allergic reaction.  Unless a sore throat is caused by a bacterial infection, antibiotics won t help you.  Prevent future sore throats  Prevention tips include:    Stop smoking or reduce contact with secondhand smoke. Smoke irritates the tender throat lining.    Limit contact with  pets and with allergy-causing substances, such as pollen and mold.    Wash your hands often when you re around someone with a sore throat or cold. This will keep viruses or bacteria from spreading.    Limit outdoor time when air pollution is bad.    Don t strain your vocal cords.  When to call your healthcare provider  Contact your healthcare provider if you have:    Fever of 100.4 F (38.0 C) or higher, or as directed by your healthcare provider    White spots on the throat    Great Trouble swallowing    A skin rash    Recent exposure to someone else with strep bacteria    Severe hoarseness and swollen glands in the neck or jaw  Call 911  Call 911 if any of the following occur:    Trouble breathing or catching your breath    Drooling and problems swallowing    Wheezing    Unable to talk    Feeling dizzy or faint    Feeling of doom  Nandini last reviewed this educational content on 9/1/2019 2000-2020 [employer]. [address] [place] [address]. All rights reserved. This information is not intended as a substitute for professional medical care. Always follow your healthcare professional's instructions.

## 2020-11-05 NOTE — NURSING NOTE
"Vital signs:                         Estimated body mass index is 31.9 kg/m  as calculated from the following:    Height as of 7/22/20: 1.727 m (5' 8\").    Weight as of 11/4/20: 95.2 kg (209 lb 12.8 oz).          "

## 2020-11-05 NOTE — PROGRESS NOTES
SUBJECTIVE:   Nickie Garcia is a 40 year old female presenting with a chief complaint of   Chief Complaint   Patient presents with     Throat Problem     sore throat sx started yesterday, coughing and runny nose. No fever no chills.        She is an established patient of [facility].  Patient presents with a cough and runny nose since yesterday.  +smoker.  No exposure.  Cough productive yellow.  Ibuprofen this morning.      URI Adult    Onset of symptoms was 1 day(s) ago.  Course of illness is same.    Severity moderate  Current and Associated symptoms: runny nose, stuffy nose, cough - productive and sore throat  Treatment measures tried include Tylenol/Ibuprofen.  Predisposing factors include None.        Review of Systems   Constitutional: Negative for appetite change, chills and fever.   HENT: Positive for congestion, rhinorrhea and sore throat. Negative for ear pain.    Respiratory: Positive for cough. Negative for shortness of breath.    Cardiovascular: Negative for chest pain.   Gastrointestinal: Negative.    Musculoskeletal: Negative for myalgias.   Neurological: Positive for headaches.   All other systems reviewed and are negative.      Past Medical History:   Diagnosis Date     Anxiety disorder 2012     Class 1 obesity due to excess calories with serious comorbidity and body mass index (BMI) of 30.0 to 30.9 in adult 2019     Discogenic low back pain 2019     Essential hypertension 09/05/2019    borderline     History of abnormal cervical Pap smear 2017 4/13/18:  Pap NIL/HPV neg 2/2017: Pap LSIL HPV + (not 16/18)     Hypothyroid 2002     Kidney stone 2019     Multiple sclerosis (H) 2018    MS, relapsing, remitting:  Neurology.  Presented with optic neuritis.      Family History   Problem Relation Age of Onset     Hypertension Father      Cerebrovascular Disease Father 54        hemorrhagic stroke d/t rupture of brain aneurysm     Aneurysm Father      Neurologic Disorder Mother         ms     No Known  Problems Sister      Cancer Maternal Grandmother         brain     Multiple Sclerosis Maternal Grandfather      Heart Disease Paternal Grandmother         chf     Diabetes Paternal Grandfather      Diabetes Other      Cancer Paternal Uncle         cancer in eye and liver      Current Outpatient Medications   Medication Sig Dispense Refill     ibuprofen (ADVIL/MOTRIN) 600 MG tablet Take 1 tablet (600 mg) by mouth every 6 hours as needed (for mild to moderate pain) 30 tablet 0     levonorgestrel (MIRENA, 52 MG,) 20 MCG/24HR IUD One device, intrauterine, for up to 5 years.       levothyroxine (SYNTHROID/LEVOTHROID) 175 MCG tablet Take 1 tablet (175 mcg) by mouth daily 90 tablet 1     Misc. Devices (BREAST PUMP) MISC 1 each as needed (breast feeding) (Patient not taking: Reported on 2020) 1 each 0     Social History     Tobacco Use     Smoking status: Former Smoker     Quit date: 12/15/2019     Years since quittin.8     Smokeless tobacco: Never Used   Substance Use Topics     Alcohol use: No       OBJECTIVE  BP (!) 140/92 (BP Location: Right arm, Cuff Size: Adult Large)   Pulse 83   Temp 97.7  F (36.5  C) (Oral)   Resp 16   SpO2 99%     Physical Exam  Vitals signs and nursing note reviewed.   Constitutional:       General: She is not in acute distress.     Appearance: Normal appearance. She is normal weight. She is not ill-appearing or toxic-appearing.   HENT:      Head: Normocephalic and atraumatic.      Right Ear: Tympanic membrane, ear canal and external ear normal.      Left Ear: Tympanic membrane, ear canal and external ear normal.      Mouth/Throat:      Mouth: Mucous membranes are moist.      Pharynx: Oropharynx is clear.   Eyes:      Extraocular Movements: Extraocular movements intact.      Conjunctiva/sclera: Conjunctivae normal.   Neck:      Musculoskeletal: Normal range of motion and neck supple.   Cardiovascular:      Rate and Rhythm: Normal rate and regular rhythm.      Pulses: Normal pulses.       Heart sounds: Normal heart sounds.   Pulmonary:      Effort: Pulmonary effort is normal.      Breath sounds: Normal breath sounds.   Lymphadenopathy:      Cervical: No cervical adenopathy.   Skin:     General: Skin is warm and dry.      Capillary Refill: Capillary refill takes less than 2 seconds.   Neurological:      General: No focal deficit present.      Mental Status: She is alert.   Psychiatric:         Mood and Affect: Mood normal.         Behavior: Behavior normal.         Labs:  No results found for this or any previous visit (from the past 24 hour(s)).    X-Ray was not done.    ASSESSMENT:      ICD-10-CM    1. Cough  R05 Symptomatic COVID-19 Virus (Coronavirus) by PCR   2. Sore throat  J02.9         Medical Decision Making:    Differential Diagnosis:COVID, URI, bronchitis    Serious Comorbid Conditions:  Adult:  as above    PLAN:    URI Adult:  Tylenol, Ibuprofen, Fluids, Rest and supportive care.  COVID test pending.  Discussed and recommended CDC guidelines for self isolation.      Followup:    If not improving or if condition worsens, follow up with your Primary Care Provider, If not improving or if conditions worsens over the next 12-24 hours, go to the Emergency Department    Patient Instructions     Patient Education     Self-Care for Sore Throats     Sore throats happen for many reasons, such as colds, allergies, cigarette smoke, air pollution, and infections caused by viruses or bacteria. In any case, your throat becomes red and sore. Your goal for self-care is to reduce your discomfort while giving your throat a chance to heal.  Moisten and soothe your throat  Tips include the following:    Try a sip of water first thing after waking up.    Keep your throat moist by drinking 6 or more glasses of clear liquids every day.    Run a cool-air humidifier in your room overnight.    Stay away from cigarette smoke.     Check the air quality index,if air pollution gives you a sore throat. On high  pollution days, try to limit outdoor time.    Suck on throat lozenges, cough drops, hard candy, ice chips, or frozen fruit-juice bars. Use the sugar-free versions if your diet or medical condition requires them.  Gargle to ease irritation  Gargling every hour or 2 can ease irritation. Try gargling with 1 of these solutions:    1/4 teaspoon of salt in 1/2 cup of warm water    An over-the-counter anesthetic gargle  Use medicine for more relief  Over-the-counter medicine can reduce sore throat symptoms. Ask your pharmacist if you have questions about which medicine to use. To prevent possible medicine interactions, let the pharmacist know what medicines you take. To decrease symptoms:    Ease pain with anesthetic sprays. Aspirin or an aspirin substitute also helps. Remember, never give aspirin to anyone 18 or younger. Don't take aspirin if you are already taking blood thinners.     For sore throats caused by allergies, try antihistamines to block the allergic reaction.  Unless a sore throat is caused by a bacterial infection, antibiotics won t help you.  Prevent future sore throats  Prevention tips include:    Stop smoking or reduce contact with secondhand smoke. Smoke irritates the tender throat lining.    Limit contact with pets and with allergy-causing substances, such as pollen and mold.    Wash your hands often when you re around someone with a sore throat or cold. This will keep viruses or bacteria from spreading.    Limit outdoor time when air pollution is bad.    Don t strain your vocal cords.  When to call your healthcare provider  Contact your healthcare provider if you have:    Fever of 100.4 F (38.0 C) or higher, or as directed by your healthcare provider    White spots on the throat    Great Trouble swallowing    A skin rash    Recent exposure to someone else with strep bacteria    Severe hoarseness and swollen glands in the neck or jaw  Call 911  Call 911 if any of the following occur:    Trouble  breathing or catching your breath    Drooling and problems swallowing    Wheezing    Unable to talk    Feeling dizzy or faint    Feeling of doom  Nandini last reviewed this educational content on 9/1/2019 2000-2020 [employer]. [address] [place] [address], [place] [address]. All rights reserved. This information is not intended as a substitute for professional medical care. Always follow your healthcare professional's instructions.

## 2020-11-19 DIAGNOSIS — Z20.9 EXPOSURE TO POTENTIAL INFECTION: Primary | ICD-10-CM

## 2020-11-19 DIAGNOSIS — E03.9 HYPOTHYROIDISM, UNSPECIFIED TYPE: ICD-10-CM

## 2020-11-19 RX ORDER — LEVOTHYROXINE SODIUM 175 UG/1
175 TABLET ORAL DAILY
Qty: 30 TABLET | Refills: 0 | Status: SHIPPED | OUTPATIENT
Start: 2020-11-19 | End: 2021-01-14 | Stop reason: DRUGHIGH

## 2020-11-19 NOTE — TELEPHONE ENCOUNTER
Advise a recheck TSH at a Lab appointment. May need a decreased dose now postpartum. Please notify.

## 2020-11-19 NOTE — TELEPHONE ENCOUNTER
"Thyroid Protocol Passed    Rerun Protocol (11/19/2020 1:04 PM)     Patient is 12 years or older       Recent (12 mo) or future (30 days) visit within the authorizing provider's specialty    Patient has had an office visit with the authorizing provider or a provider within the authorizing providers department within the previous 12 mos or has a future within next 30 days. See \"Patient Info\" tab in inbasket, or \"Choose Columns\" in Meds & Orders section of the refill encounter.             Medication is active on med list       Normal TSH on file in past 12 months        Recent Labs   Lab Test 07/29/20  1446   TSH 1.92            No active pregnancy on record    If patient is pregnant or has had a positive pregnancy test, please check TSH.        No positive pregnancy test in past 12 months    If patient is pregnant or has had a positive pregnancy test, please check TSH.          Pt last seen 9/24/2020 for PP.    Last prescribed 3/19/2020 for 90 tablets with 1 refill.    Routing refill request to provider for review/approval because:  A break in medication, pt should have ran out of medication earlier.    Radha Reynoso RN on 11/19/2020 at 1:15 PM            "

## 2020-11-19 NOTE — TELEPHONE ENCOUNTER
Pt also asking if a COVID antibody lab test can be added on to her lab appt for 11/24/2020 due to hx of possible past exposure.    RN relayed pt should visit [email or web address] for COVID antibody testing.    RN routing to provider for advisement.    Radha Reynoso RN on 11/19/2020 at 4:28 PM

## 2020-11-20 DIAGNOSIS — Z20.9 EXPOSURE TO POTENTIAL INFECTION: ICD-10-CM

## 2020-11-20 DIAGNOSIS — E03.9 HYPOTHYROIDISM, UNSPECIFIED TYPE: ICD-10-CM

## 2020-11-20 LAB — TSH SERPL DL<=0.005 MIU/L-ACNC: 13.89 MU/L (ref 0.4–4)

## 2020-11-20 PROCEDURE — 84443 ASSAY THYROID STIM HORMONE: CPT | Performed by: OBSTETRICS & GYNECOLOGY

## 2020-11-20 PROCEDURE — 36415 COLL VENOUS BLD VENIPUNCTURE: CPT | Performed by: OBSTETRICS & GYNECOLOGY

## 2020-11-20 PROCEDURE — 86769 SARS-COV-2 COVID-19 ANTIBODY: CPT | Performed by: OBSTETRICS & GYNECOLOGY

## 2020-11-20 NOTE — TELEPHONE ENCOUNTER
Noted. Suggest continuing same dose for another 4 weeks and then recheck TSH at Lab appointment- future order placed. If the TSH corrects substantially then may need to adjust the dose and do another recheck 8 weeks later. Please notify.   Dipesh Ni MD

## 2020-11-20 NOTE — TELEPHONE ENCOUNTER
"Pt states she has missed a few doses within the past couple weeks due to starting a new job and \"things being hectic\".    RN called and had COVID antibody testing added to lab draw.    Pt asking when or if she should have TSH retested, pt states she will take her levothyroxine consistently now.    RN routing to provider for advisement regarding TSH f/u.    Radha Reynoso RN on 11/20/2020 at 1:43 PM    "

## 2020-11-20 NOTE — TELEPHONE ENCOUNTER
TSH was high. Uncertain if patient had missed any doses recently prior to this test. Any? Since we had increased the levothyroxine earlier during pregnancy I would expect the dose required would be decreased now postpartum.     Please see if Lab can add the COVID- 19 Ab testing. The test may be helpful though may have potential false positives (cross reacting with other coronavirus exposure) or false negatives (not able to detect cellular T-cell based immunity). I have placed a future order for this to be used and released. It can be done if patient's possible exposure was at least two weeks ago. If within 1 week then an [facility] appointment may be preferable to consider PCR testing too and other counseling.     Please notify.   I am sure patient is well versed in much of the testing as our Infection Prevention RN here at [place].   Dipesh Ni MD

## 2020-11-20 NOTE — TELEPHONE ENCOUNTER
RN called and relayed providers advisement to pt.    Patient verbalized understanding and agreed to plan.     Radha Reynoso RN on 11/20/2020 at 2:48 PM

## 2020-11-24 LAB
COVID-19 SPIKE RBD ABY TITER: NORMAL
COVID-19 SPIKE RBD ABY: NEGATIVE

## 2020-12-06 ENCOUNTER — HEALTH MAINTENANCE LETTER (OUTPATIENT)
Age: 40
End: 2020-12-06

## 2020-12-17 ENCOUNTER — OFFICE VISIT (OUTPATIENT)
Dept: DERMATOLOGY | Facility: CLINIC | Age: 40
End: 2020-12-17
Payer: COMMERCIAL

## 2020-12-17 DIAGNOSIS — D48.5 NEOPLASM OF UNCERTAIN BEHAVIOR OF SKIN: Primary | ICD-10-CM

## 2020-12-17 PROCEDURE — 88305 TISSUE EXAM BY PATHOLOGIST: CPT | Performed by: DERMATOLOGY

## 2020-12-17 PROCEDURE — 11102 TANGNTL BX SKIN SINGLE LES: CPT | Performed by: DERMATOLOGY

## 2020-12-17 ASSESSMENT — PAIN SCALES - GENERAL: PAINLEVEL: NO PAIN (0)

## 2020-12-17 NOTE — LETTER
"    12/17/2020         RE: Nickie Garcia  [address]  [facility] [address]        Dear Colleague,    Thank you for referring your patient, Nickie Garcia, to the [facility]. Please see a copy of my visit note below.    [facility] Dermatology Note  Encounter Date: Dec 17, 2020  Office Visit     Dermatology Problem List:  1. NUB, left pubic s/p biopsy 12/17/20    ____________________________________________    Assessment & Plan:  # NUB, left pubic.  Differential diagnosis, junctional nevus, hyperpigmentation of pregnancy, solar lentigo, macular seborrheic keratosis.     Shave biopsy: Location(s) left pubic.  After discussion of benefits and risks including but not limited to bleeding/bruising, pain/swelling, infection, scar, incomplete removal, nerve damage/numbness, recurrence, and non-diagnostic biopsy, written consent, verbal consent and photographs were obtained. Time-out was performed. The area was cleaned with isopropyl alcohol. 0.5mL of 1% lidocaine with epinephrine was injected to obtain adequate anesthesia of each lesion. Shave biopsy was performed. Hemostasis was achieved with aluminium chloride. Vaseline and a sterile dressing were applied. The patient tolerated the procedure and no complications were noted. The patient was provided with verbal and written post care instructions.     Follow-up: prn for new or changing lesions    Staff:     Miko Danielson DO    Department of Dermatology  [facility]: Phone: [phone], Fax:[phone]  [facility]: Phone: [phone], Fax: [phone]    ____________________________________________    CC: Derm Problem (Nickie is here today for lesion of concern on her left lower abdomen, pt states \"unsure of timeline, noticed it when looking at scar. Does not remember it before " "pregnancy\")      HPI:  Ms. Nickie Garica is a(n) 40 year old female who presents today as a new patient for evaluation of an irregular brown spot on the left side of her pubic area.  She delivered a baby about 1 month ago.  Prior to pregnancy she does not recall it was present but has a hard time identifying how long it has been there.  She denies bleeding, but notes some texture change.     Patient is otherwise feeling well, without additional concerns.    Physical Exam:  Vitals: There were no vitals taken for this visit.  SKIN: Focused examination of left pubic area was performed.  -Irregularly bordered tan macule with scaly central papule.     - No other lesions of concern on areas examined.     Medications:  Current Outpatient Medications   Medication     ibuprofen (ADVIL/MOTRIN) 600 MG tablet     levonorgestrel (MIRENA, 52 MG,) 20 MCG/24HR IUD     levothyroxine (SYNTHROID/LEVOTHROID) 175 MCG tablet     metroNIDAZOLE (FLAGYL) 500 MG tablet     Misc. Devices (BREAST PUMP) MISC     SYNTHROID 150 MCG tablet     No current facility-administered medications for this visit.       Past Medical/Surgical History:   Patient Active Problem List   Diagnosis     CARDIOVASCULAR SCREENING; LDL GOAL LESS THAN 160     Hashimotos thyroiditis     Vitamin D deficiency     Anxiety disorder     Multiple sclerosis (H)     History of abnormal cervical Pap smear     Essential hypertension     Antepartum multigravida of advanced maternal age     Discogenic low back pain     Kidney stone     Class 1 obesity due to excess calories with serious comorbidity and body mass index (BMI) of 30.0 to 30.9 in adult     Anemia     Delayed postpartum hemorrhage     Past Medical History:   Diagnosis Date     Anxiety disorder 2012     Class 1 obesity due to excess calories with serious comorbidity and body mass index (BMI) of 30.0 to 30.9 in adult 2019     Discogenic low back pain 2019     Essential hypertension 09/05/2019    borderline     " History of abnormal cervical Pap smear 2017 4/13/18:  Pap NIL/HPV neg 2/2017: Pap LSIL HPV + (not 16/18)     Hypothyroid 2002     Kidney stone 2019     Multiple sclerosis (H) 2018    MS, relapsing, remitting:  Neurology.  Presented with optic neuritis.            Again, thank you for allowing me to participate in the care of your patient.        Sincerely,        Miko Danielson MD

## 2020-12-17 NOTE — NURSING NOTE
"Nickie DURAN Kendalkirk's goals for this visit include:   Chief Complaint   Patient presents with     Derm Problem     Nickie is here today for lesion of concern on her left lower abdomen, pt states \"unsure of timeline, noticed it when looking at scar. Does not remember it before pregnancy\"       She requests these members of her care team be copied on today's visit information:     PCP: Winter Perdomo    Referring Provider:  No referring provider defined for this encounter.    There were no vitals taken for this visit.    Do you need any medication refills at today's visit? No    Katerin Alvarez LPN      "

## 2020-12-21 LAB — COPATH REPORT: NORMAL

## 2020-12-22 NOTE — RESULT ENCOUNTER NOTE
DAHLIA Only -     I am letting the patient know that the biopsy from the left pubic area showed a seborrheic keratosis, no further treatment needed.     Thad

## 2021-01-03 ENCOUNTER — OFFICE VISIT (OUTPATIENT)
Dept: URGENT CARE | Facility: URGENT CARE | Age: 41
End: 2021-01-03
Payer: COMMERCIAL

## 2021-01-03 VITALS
BODY MASS INDEX: 32.05 KG/M2 | SYSTOLIC BLOOD PRESSURE: 153 MMHG | WEIGHT: 210.8 LBS | OXYGEN SATURATION: 100 % | DIASTOLIC BLOOD PRESSURE: 92 MMHG | RESPIRATION RATE: 18 BRPM | TEMPERATURE: 96.6 F | HEART RATE: 87 BPM

## 2021-01-03 DIAGNOSIS — R19.7 DIARRHEA OF PRESUMED INFECTIOUS ORIGIN: Primary | ICD-10-CM

## 2021-01-03 DIAGNOSIS — K58.9 IRRITABLE BOWEL SYNDROME, UNSPECIFIED TYPE: ICD-10-CM

## 2021-01-03 DIAGNOSIS — A49.8: ICD-10-CM

## 2021-01-03 DIAGNOSIS — T81.49XA: ICD-10-CM

## 2021-01-03 LAB
C DIFF TOX B STL QL: NEGATIVE
SPECIMEN SOURCE: NORMAL

## 2021-01-03 PROCEDURE — 99214 OFFICE O/P EST MOD 30 MIN: CPT | Performed by: PHYSICIAN ASSISTANT

## 2021-01-03 PROCEDURE — 87493 C DIFF AMPLIFIED PROBE: CPT | Performed by: PHYSICIAN ASSISTANT

## 2021-01-03 RX ORDER — LEVOTHYROXINE SODIUM 150 MCG
TABLET ORAL
COMMUNITY
Start: 2020-12-05 | End: 2021-01-17

## 2021-01-03 RX ORDER — METRONIDAZOLE 500 MG/1
500 TABLET ORAL 3 TIMES DAILY
Qty: 30 TABLET | Refills: 0 | Status: SHIPPED | OUTPATIENT
Start: 2021-01-03 | End: 2021-01-14

## 2021-01-03 NOTE — PROGRESS NOTES
S: 40-year-old female presents for loose stools for 2 weeks.  Has history of IBS but this is much worse than normal.  Yellow/green mucus at least 5 episodes a day.  No blood.  No fever.  She just recently delivered a baby.  Her 4-month-old was diagnosed with C. difficile 3 weeks ago and started on Flagyl and his symptoms have completely resolved.  His bottom was raw from the diarrhea but has since healed since starting the Flagyl.          Allergies   Allergen Reactions     Wellbutrin [Bupropion] Other (See Comments)     Optic neuritis       Past Medical History:   Diagnosis Date     Anxiety disorder      Class 1 obesity due to excess calories with serious comorbidity and body mass index (BMI) of 30.0 to 30.9 in adult 2019     Discogenic low back pain 2019     Essential hypertension 2019    borderline     History of abnormal cervical Pap smear 20:  Pap NIL/HPV neg 2017: Pap LSIL HPV + (not 16/18)     Hypothyroid      Kidney stone      Multiple sclerosis (H) 2018    MS, relapsing, remitting:  Neurology.  Presented with optic neuritis.             ibuprofen (ADVIL/MOTRIN) 600 MG tablet, Take 1 tablet (600 mg) by mouth every 6 hours as needed (for mild to moderate pain)       levonorgestrel (MIRENA, 52 MG,) 20 MCG/24HR IUD, One device, intrauterine, for up to 5 years.       SYNTHROID 150 MCG tablet,        levothyroxine (SYNTHROID/LEVOTHROID) 175 MCG tablet, Take 1 tablet (175 mcg) by mouth daily (Patient not taking: Reported on 1/3/2021)       Misc. Devices (BREAST PUMP) MISC, 1 each as needed (breast feeding) (Patient not taking: Reported on 2020)    No current facility-administered medications on file prior to visit.       Social History     Tobacco Use     Smoking status: Former Smoker     Quit date: 12/15/2019     Years since quittin.0     Smokeless tobacco: Never Used   Substance Use Topics     Alcohol use: No     Drug use: No       ROS:  General: negative for  fever  ABD: abd cramping  : negative for dysuria    OBJECTIVE:  BP (!) 153/92 (BP Location: Left arm, Patient Position: Sitting, Cuff Size: Adult Large)   Pulse 87   Temp 96.6  F (35.9  C) (Tympanic)   Resp 18   Wt 95.6 kg (210 lb 12.8 oz)   SpO2 100%   Breastfeeding No   BMI 32.05 kg/m     General:   awake, alert, and cooperative.  NAD.   Head: Normocephalic, atraumatic.  Eyes: Conjunctiva clear, non icteric.   ABD: soft, no tenderness to palpation , no rigidity, guarding or rebound . No CVAT  Neuro: Alert and oriented - normal speech.     ASSESSMENT:      ICD-10-CM    1. Diarrhea of presumed infectious origin  R19.7 metroNIDAZOLE (FLAGYL) 500 MG tablet     Clostridium difficile Toxin B PCR   2. Postoperative Clostridioides difficile infection  T81.49XA     A49.8    3. Irritable bowel syndrome, unspecified type  K58.9        PLAN: Patient with irritable bowel syndrome but recent diarrhea seems different.  Her  has C. difficile.  Likely she has C. difficile infection after delivery of her son.  Although there is some resistance to Flagyl for C. difficile the Flagyl did work well for her infant.  Will start on Flagyl.  Turn in C. difficile for stool culture prior to starting the antibiotic.   As per ordered above.    Advised about symptoms which might herald more serious problems.      Linn Espinoza PA-C

## 2021-01-05 ENCOUNTER — TELEPHONE (OUTPATIENT)
Dept: FAMILY MEDICINE | Facility: CLINIC | Age: 41
End: 2021-01-05

## 2021-01-05 DIAGNOSIS — I10 ESSENTIAL HYPERTENSION: ICD-10-CM

## 2021-01-05 RX ORDER — AMLODIPINE BESYLATE 5 MG/1
5 TABLET ORAL DAILY
Qty: 30 TABLET | Refills: 6 | OUTPATIENT
Start: 2021-01-05

## 2021-01-05 NOTE — TELEPHONE ENCOUNTER
Medication discontinued from patient's medication list. Refused.     Marialuisa Piedra, RN, BSN, PHN  [facility]: [place]

## 2021-01-05 NOTE — TELEPHONE ENCOUNTER
Operative Report  Patient: Milagro Poon; 87 year old   MRN#: [id number]  Surgery Date: 7/10/2017    PREOPERATIVE DIAGNOSIS:  Cataract of the left eye.    POSTOPERATIVE DIAGNOSIS:  Cataract of the left eye.    PROCEDURE:  Left eye phacoemulsification with cataract extraction and posterior chamber lens implantation.    ANESTHESIOLOGIST:  Monitored anesthesia care.  Estimated blood loss -0    PROCEDURE:  The patient was sterilely prepped and draped about the left eye.  Lid speculum placed in the eye. The Superblade penetrated the anterior chamber at the limbus. A 2.75 mm blade penetrated the anterior chamber with temporal incision, Lidocaine plain non preserved was injected into the anterior chamber, healon endocoat was inserted and a 5 mm anterior capsulotomy was performed. The lens and nucleus were hydrodissected and phacoemulsified using the phaco chop technique. The cortical material was irrigated and aspirated from the eye. The posterior capsule was polished and irrigated. The viscoelastic was used to fill posterior and anterior chamber. After the intraocular lens was inserted the viscoelastic was removed from the eye. Sutureless wound closed with corneal hydration. Digital pressure applied and was watertight. Several drops of Vigamox were placed on the eye at the end of the procedure. The eye was patched and shielded and the patient was taken to same day surgery in good condition.     Elfego Matta DO     amLODIPine (NORVASC) 5 MG tablet (Discontinued)      Last Written Prescription Date:  7/15/2019  Last Fill Quantity: 30 tabs,   # refills: 3  Last Office Visit: 1/21/2020  Future Office visit:       Routing refill request to provider for review/approval because:  Drug not active on patient's medication list

## 2021-01-09 DIAGNOSIS — E03.9 HYPOTHYROIDISM, UNSPECIFIED TYPE: Primary | ICD-10-CM

## 2021-01-11 NOTE — PROGRESS NOTES
"[facility] Dermatology Note  Encounter Date: Dec 17, 2020  Office Visit     Dermatology Problem List:  1. NUB, left pubic s/p biopsy 12/17/20    ____________________________________________    Assessment & Plan:  # NUB, left pubic.  Differential diagnosis, junctional nevus, hyperpigmentation of pregnancy, solar lentigo, macular seborrheic keratosis.     Shave biopsy: Location(s) left pubic.  After discussion of benefits and risks including but not limited to bleeding/bruising, pain/swelling, infection, scar, incomplete removal, nerve damage/numbness, recurrence, and non-diagnostic biopsy, written consent, verbal consent and photographs were obtained. Time-out was performed. The area was cleaned with isopropyl alcohol. 0.5mL of 1% lidocaine with epinephrine was injected to obtain adequate anesthesia of each lesion. Shave biopsy was performed. Hemostasis was achieved with aluminium chloride. Vaseline and a sterile dressing were applied. The patient tolerated the procedure and no complications were noted. The patient was provided with verbal and written post care instructions.     Follow-up: prn for new or changing lesions    Staff:     Miko Danielson DO    Department of Dermatology  [facility] [place]: Phone: [phone], Fax:[phone]  [facility]: Phone: [phone], Fax: [phone]    ____________________________________________    CC: Derm Problem (Nickie is here today for lesion of concern on her left lower abdomen, pt states \"unsure of timeline, noticed it when looking at scar. Does not remember it before pregnancy\")      HPI:  Ms. Nickie Garcia is a(n) 40 year old female who presents today as a new patient for evaluation of an irregular brown spot on the left side of her pubic area.  She delivered a baby about 1 month ago.  Prior to pregnancy she does not recall it " was present but has a hard time identifying how long it has been there.  She denies bleeding, but notes some texture change.     Patient is otherwise feeling well, without additional concerns.    Physical Exam:  Vitals: There were no vitals taken for this visit.  SKIN: Focused examination of left pubic area was performed.  -Irregularly bordered tan macule with scaly central papule.     - No other lesions of concern on areas examined.     Medications:  Current Outpatient Medications   Medication     ibuprofen (ADVIL/MOTRIN) 600 MG tablet     levonorgestrel (MIRENA, 52 MG,) 20 MCG/24HR IUD     levothyroxine (SYNTHROID/LEVOTHROID) 175 MCG tablet     metroNIDAZOLE (FLAGYL) 500 MG tablet     Misc. Devices (BREAST PUMP) MISC     SYNTHROID 150 MCG tablet     No current facility-administered medications for this visit.       Past Medical/Surgical History:   Patient Active Problem List   Diagnosis     CARDIOVASCULAR SCREENING; LDL GOAL LESS THAN 160     Hashimotos thyroiditis     Vitamin D deficiency     Anxiety disorder     Multiple sclerosis (H)     History of abnormal cervical Pap smear     Essential hypertension     Antepartum multigravida of advanced maternal age     Discogenic low back pain     Kidney stone     Class 1 obesity due to excess calories with serious comorbidity and body mass index (BMI) of 30.0 to 30.9 in adult     Anemia     Delayed postpartum hemorrhage     Past Medical History:   Diagnosis Date     Anxiety disorder 2012     Class 1 obesity due to excess calories with serious comorbidity and body mass index (BMI) of 30.0 to 30.9 in adult 2019     Discogenic low back pain 2019     Essential hypertension 09/05/2019    borderline     History of abnormal cervical Pap smear 2017 4/13/18:  Pap NIL/HPV neg 2/2017: Pap LSIL HPV + (not 16/18)     Hypothyroid 2002     Kidney stone 2019     Multiple sclerosis (H) 2018    MS, relapsing, remitting:  Neurology.  Presented with optic neuritis.

## 2021-01-12 NOTE — TELEPHONE ENCOUNTER
Refill request for the synthroid 150mcg.    Call to patient; needing to confirm the followin.  Is she taking synthroid or levothyroxine?  2.  What dose is she currently taking? 150mcg or 175mcg?  3.  She is due now for her thyroid labwork; per  refill encounter was to recheck in 8 weeks.  Ratna -[phone]

## 2021-01-13 ENCOUNTER — TELEPHONE (OUTPATIENT)
Dept: NURSING | Facility: CLINIC | Age: 41
End: 2021-01-13

## 2021-01-13 NOTE — TELEPHONE ENCOUNTER
Nickie returns call and states she is taking the Synthroid medication the 150 mg tablet. She needs a refill now and then will schedule for lab.  If she needs to do lab before refill call her back.

## 2021-01-14 ENCOUNTER — OFFICE VISIT (OUTPATIENT)
Dept: FAMILY MEDICINE | Facility: CLINIC | Age: 41
End: 2021-01-14
Payer: COMMERCIAL

## 2021-01-14 VITALS
OXYGEN SATURATION: 99 % | DIASTOLIC BLOOD PRESSURE: 96 MMHG | HEIGHT: 69 IN | RESPIRATION RATE: 17 BRPM | TEMPERATURE: 98.7 F | HEART RATE: 87 BPM | BODY MASS INDEX: 30.81 KG/M2 | SYSTOLIC BLOOD PRESSURE: 138 MMHG | WEIGHT: 208 LBS

## 2021-01-14 DIAGNOSIS — I10 ESSENTIAL HYPERTENSION: ICD-10-CM

## 2021-01-14 DIAGNOSIS — F17.200 SMOKER: Primary | ICD-10-CM

## 2021-01-14 PROCEDURE — 99214 OFFICE O/P EST MOD 30 MIN: CPT | Performed by: FAMILY MEDICINE

## 2021-01-14 RX ORDER — VARENICLINE TARTRATE 1 MG/1
1 TABLET, FILM COATED ORAL 2 TIMES DAILY
Qty: 30 TABLET | Refills: 1 | Status: SHIPPED | OUTPATIENT
Start: 2021-01-14 | End: 2021-09-24

## 2021-01-14 RX ORDER — AMLODIPINE BESYLATE 5 MG/1
5 TABLET ORAL DAILY
Qty: 90 TABLET | Refills: 1 | Status: SHIPPED | OUTPATIENT
Start: 2021-01-14 | End: 2021-08-30

## 2021-01-14 ASSESSMENT — PAIN SCALES - GENERAL: PAINLEVEL: NO PAIN (1)

## 2021-01-14 ASSESSMENT — MIFFLIN-ST. JEOR: SCORE: 1669.92

## 2021-01-14 NOTE — PROGRESS NOTES
"  Assessment & Plan     Smoker  Motivated to quit.  Reviewed Chantix use and typical side effects.  She has tolerated the medication okay in the past.  We will plan to treat initially about 3 months but can titrate treatment to her needs.  - varenicline (CHANTIX BEHZAD) 0.5 MG X 11 & 1 MG X 42 tablet; Take 0.5 mg tab daily for 3 days, THEN 0.5 mg tab twice daily for 4 days, THEN 1 mg twice daily.  - varenicline (CHANTIX) 1 MG tablet; Take 1 tablet (1 mg) by mouth 2 times daily    Essential hypertension  Need to restart blood pressure medication.  Will resume amlodipine which had worked well for her in the past with no side effects.  Lifestyle treatment reviewed.  Certainly needs further evaluation of her headaches and pulsatile tinnitus if they persist despite blood pressure treatment.  We did review Flonase or Nasonex OTC for her ear congestion as needed although no abnormalities on exam today.  - amLODIPine (NORVASC) 5 MG tablet; Take 1 tablet (5 mg) by mouth daily    She will follow-up in 2 to 3 months to recheck blood pressure and complete her annual physical and Pap.  Recommend she check blood pressure in the interim at [place] pharmacies     Tobacco Cessation:   reports that she has been smoking. She has been smoking about 0.50 packs per day. She has never used smokeless tobacco.      BMI:   Estimated body mass index is 31.17 kg/m  as calculated from the following:    Height as of this encounter: 1.74 m (5' 8.5\").    Weight as of this encounter: 94.3 kg (208 lb).   Weight management plan: She is 5 months postpartum but is working on weight loss and has cut out alcohol.  Encouraged continued use as this will also help her blood pressure      Return in about 3 months (around 4/14/2021) for Routine preventive/pap.    Maribel Truong MD  [facility] CARLOS Fu is a 40 year old who presents to clinic today for the following health issues  accompanied by her self:    HPI "       Hypertension Follow-up      Do you check your blood pressure regularly outside of the clinic? No     Are you following a low salt diet? No    Are your blood pressures ever more than 140 on the top number (systolic) OR more   than 90 on the bottom number (diastolic), for example 140/90? Yes      How many servings of fruits and vegetables do you eat daily?  2-3    On average, how many sweetened beverages do you drink each day (Examples: soda, juice, sweet tea, etc.  Do NOT count diet or artificially sweetened beverages)?   2    How many days per week do you exercise enough to make your heart beat faster? 3 or less    How many minutes a day do you exercise enough to make your heart beat faster? 9 or less    How many days per week do you miss taking your medication? Not currently on HTN medication    History of chronic essential hypertension.  Was recently pregnant and was able to stop her medications during pregnancy as her blood pressure was normal.    Now starting to notice hearteat in ear, more headaches (just dull, tension type) and feel bp is starting to creep back up.  BP has been elevated on last few checks.    Also would like chantix to quit smoking.     Kids are home and working from home. 1/2-1 ppd. Use went up with working from home. Quit with pg.   Has used chantix in past with success before relapse again. Feels more motivated to quit this time so hopeful will be more succussful.  Did have crazy dreams and felt like she slept very hard with Chantix in the past    5 months post-partum. Not nursing.   mirena for birth control.   3 boys.     Some seasonal congestion.  Usually in the fall and spring.  Currently does wonder if her ears feel somewhat congested though.  Has decreased hearing in right ear chronically      Review of Systems   Constitutional, HEENT, cardiovascular, pulmonary, gi and gu systems are negative, except as otherwise noted.      Objective    BP (!) 137/91 (BP Location: Right arm,  "Patient Position: Chair, Cuff Size: Adult Large)   Pulse 87   Temp 98.7  F (37.1  C) (Oral)   Resp 17   Ht 1.74 m (5' 8.5\")   Wt 94.3 kg (208 lb)   LMP  (Exact Date)   SpO2 99%   Breastfeeding No   BMI 31.17 kg/m    Body mass index is 31.17 kg/m .  Physical Exam   GENERAL: healthy, alert and no distress  HENT: ear canals and TM's normal, nose and mouth without ulcers or lesions  NECK: no adenopathy, no asymmetry, masses, or scars and thyroid normal to palpation  RESP: lungs clear to auscultation - no rales, rhonchi or wheezes  CV: regular rate and rhythm, normal S1 S2, no S3 or S4, no murmur, click or rub, no peripheral edema and peripheral pulses strong.  No carotid bruits  ABDOMEN: soft, nontender, no hepatosplenomegaly, no masses and bowel sounds normal  MS: no gross musculoskeletal defects noted, no edema  PSYCH: Affect is full, mood is upbeat            "

## 2021-01-15 NOTE — TELEPHONE ENCOUNTER
"Pt states she is currently taking synthroid 150mcg consistently for past week. Pt states she missed doses and was not taking it consistently.    Pt stopped taking levothyroxine due to side effects, now on synthroid.     Pt asking provider if she should wait to have her labs drawn as she plans to take this medication \"more religiously now\".    RN routing to provider for advisement on timing of pt's labs. RN notes future order placed. Pt states she will be out of medication in 1 week and asking if she should f/u with her primary care for ongoing management.    Radha Reynoso RN on 1/15/2021 at 11:36 AM    "

## 2021-01-17 RX ORDER — LEVOTHYROXINE SODIUM 150 MCG
150 TABLET ORAL DAILY
Qty: 90 TABLET | Refills: 0 | Status: SHIPPED | OUTPATIENT
Start: 2021-01-17 | End: 2021-09-24

## 2021-01-18 NOTE — TELEPHONE ENCOUNTER
RN called and relayed providers advisement to pt.    Patient verbalized understanding and agreed to plan.     Patient was given the opportunity to ask additional questions and have them answered. Patient had no further questions.       Radha Reynoso RN on 1/18/2021 at 8:50 AM

## 2021-02-16 ENCOUNTER — MEDICAL CORRESPONDENCE (OUTPATIENT)
Dept: HEALTH INFORMATION MANAGEMENT | Facility: CLINIC | Age: 41
End: 2021-02-16

## 2021-02-25 DIAGNOSIS — Z12.31 VISIT FOR SCREENING MAMMOGRAM: ICD-10-CM

## 2021-02-25 PROCEDURE — 77063 BREAST TOMOSYNTHESIS BI: CPT | Mod: GC | Performed by: STUDENT IN AN ORGANIZED HEALTH CARE EDUCATION/TRAINING PROGRAM

## 2021-02-25 PROCEDURE — 77067 SCR MAMMO BI INCL CAD: CPT | Mod: GC | Performed by: STUDENT IN AN ORGANIZED HEALTH CARE EDUCATION/TRAINING PROGRAM

## 2021-04-11 DIAGNOSIS — E03.9 HYPOTHYROIDISM, UNSPECIFIED TYPE: ICD-10-CM

## 2021-04-11 PROCEDURE — 36415 COLL VENOUS BLD VENIPUNCTURE: CPT | Performed by: OBSTETRICS & GYNECOLOGY

## 2021-04-11 PROCEDURE — 84443 ASSAY THYROID STIM HORMONE: CPT | Performed by: OBSTETRICS & GYNECOLOGY

## 2021-04-11 NOTE — PROGRESS NOTES
31w1d.  Tired.  No HA, visual changes, N/V etc. On her last day of steroids for back pains. Next [facility] appointment on 7/10/20. RTC 2 wk/prn.    ICD-10-CM    1. Antepartum multigravida of advanced maternal age  O09.529    2. Hypothyroidism, unspecified type  E03.9      CEPHAS AGBEH, MD.       Hypertensive Disorder

## 2021-04-12 DIAGNOSIS — E03.8 OTHER SPECIFIED HYPOTHYROIDISM: Primary | ICD-10-CM

## 2021-04-12 LAB — TSH SERPL DL<=0.005 MIU/L-ACNC: 18.62 MU/L (ref 0.4–4)

## 2021-04-26 DIAGNOSIS — E03.9 HYPOTHYROIDISM, UNSPECIFIED TYPE: ICD-10-CM

## 2021-04-26 RX ORDER — LEVOTHYROXINE SODIUM 150 MCG
150 TABLET ORAL DAILY
Qty: 90 TABLET | Refills: 0 | Status: CANCELLED | OUTPATIENT
Start: 2021-04-26

## 2021-04-26 NOTE — TELEPHONE ENCOUNTER
Per 4/11 TSH lab result note:  Dipesh Ni MD   4/12/2021  2:56 PM CDT      TSH remains elevated on current levothyroxine. May need an increased dose but suggest Endocrinology evaluation for this finding now too. Referral order is in place.     Sending pt a [employer] message to see if she has f/u or scheduled with endocrinology yet.    Danielle Zamudio RN

## 2021-04-27 NOTE — TELEPHONE ENCOUNTER
Pt has appt scheduled with endocrinology for 5/4/2021, awaiting pt's response if in need of refill prior to this appt.    Radha Reynoso RN on 4/27/2021 at 11:15 AM

## 2021-04-29 NOTE — TELEPHONE ENCOUNTER
RN called pt. Pt states she has enough medication to get her to her appt on 5/4/2021 with endocrinology.    RN closing encounter.    Radha Reynoso RN on 4/29/2021 at 9:21 AM

## 2021-05-01 DIAGNOSIS — E03.9 HYPOTHYROIDISM, UNSPECIFIED TYPE: ICD-10-CM

## 2021-05-01 RX ORDER — LEVOTHYROXINE SODIUM 150 MCG
150 TABLET ORAL DAILY
Qty: 90 TABLET | Refills: 0 | Status: CANCELLED | OUTPATIENT
Start: 2021-05-01

## 2021-05-03 NOTE — TELEPHONE ENCOUNTER
"Per 4/26 mychart encounter from pt:  \"Yes I have an appt with endocrine on May 4th and I do have enough medication to get me through to then. Thank you\"    "

## 2021-05-04 ENCOUNTER — MYC MEDICAL ADVICE (OUTPATIENT)
Dept: PEDIATRICS | Facility: CLINIC | Age: 41
End: 2021-05-04

## 2021-05-04 ENCOUNTER — VIRTUAL VISIT (OUTPATIENT)
Dept: ENDOCRINOLOGY | Facility: CLINIC | Age: 41
End: 2021-05-04
Attending: OBSTETRICS & GYNECOLOGY
Payer: COMMERCIAL

## 2021-05-04 DIAGNOSIS — K58.0 IRRITABLE BOWEL SYNDROME WITH DIARRHEA: ICD-10-CM

## 2021-05-04 DIAGNOSIS — E06.3 HYPOTHYROIDISM DUE TO HASHIMOTO'S THYROIDITIS: ICD-10-CM

## 2021-05-04 DIAGNOSIS — K58.0 IRRITABLE BOWEL SYNDROME WITH DIARRHEA: Primary | ICD-10-CM

## 2021-05-04 LAB
T4 FREE SERPL-MCNC: 0.93 NG/DL (ref 0.76–1.46)
TSH SERPL DL<=0.005 MIU/L-ACNC: 8.97 MU/L (ref 0.4–4)

## 2021-05-04 PROCEDURE — 84443 ASSAY THYROID STIM HORMONE: CPT | Performed by: INTERNAL MEDICINE

## 2021-05-04 PROCEDURE — 84439 ASSAY OF FREE THYROXINE: CPT | Performed by: INTERNAL MEDICINE

## 2021-05-04 PROCEDURE — 99204 OFFICE O/P NEW MOD 45 MIN: CPT | Mod: 95 | Performed by: INTERNAL MEDICINE

## 2021-05-04 PROCEDURE — 36415 COLL VENOUS BLD VENIPUNCTURE: CPT | Performed by: INTERNAL MEDICINE

## 2021-05-04 PROCEDURE — 83516 IMMUNOASSAY NONANTIBODY: CPT | Performed by: INTERNAL MEDICINE

## 2021-05-04 RX ORDER — LEVOTHYROXINE SODIUM 200 MCG
200 TABLET ORAL DAILY
Qty: 90 TABLET | Refills: 3 | Status: SHIPPED | OUTPATIENT
Start: 2021-05-04 | End: 2022-05-22

## 2021-05-04 NOTE — LETTER
August 9, 2021      Nickie Garcia  [address]  [place] [address]        Dear Nickie Garcia,    In order to ensure that we are providing the best quality care, we would like to remind you that you are due for a follow up appointment with Dr. Reynolds around 11/4/21.      We have been unable to reach you by phone or [facility]. Please call your clinic or use [facility] to make an appointment with your provider before you run out of medication. This will prevent a delay in your next refill. Please let us know if you have any questions and we would be happy to help.     Thank you for trusting us with your care.    Sincerely,     [facility]  [phone]

## 2021-05-04 NOTE — PROGRESS NOTES
Endocrinology virtual Visit    Chief Complaint: New Patient (Other specified hypothyroidism )     Information obtained from:Patient      Assessment/Treatment Plan:      Hypothyroidism secondary to Hashimoto thyroiditis-TSH has been historically elevated    Hypothyroid symptoms and elevated TSH at 18.69 recently indicate levothyroxine dose needs to be adjusted.  Current dose not adequate possibly from increased weight, problem with absorption [chronic intermittent diarrhea -see below].  We will increase the levothyroxine dose by 20%.  Increase levothyroxine from 150 mcg daily to 200 mcg daily 6 days a week and 200 mcg 1 day a week.  Take levothyroxine on empty stomach and wait at least 30 minutes before eating.  Do not take calcium or iron-containing supplements within 4 hours of taking levothyroxine tablet.  Please check follow-up TSH in 2 months.      Chronic intermittent diarrhea presumed to be secondary to irritable bowel syndrome-we will check for celiac disease and TTG to screen for celiac disease ordered today.  Follow-up with primary if persistent of symptoms.    Patient Instructions     Nickie,     Increase levothyroxine from 150 mcg daily to 200 mcg daily 6 days a week and 200 mcg 1 day a week.  Take levothyroxine on empty stomach and wait at least 30 minutes before eating.  Do not take calcium or iron-containing supplements within 4 hours of taking levothyroxine tablet.  Please check follow-up TSH in 2 months.    Screening test for celiac disease or gluten sensitivity is ordered as documented below.  We will contact you after the results.  Please call lab to schedule an appointment. Lab scheduling to schedule at any [facility] lab locations: [phone] select option 1    Orders Placed This Encounter   Procedures     TSH with free T4 reflex     Tissue transglutaminase aminta IgA and IgG     TSH with free T4 reflex           I will contact the patient with the test results.  Return to clinic  in 6 months.    Test and/or medications prescribed today:  Orders Placed This Encounter   Procedures     TSH with free T4 reflex     Tissue transglutaminase aminta IgA and IgG     TSH with free T4 reflex         Bennett Reynolds MD  Staff Endocrinologist    Division of Endocrinology and Diabetes      Subjective:         HPI: Nickie Garcia is a 41 year old female with history of hypothyroidism who is seen in consultation at [facility]'s request for the same.     Hypothyroidism diagnosed in her 20s.  Has been on levothyroxine since then.  Patient has been having fluctuating thyroid blood work as documented below particularly following delivery of her kids.  Patient had her third baby in August 2020 and following diet TSH was noted to be elevated at 13.89 and then recently 4 weeks ago 18.62.  Currently taking levothyroxine 150 mcg daily.  Takes levothyroxine on empty stomach and with at least 30 minutes before eating.  Does not take calcium or iron-containing supplements within 4 hours of taking levothyroxine tablet.    She has been having hair loss, not being able to sleep at night.  Occasional palpitations, weight gain, extreme fatigue,.  Currently she weighs about 95 kg or 210 pounds.  She does not miss her levothyroxine medication most of the time.  No history of celiac disease however patient endorses irritable bowel syndrome with frequent diarrhea.       Allergies   Allergen Reactions     Wellbutrin [Bupropion] Other (See Comments)     Optic neuritis       Current Outpatient Medications   Medication Sig Dispense Refill     amLODIPine (NORVASC) 5 MG tablet Take 1 tablet (5 mg) by mouth daily 90 tablet 1     ibuprofen (ADVIL/MOTRIN) 600 MG tablet Take 1 tablet (600 mg) by mouth every 6 hours as needed (for mild to moderate pain) 30 tablet 0     levonorgestrel (MIRENA, 52 MG,) 20 MCG/24HR IUD One device, intrauterine, for up to 5 years.       SYNTHROID 150 MCG tablet Take 1 tablet (150 mcg) by mouth  daily 90 tablet 0     SYNTHROID 200 MCG tablet Take 1 tablet (200 mcg) by mouth daily Six days a week and take half a tablet (100 mcg) one day a week. 90 tablet 3     varenicline (CHANTIX BEHZAD) 0.5 MG X 11 & 1 MG X 42 tablet Take 0.5 mg tab daily for 3 days, THEN 0.5 mg tab twice daily for 4 days, THEN 1 mg twice daily. 53 tablet 0     varenicline (CHANTIX) 1 MG tablet Take 1 tablet (1 mg) by mouth 2 times daily 30 tablet 1       Review of Systems     8 point review system is negative or is as per HPI above    Past Medical History:   Diagnosis Date     Anxiety disorder 2012     Class 1 obesity due to excess calories with serious comorbidity and body mass index (BMI) of 30.0 to 30.9 in adult 2019     Discogenic low back pain 2019     Essential hypertension 09/05/2019    borderline     History of abnormal cervical Pap smear 2017 4/13/18:  Pap NIL/HPV neg 2/2017: Pap LSIL HPV + (not 16/18)     Hypothyroid 2002     Kidney stone 2019     Multiple sclerosis (H) 2018    MS, relapsing, remitting:  Neurology.  Presented with optic neuritis.        Past Surgical History:   Procedure Laterality Date     HC COLP CERVIX/UPPER VAGINA  2017     HC DILATION/CURETTAGE DIAG/THER NON OB  2017    benign polyp     HC INSERTION INTRAUTERINE DEVICE  2012     HC REMOVE INTRAUTERINE DEVICE  2012     HC TOOTH EXTRACTION W/FORCEP       PE TUBES       TONSILLECTOMY & ADENOIDECTOMY         Family History   Problem Relation Age of Onset     Hypertension Father      Cerebrovascular Disease Father 54        hemorrhagic stroke d/t rupture of brain aneurysm     Aneurysm Father      Neurologic Disorder Mother         ms     No Known Problems Sister      Cancer Maternal Grandmother         brain     Multiple Sclerosis Maternal Grandfather      Heart Disease Paternal Grandmother         chf     Diabetes Paternal Grandfather      Diabetes Other      Cancer Paternal Uncle         cancer in eye and liver      Quit smoking.     Objective:   GENERAL:  Healthy, alert and no distress  EYES: Eyes grossly normal to inspection.  No discharge or erythema, or obvious scleral/conjunctival abnormalities.  RESP: No audible wheeze, cough, or visible cyanosis.    SKIN: Visible skin clear.   NEURO: Cranial nerves grossly intact.  Alert and oriented.   PSYCH: Mentation appears normal, affect normal/bright, judgement and insight intact, normal speech.    In House Labs:   Lab Results   Component Value Date    A1C 4.8 12/16/2019       TSH   Date Value Ref Range Status   04/11/2021 18.62 (H) 0.40 - 4.00 mU/L Final   11/20/2020 13.89 (H) 0.40 - 4.00 mU/L Final   07/29/2020 1.92 0.40 - 4.00 mU/L Final   05/04/2020 2.12 0.40 - 4.00 mU/L Final   03/14/2020 9.47 (H) 0.40 - 4.00 mU/L Final     T4 Free   Date Value Ref Range Status   03/14/2020 0.92 0.76 - 1.46 ng/dL Final   01/09/2020 0.89 0.76 - 1.46 ng/dL Final   12/16/2019 0.69 (L) 0.76 - 1.46 ng/dL Final   02/25/2019 0.65 (L) 0.76 - 1.46 ng/dL Final   08/15/2012 1.58 0.70 - 1.85 ng/dL Final       Creatinine   Date Value Ref Range Status   01/09/2020 0.64 0.52 - 1.04 mg/dL Final   ]  This note has been dictated using voice recognition software.  As a result, there may be errors in the documentation that have gone undetected.  Please consider this when interpreting information in this documentation.        Video-Visit Details    Type of service:  Video Visit  All times are in your local time  1st VideoStart: 05/04/2021 07:59 am  Stop: 05/04/2021 08:23 am  Originating Location (pt. Location): Home  Distant Location (provider location):  [facility]   Platform used for Video Visit: [employer]  42 minutes spent on the date of the encounter doing chart review, history and exam, documentation and further activities per the note

## 2021-05-04 NOTE — LETTER
5/4/2021         RE: Nickie Garcia  [address]  [facility] [address]        Dear Colleague,    Thank you for referring your patient, Nickie Garcia, to the [facility]. Please see a copy of my visit note below.    Nickie is a 41 year old who is being evaluated via a billable video visit.      How would you like to obtain your AVS? MyChart  If the video visit is dropped, the invitation should be resent by: Text to cell phone: [phone]  Will anyone else be joining your video visit? No      Video-Visit Details    Type of service:  Video Visit    Originating Location (pt. Location): Home    Distant Location (provider location):  [facility]     Platform used for Video Visit: Melissa Agudelo [facility]        Endocrinology virtual Visit    Chief Complaint: New Patient (Other specified hypothyroidism )     Information obtained from:Patient      Assessment/Treatment Plan:      Hypothyroidism secondary to Hashimoto thyroiditis-TSH has been historically elevated    Hypothyroid symptoms and elevated TSH at 18.69 recently indicate levothyroxine dose needs to be adjusted.  Current dose not adequate possibly from increased weight, problem with absorption [chronic intermittent diarrhea -see below].  We will increase the levothyroxine dose by 20%.  Increase levothyroxine from 150 mcg daily to 200 mcg daily 6 days a week and 200 mcg 1 day a week.  Take levothyroxine on empty stomach and wait at least 30 minutes before eating.  Do not take calcium or iron-containing supplements within 4 hours of taking levothyroxine tablet.  Please check follow-up TSH in 2 months.      Chronic intermittent diarrhea presumed to be secondary to irritable bowel syndrome-we will check for celiac disease and TTG to screen for celiac disease ordered today.  Follow-up with primary if persistent of symptoms.    Patient Instructions     Nickie,     Increase levothyroxine from 150 mcg  daily to 200 mcg daily 6 days a week and 200 mcg 1 day a week.  Take levothyroxine on empty stomach and wait at least 30 minutes before eating.  Do not take calcium or iron-containing supplements within 4 hours of taking levothyroxine tablet.  Please check follow-up TSH in 2 months.    Screening test for celiac disease or gluten sensitivity is ordered as documented below.  We will contact you after the results.  Please call lab to schedule an appointment. Lab scheduling to schedule at any [place] lab locations: [phone] select option 1    Orders Placed This Encounter   Procedures     TSH with free T4 reflex     Tissue transglutaminase aminta IgA and IgG     TSH with free T4 reflex           I will contact the patient with the test results.  Return to clinic in 6 months.    Test and/or medications prescribed today:  Orders Placed This Encounter   Procedures     TSH with free T4 reflex     Tissue transglutaminase aminta IgA and IgG     TSH with free T4 reflex         Bennett Reynolds MD  Staff Endocrinologist    Division of Endocrinology and Diabetes      Subjective:         HPI: Nickie Garcia is a 41 year old female with history of hypothyroidism who is seen in consultation at [facility]'s request for the same.     Hypothyroidism diagnosed in her 20s.  Has been on levothyroxine since then.  Patient has been having fluctuating thyroid blood work as documented below particularly following delivery of her kids.  Patient had her third baby in August 2020 and following diet TSH was noted to be elevated at 13.89 and then recently 4 weeks ago 18.62.  Currently taking levothyroxine 150 mcg daily.  Takes levothyroxine on empty stomach and with at least 30 minutes before eating.  Does not take calcium or iron-containing supplements within 4 hours of taking levothyroxine tablet.    She has been having hair loss, not being able to sleep at night.  Occasional palpitations, weight gain, extreme  fatigue,.  Currently she weighs about 95 kg or 210 pounds.  She does not miss her levothyroxine medication most of the time.  No history of celiac disease however patient endorses irritable bowel syndrome with frequent diarrhea.       Allergies   Allergen Reactions     Wellbutrin [Bupropion] Other (See Comments)     Optic neuritis       Current Outpatient Medications   Medication Sig Dispense Refill     amLODIPine (NORVASC) 5 MG tablet Take 1 tablet (5 mg) by mouth daily 90 tablet 1     ibuprofen (ADVIL/MOTRIN) 600 MG tablet Take 1 tablet (600 mg) by mouth every 6 hours as needed (for mild to moderate pain) 30 tablet 0     levonorgestrel (MIRENA, 52 MG,) 20 MCG/24HR IUD One device, intrauterine, for up to 5 years.       SYNTHROID 150 MCG tablet Take 1 tablet (150 mcg) by mouth daily 90 tablet 0     SYNTHROID 200 MCG tablet Take 1 tablet (200 mcg) by mouth daily Six days a week and take half a tablet (100 mcg) one day a week. 90 tablet 3     varenicline (CHANTIX BEHZAD) 0.5 MG X 11 & 1 MG X 42 tablet Take 0.5 mg tab daily for 3 days, THEN 0.5 mg tab twice daily for 4 days, THEN 1 mg twice daily. 53 tablet 0     varenicline (CHANTIX) 1 MG tablet Take 1 tablet (1 mg) by mouth 2 times daily 30 tablet 1       Review of Systems     8 point review system is negative or is as per HPI above    Past Medical History:   Diagnosis Date     Anxiety disorder 2012     Class 1 obesity due to excess calories with serious comorbidity and body mass index (BMI) of 30.0 to 30.9 in adult 2019     Discogenic low back pain 2019     Essential hypertension 09/05/2019    borderline     History of abnormal cervical Pap smear 2017 4/13/18:  Pap NIL/HPV neg 2/2017: Pap LSIL HPV + (not 16/18)     Hypothyroid 2002     Kidney stone 2019     Multiple sclerosis (H) 2018    MS, relapsing, remitting:  Neurology.  Presented with optic neuritis.        Past Surgical History:   Procedure Laterality Date      COLP CERVIX/UPPER VAGINA  2017       DILATION/CURETTAGE DIAG/THER NON OB  2017    benign polyp     HC INSERTION INTRAUTERINE DEVICE  2012     HC REMOVE INTRAUTERINE DEVICE  2012     HC TOOTH EXTRACTION W/FORCEP       PE TUBES       TONSILLECTOMY & ADENOIDECTOMY         Family History   Problem Relation Age of Onset     Hypertension Father      Cerebrovascular Disease Father 54        hemorrhagic stroke d/t rupture of brain aneurysm     Aneurysm Father      Neurologic Disorder Mother         ms     No Known Problems Sister      Cancer Maternal Grandmother         brain     Multiple Sclerosis Maternal Grandfather      Heart Disease Paternal Grandmother         chf     Diabetes Paternal Grandfather      Diabetes Other      Cancer Paternal Uncle         cancer in eye and liver      Quit smoking.     Objective:   GENERAL: Healthy, alert and no distress  EYES: Eyes grossly normal to inspection.  No discharge or erythema, or obvious scleral/conjunctival abnormalities.  RESP: No audible wheeze, cough, or visible cyanosis.    SKIN: Visible skin clear.   NEURO: Cranial nerves grossly intact.  Alert and oriented.   PSYCH: Mentation appears normal, affect normal/bright, judgement and insight intact, normal speech.    In House Labs:   Lab Results   Component Value Date    A1C 4.8 12/16/2019       TSH   Date Value Ref Range Status   04/11/2021 18.62 (H) 0.40 - 4.00 mU/L Final   11/20/2020 13.89 (H) 0.40 - 4.00 mU/L Final   07/29/2020 1.92 0.40 - 4.00 mU/L Final   05/04/2020 2.12 0.40 - 4.00 mU/L Final   03/14/2020 9.47 (H) 0.40 - 4.00 mU/L Final     T4 Free   Date Value Ref Range Status   03/14/2020 0.92 0.76 - 1.46 ng/dL Final   01/09/2020 0.89 0.76 - 1.46 ng/dL Final   12/16/2019 0.69 (L) 0.76 - 1.46 ng/dL Final   02/25/2019 0.65 (L) 0.76 - 1.46 ng/dL Final   08/15/2012 1.58 0.70 - 1.85 ng/dL Final       Creatinine   Date Value Ref Range Status   01/09/2020 0.64 0.52 - 1.04 mg/dL Final   ]  This note has been dictated using voice recognition software.  As a  result, there may be errors in the documentation that have gone undetected.  Please consider this when interpreting information in this documentation.        Video-Visit Details    Type of service:  Video Visit  All times are in your local time  1st VideoStart: 05/04/2021 07:59 am  Stop: 05/04/2021 08:23 am  Originating Location (pt. Location): Home  Distant Location (provider location):  [facility]   Platform used for Video Visit: [employer]  42 minutes spent on the date of the encounter doing chart review, history and exam, documentation and further activities per the note        Again, thank you for allowing me to participate in the care of your patient.        Sincerely,        Bennett Reynolds MD

## 2021-05-04 NOTE — PROGRESS NOTES
Nickie is a 41 year old who is being evaluated via a billable video visit.      How would you like to obtain your AVS? MyChart  If the video visit is dropped, the invitation should be resent by: Text to cell phone: [phone]  Will anyone else be joining your video visit? No      Video-Visit Details    Type of service:  Video Visit    Originating Location (pt. Location): Home    Distant Location (provider location):  [facility]     Platform used for Video Visit: Melissa Agudelo CMA

## 2021-05-04 NOTE — PATIENT INSTRUCTIONS
Nickie,     Increase levothyroxine from 150 mcg daily to 200 mcg daily 6 days a week and 200 mcg 1 day a week.  Take levothyroxine on empty stomach and wait at least 30 minutes before eating.  Do not take calcium or iron-containing supplements within 4 hours of taking levothyroxine tablet.  Please check follow-up TSH in 2 months.    Screening test for celiac disease or gluten sensitivity is ordered as documented below.  We will contact you after the results.  Please call lab to schedule an appointment. Lab scheduling to schedule at any [place] lab locations: [phone] select option 1    Orders Placed This Encounter   Procedures     TSH with free T4 reflex     Tissue transglutaminase aminta IgA and IgG     TSH with free T4 reflex

## 2021-05-05 LAB
TTG IGA SER-ACNC: <1 U/ML
TTG IGG SER-ACNC: 1 U/ML

## 2021-05-05 NOTE — RESULT ENCOUNTER NOTE
Nickie,    The TSH has improved but still high. Levothyroxine dose adjustment as discussed at the time of your visit.   We will contact you once we have the other pending test results.   Bennett Reynolds MD

## 2021-05-05 NOTE — RESULT ENCOUNTER NOTE
Nickie,    The screening test for gluten sensitivity or celiac disease was negative or normal.  Please let me know if any questions regarding this blood work.  Bennett Reynolds MD

## 2021-06-11 NOTE — TELEPHONE ENCOUNTER
6/11 2nd attempt.  Spoke with patient.  Patient stated that she was about to start a meeting and couldn't schedule anything at the moment.      Patient needs to schedule a 6 month follow up visit with Dr. Reynolds around 11/4/21.    Please assist patient in scheduling when she calls back.    Thanks    Brooklyn Barnes  Pediatric Specialty /Adult Endocrinology  [facility]

## 2021-08-09 NOTE — TELEPHONE ENCOUNTER
8/9 3rd attempt to schedule.  Chart letter created and sent.    Brooklyn Barnes  Pediatric Specialty /Adult Endocrinology  [facility]

## 2021-09-24 ENCOUNTER — OFFICE VISIT (OUTPATIENT)
Dept: FAMILY MEDICINE | Facility: CLINIC | Age: 41
End: 2021-09-24
Payer: COMMERCIAL

## 2021-09-24 VITALS
WEIGHT: 208 LBS | HEIGHT: 68 IN | BODY MASS INDEX: 31.52 KG/M2 | OXYGEN SATURATION: 100 % | HEART RATE: 91 BPM | SYSTOLIC BLOOD PRESSURE: 126 MMHG | DIASTOLIC BLOOD PRESSURE: 83 MMHG | TEMPERATURE: 98.7 F

## 2021-09-24 DIAGNOSIS — Z12.4 SCREENING FOR MALIGNANT NEOPLASM OF CERVIX: ICD-10-CM

## 2021-09-24 DIAGNOSIS — Z13.0 SCREENING FOR DISORDER OF BLOOD AND BLOOD-FORMING ORGANS: ICD-10-CM

## 2021-09-24 DIAGNOSIS — R10.2 PELVIC PAIN IN FEMALE: ICD-10-CM

## 2021-09-24 DIAGNOSIS — I10 ESSENTIAL HYPERTENSION: ICD-10-CM

## 2021-09-24 DIAGNOSIS — Z13.1 SCREENING FOR DIABETES MELLITUS (DM): ICD-10-CM

## 2021-09-24 DIAGNOSIS — E03.9 HYPOTHYROIDISM, UNSPECIFIED TYPE: ICD-10-CM

## 2021-09-24 DIAGNOSIS — Z00.00 ROUTINE GENERAL MEDICAL EXAMINATION AT A HEALTH CARE FACILITY: Primary | ICD-10-CM

## 2021-09-24 DIAGNOSIS — G35 MULTIPLE SCLEROSIS (H): ICD-10-CM

## 2021-09-24 DIAGNOSIS — Z13.6 CARDIOVASCULAR SCREENING; LDL GOAL LESS THAN 160: ICD-10-CM

## 2021-09-24 PROCEDURE — 99396 PREV VISIT EST AGE 40-64: CPT | Performed by: NURSE PRACTITIONER

## 2021-09-24 PROCEDURE — 99213 OFFICE O/P EST LOW 20 MIN: CPT | Mod: 25 | Performed by: NURSE PRACTITIONER

## 2021-09-24 PROCEDURE — 87624 HPV HI-RISK TYP POOLED RSLT: CPT | Performed by: NURSE PRACTITIONER

## 2021-09-24 PROCEDURE — G0145 SCR C/V CYTO,THINLAYER,RESCR: HCPCS | Performed by: NURSE PRACTITIONER

## 2021-09-24 RX ORDER — AMLODIPINE BESYLATE 5 MG/1
5 TABLET ORAL DAILY
Qty: 90 TABLET | Refills: 3 | Status: SHIPPED | OUTPATIENT
Start: 2021-09-24 | End: 2022-07-01

## 2021-09-24 ASSESSMENT — ENCOUNTER SYMPTOMS
SHORTNESS OF BREATH: 0
SORE THROAT: 0
CHILLS: 0
MYALGIAS: 0
NERVOUS/ANXIOUS: 0
DIARRHEA: 0
WEAKNESS: 0
JOINT SWELLING: 0
ABDOMINAL PAIN: 0
EYE PAIN: 0
NAUSEA: 0
PARESTHESIAS: 0
PALPITATIONS: 0
HEADACHES: 0
DIZZINESS: 0
HEMATURIA: 0
CONSTIPATION: 0
ARTHRALGIAS: 0
HEARTBURN: 0
FREQUENCY: 0
COUGH: 0
HEMATOCHEZIA: 0
DYSURIA: 0
FEVER: 0

## 2021-09-24 ASSESSMENT — MIFFLIN-ST. JEOR: SCORE: 1661.85

## 2021-09-24 NOTE — PATIENT INSTRUCTIONS
PLAN:   1.   Symptomatic therapy suggested: Continue current medications as prescribed.   Increase calcium to 1000mg and 1000iu Vit D  2.  Orders Placed This Encounter   Medications     amLODIPine (NORVASC) 5 MG tablet     Sig: Take 1 tablet (5 mg) by mouth daily     Dispense:  90 tablet     Refill:  3     Orders Placed This Encounter   Procedures     US Pelvic Complete with Transvaginal     Lipid panel reflex to direct LDL Fasting     CBC with platelets     Comprehensive metabolic panel     TSH     T4 free     Albumin Random Urine Quantitative with Creat Ratio       3. Patient needs to follow up in if no improvement,or sooner if worsening of symptoms or other symptoms develop.  FURTHER TESTING:       - pelvic  Ultrasound  I will place order. Please call [phone] to schedule.  FUTURE LABS:       - Schedule a fasting blood draw   Work on weight loss  Regular exercise  Will follow up and/or notify patient of  results via My Chart to determine further need for followup  Follow up office visit in one year for annual health maintenance exam, sooner PRN.    Preventive Health Recommendations  Female Ages 40 to 49    Yearly exam:     See your health care provider every year in order to  1. Review health changes.   2. Discuss preventive care.    3. Review your medicines if your doctor prescribed any.      Get a Pap test every three years (unless you have an abnormal result and your provider advises testing more often).      If you get Pap tests with HPV test, you only need to test every 5 years, unless you have an abnormal result. You do not need a Pap test if your uterus was removed (hysterectomy) and you have not had cancer.      You should be tested each year for STDs (sexually transmitted diseases), if you're at risk.     Ask your doctor if you should have a mammogram.      Have a colonoscopy (test for colon cancer) if someone in your family has had colon cancer or polyps before age 50.       Have a cholesterol test  every 5 years.       Have a diabetes test (fasting glucose) after age 45. If you are at risk for diabetes, you should have this test every 3 years.    Shots: Get a flu shot each year. Get a tetanus shot every 10 years.     Nutrition:     Eat at least 5 servings of fruits and vegetables each day.    Eat whole-grain bread, whole-wheat pasta and brown rice instead of white grains and rice.    Get adequate Calcium and Vitamin D.      Lifestyle    Exercise at least 150 minutes a week (an average of 30 minutes a day, 5 days a week). This will help you control your weight and prevent disease.    Limit alcohol to one drink per day.    No smoking.     Wear sunscreen to prevent skin cancer.    See your dentist every six months for an exam and cleaning.

## 2021-09-24 NOTE — PROGRESS NOTES
SUBJECTIVE:   CC: Nickie Garcia is an 41 year old woman who presents for preventive health visit.       Patient has been advised of split billing requirements and indicates understanding: Yes  Healthy Habits:     Getting at least 3 servings of Calcium per day:  Yes    Bi-annual eye exam:  Yes    Dental care twice a year:  NO    Sleep apnea or symptoms of sleep apnea:  Daytime drowsiness    Diet:  Regular (no restrictions)    Frequency of exercise:  None    Taking medications regularly:  No    Medication side effects:  None    PHQ-2 Total Score: 0    Additional concerns today:  Yes (Pelvic and back pain over the last week. )      Today's PHQ-2 Score:   PHQ-2 ( 1999 [employer]) 9/24/2021   Q1: Little interest or pleasure in doing things 0   Q2: Feeling down, depressed or hopeless 0   PHQ-2 Score 0   Q1: Little interest or pleasure in doing things Not at all   Q2: Feeling down, depressed or hopeless Not at all   PHQ-2 Score 0       Abuse: Current or Past (Physical, Sexual or Emotional) - No  Do you feel safe in your environment? Yes    Have you ever done Advance Care Planning? (For example, a Health Directive, POLST, or a discussion with a medical provider or your loved ones about your wishes): No, advance care planning information given to patient to review.  Patient declined advance care planning discussion at this time.    Social History     Tobacco Use     Smoking status: Current Every Day Smoker     Packs/day: 0.50     Smokeless tobacco: Never Used   Substance Use Topics     Alcohol use: No         Alcohol Use 9/24/2021   Prescreen: >3 drinks/day or >7 drinks/week? No   Prescreen: >3 drinks/day or >7 drinks/week? -   AUDIT SCORE  -       Reviewed orders with patient.  Reviewed health maintenance and updated orders accordingly - Yes  Lab work is in process  Labs reviewed in EPIC  BP Readings from Last 3 Encounters:   09/24/21 126/83   01/14/21 (!) 138/96   01/03/21 (!) 153/92    Wt Readings from Last 3  Encounters:   09/24/21 94.3 kg (208 lb)   01/14/21 94.3 kg (208 lb)   01/03/21 95.6 kg (210 lb 12.8 oz)                  Patient Active Problem List   Diagnosis     CARDIOVASCULAR SCREENING; LDL GOAL LESS THAN 160     Hashimotos thyroiditis     Vitamin D deficiency     Anxiety disorder     Multiple sclerosis (H)     History of abnormal cervical Pap smear     Essential hypertension     Antepartum multigravida of advanced maternal age     Discogenic low back pain     Kidney stone     Class 1 obesity due to excess calories with serious comorbidity and body mass index (BMI) of 30.0 to 30.9 in adult     Anemia     Delayed postpartum hemorrhage     Past Surgical History:   Procedure Laterality Date     HC COLP CERVIX/UPPER VAGINA  2017     HC DILATION/CURETTAGE DIAG/THER NON OB  2017    benign polyp     HC INSERTION INTRAUTERINE DEVICE  2012     HC REMOVE INTRAUTERINE DEVICE  2012     HC TOOTH EXTRACTION W/FORCEP       PE TUBES       TONSILLECTOMY & ADENOIDECTOMY         Social History     Tobacco Use     Smoking status: Current Every Day Smoker     Packs/day: 0.50     Smokeless tobacco: Never Used   Substance Use Topics     Alcohol use: No     Family History   Problem Relation Age of Onset     Hypertension Father      Cerebrovascular Disease Father 54        hemorrhagic stroke d/t rupture of brain aneurysm     Aneurysm Father      Neurologic Disorder Mother         ms     No Known Problems Sister      Cancer Maternal Grandmother         brain     Multiple Sclerosis Maternal Grandfather      Heart Disease Paternal Grandmother         chf     Diabetes Paternal Grandfather      Diabetes Other      Cancer Paternal Uncle         cancer in eye and liver          Current Outpatient Medications   Medication Sig Dispense Refill     amLODIPine (NORVASC) 5 MG tablet Take 1 tablet (5 mg) by mouth daily 90 tablet 3     ibuprofen (ADVIL/MOTRIN) 600 MG tablet Take 1 tablet (600 mg) by mouth every 6 hours as needed (for mild to moderate  pain) 30 tablet 0     levonorgestrel (MIRENA, 52 MG,) 20 MCG/24HR IUD One device, intrauterine, for up to 5 years.       SYNTHROID 200 MCG tablet Take 1 tablet (200 mcg) by mouth daily Six days a week and take half a tablet (100 mcg) one day a week. 90 tablet 3     Allergies   Allergen Reactions     Wellbutrin [Bupropion] Other (See Comments)     Optic neuritis       Breast Cancer Screening:  Any new diagnosis of family breast, ovarian, or bowel cancer? No      Mammogram Screening - Offered annual screening and updated Health Maintenance for mutual plan based on risk factor consideration    Pertinent mammograms are reviewed under the imaging tab.    History of abnormal Pap smear:   YES - updated in Problem List and Health Maintenance accordingly  Last 3 Pap and HPV Results:     PAP / HPV Latest Ref Rng & Units 9/24/2021 5/30/2012 11/6/2008   PAP   Negative for Intraepithelial Lesion or Malignancy (NILM) - -   PAP (Historical) - - NIL NIL   HPV16 Negative Negative - -   HPV18 Negative Negative - -   HRHPV Negative Negative - -     Reviewed and updated as needed this visit by clinical staff  Tobacco  Allergies  Meds   Med Hx  Surg Hx  Fam Hx  Soc Hx        Reviewed and updated as needed this visit by Provider                Past Medical History:   Diagnosis Date     Anxiety disorder 2012     Class 1 obesity due to excess calories with serious comorbidity and body mass index (BMI) of 30.0 to 30.9 in adult 2019     Discogenic low back pain 2019     Essential hypertension 09/05/2019    borderline     History of abnormal cervical Pap smear 2017 4/13/18:  Pap NIL/HPV neg 2/2017: Pap LSIL HPV + (not 16/18)     Hypothyroid 2002     Kidney stone 2019     Multiple sclerosis (H) 2018    MS, relapsing, remitting:  Neurology.  Presented with optic neuritis.       Past Surgical History:   Procedure Laterality Date     HC COLP CERVIX/UPPER VAGINA  2017     HC DILATION/CURETTAGE DIAG/THER NON OB  2017    benign polyp     HC  "INSERTION INTRAUTERINE DEVICE  2012      REMOVE INTRAUTERINE DEVICE  2012      TOOTH EXTRACTION W/FORCEP       PE TUBES       TONSILLECTOMY & ADENOIDECTOMY         Review of Systems   Constitutional: Negative for chills and fever.   HENT: Negative for congestion, ear pain, hearing loss and sore throat.    Eyes: Negative for pain and visual disturbance.   Respiratory: Negative for cough and shortness of breath.    Cardiovascular: Negative for chest pain, palpitations and peripheral edema.   Gastrointestinal: Negative for abdominal pain, constipation, diarrhea, heartburn, hematochezia and nausea.   Genitourinary: Negative for dysuria, frequency, genital sores, hematuria and urgency.        Having some pelvic discomfort in the last   Is mild but did have some constipation this week   Continues to have some pelvic pressure   Has had a hx of ovarian cysts  Has Mirena in place so minimal menses    Musculoskeletal: Negative for arthralgias, joint swelling and myalgias.   Skin: Negative for rash.   Neurological: Negative for dizziness, weakness, headaches and paresthesias.   Psychiatric/Behavioral: Negative for mood changes. The patient is not nervous/anxious.           OBJECTIVE:   /83   Pulse 91   Temp 98.7  F (37.1  C) (Tympanic)   Ht 1.735 m (5' 8.31\")   Wt 94.3 kg (208 lb)   LMP  (LMP Unknown)   SpO2 100%   BMI 31.34 kg/m    Physical Exam  GENERAL: healthy, alert and no distress  EYES: Eyes grossly normal to inspection and conjunctivae and sclerae normal  HENT: ear canals and TM's normal, nose and mouth without ulcers or lesions  NECK: no adenopathy, no asymmetry, masses, or scars and thyroid normal to palpation  RESP: lungs clear to auscultation - no rales, rhonchi or wheezes  BREAST: normal without masses, tenderness or nipple discharge and no palpable axillary masses or adenopathy  CV: regular rates and rhythm, no murmur, click or rub, peripheral pulses strong and no peripheral edema  ABDOMEN: soft, " nontender, no hepatosplenomegaly, no masses and bowel sounds normal   (female): normal female external genitalia, normal urethral meatus, vaginal mucosa pink, moist, well rugated, and normal cervix/adnexa/uterus without masses or discharge  MS: no gross musculoskeletal defects noted, no edema  SKIN: no suspicious lesions or rashes  NEURO: Normal strength and tone, mentation intact and speech normal  PSYCH: mentation appears normal, affect normal/bright  LYMPH: no cervical, supraclavicular, axillary, or inguinal adenopathy    Diagnostic Test Results:  Labs reviewed in Epic  Results for orders placed or performed in visit on 09/24/21   HPV High Risk Types DNA Cervical     Status: None   Result Value Ref Range    Other HR HPV Negative Negative    HPV16 DNA Negative Negative    HPV18 DNA Negative Negative    FINAL DIAGNOSIS       This patient's sample is negative for HPV DNA.        This test was developed and its performance characteristics determined by the [facility]. It has not been cleared or approved by the FDA. The laboratory is regulated under CLIA as qualified to perform high-complexity testing. This test is used for clinical purposes. It should not be regarded as investigational or for research.    METHODOLOGY: The Roche Darline 4800 system uses automated extraction, simultaneous amplification of HPV (L1 region) and beta-globin, followed by real time detection of fluorescent labeled HPV and beta globin using specific oligonucleotide probes. The test specifically identified types HPV 16 DNA and HPV 18 DNA while concurrently detecting the rest of the high risk types (31, 33, 35, 39, 45, 51, 52, 56, 58, 59, 66 or 68).    COMMENTS: This test is not intended for use as a screening device for woman under age 30 with normal cervical cytology. Results should be correlated with cytologic and histologic findings. Close clinical followup is recommended.        Pap screen with HPV - recommended age 30 - 65 years     Status: None   Result Value Ref Range    Interpretation        Negative for Intraepithelial Lesion or Malignancy (NILM)    Specimen Adequacy       Satisfactory for evaluation, endocervical/transformation zone component present    Clinical Information       none      HPV Reflex Yes regardless of result     Previous Abnormal?       Yes      Previous Abnormal Diagnosis       postive for HPV      Performing Labs       The technical component of this testing was completed at [facility]         ASSESSMENT/PLAN:   Nickie was seen today for physical.    Diagnoses and all orders for this visit:    Routine general medical examination at a health care facility    CARDIOVASCULAR SCREENING; LDL GOAL LESS THAN 160  -     Lipid panel reflex to direct LDL Fasting; Future    Screening for diabetes mellitus (DM)  -     Comprehensive metabolic panel; Future    Screening for disorder of blood and blood-forming organs  -     CBC with platelets; Future    Essential hypertension  -     amLODIPine (NORVASC) 5 MG tablet; Take 1 tablet (5 mg) by mouth daily  -     Albumin Random Urine Quantitative with Creat Ratio; Future  The current medical regimen is effective.  Continue current medication regimen unchanged.    Hypothyroidism, unspecified type  -     TSH; Future  -     T4 free; Future    Screening for malignant neoplasm of cervix  -     Pap screen with HPV - recommended age 30 - 65 years  -     HPV Hold (Lab Only)  -     HPV High Risk Types DNA Cervical    Multiple sclerosis (H)  FOLLOW UP WITH SPECIALIST :Neurology    Pelvic pain in female  -     US Pelvic Complete with Transvaginal; Future    PLAN:   Patient needs to follow up in if no improvement,or sooner if worsening of symptoms or other symptoms develop.  FURTHER TESTING:       - pelvic  Ultrasound  I will place order. Please call [phone] to schedule.  FUTURE  "LABS:       - Schedule a fasting blood draw   Work on weight loss  Regular exercise  Will follow up and/or notify patient of  results via My Chart to determine further need for followup  Follow up office visit in one year for annual health maintenance exam, sooner PRN.    Patient has been advised of split billing requirements and indicates understanding: Yes  COUNSELING:  Reviewed preventive health counseling, as reflected in patient instructions  Special attention given to:        Regular exercise       Healthy diet/nutrition       Vision screening       Osteoporosis prevention/bone health       Consider Hep C screening for all patients one time for ages 18-79 years       The 10-year ASCVD risk score (Denise FAYE Jr., et al., 2013) is: 4.2%    Values used to calculate the score:      Age: 41 years      Sex: Female      Is Non- : No      Diabetic: No      Tobacco smoker: Yes      Systolic Blood Pressure: 126 mmHg      Is BP treated: Yes      HDL Cholesterol: 49 mg/dL      Total Cholesterol: 206 mg/dL    Estimated body mass index is 31.34 kg/m  as calculated from the following:    Height as of this encounter: 1.735 m (5' 8.31\").    Weight as of this encounter: 94.3 kg (208 lb).    Weight management plan: Discussed healthy diet and exercise guidelines    She reports that she has been smoking. She has been smoking about 0.50 packs per day. She has never used smokeless tobacco.  Tobacco Cessation Action Plan:   Information offered: Patient not interested at this time      Counseling Resources:  ATP IV Guidelines  Pooled Cohorts Equation Calculator  Breast Cancer Risk Calculator  BRCA-Related Cancer Risk Assessment: FHS-7 Tool  FRAX Risk Assessment  ICSI Preventive Guidelines  Dietary Guidelines for Americans, 2010  USDA's MyPlate  ASA Prophylaxis  Lung CA Screening    JUNO Knowles [facility]  "

## 2021-09-25 ENCOUNTER — HEALTH MAINTENANCE LETTER (OUTPATIENT)
Age: 41
End: 2021-09-25

## 2021-09-29 LAB
BKR LAB AP GYN ADEQUACY: NORMAL
BKR LAB AP GYN INTERPRETATION: NORMAL
BKR LAB AP HPV REFLEX: NORMAL
BKR LAB AP PREVIOUS ABNL DX: NORMAL
BKR LAB AP PREVIOUS ABNORMAL: NORMAL
PATH REPORT.COMMENTS IMP SPEC: NORMAL
PATH REPORT.RELEVANT HX SPEC: NORMAL

## 2021-10-01 LAB
HUMAN PAPILLOMA VIRUS 16 DNA: NEGATIVE
HUMAN PAPILLOMA VIRUS 18 DNA: NEGATIVE
HUMAN PAPILLOMA VIRUS FINAL DIAGNOSIS: NORMAL
HUMAN PAPILLOMA VIRUS OTHER HR: NEGATIVE

## 2021-10-22 ENCOUNTER — E-VISIT (OUTPATIENT)
Dept: FAMILY MEDICINE | Facility: CLINIC | Age: 41
End: 2021-10-22
Payer: COMMERCIAL

## 2021-10-22 DIAGNOSIS — R19.7 DIARRHEA, UNSPECIFIED TYPE: Primary | ICD-10-CM

## 2021-10-22 PROCEDURE — 99421 OL DIG E/M SVC 5-10 MIN: CPT | Performed by: NURSE PRACTITIONER

## 2021-10-22 NOTE — PATIENT INSTRUCTIONS
Patient Education     Diarrhea with Uncertain Cause (Adult)    Diarrhea is when stools are loose and watery. This can be caused by:    Viral infections    Bacterial infections    Food poisoning    Parasites    Irritable bowel syndrome (IBS)    Inflammatory bowel diseases such as ulcerative colitis, Crohn's disease, and celiac disease    Food intolerance, such as to lactose, the sugar found in milk and milk products    Reaction to medicines like antibiotics, laxatives, cancer drugs, and antacids  Along with diarrhea, you may also have:    Abdominal pain and cramping    Nausea and vomiting    Loss of bowel control    Fever and chills    Bloody stools  In some cases, antibiotics may help to treat diarrhea. You may have a stool sample test. This is done to see what is causing your diarrhea, and if antibiotics will help treat it. The results of a stool sample test may take up to 2 days. The healthcare provider may not give you antibiotics until he or she has the stool test results.  Diarrhea can cause dehydration. This is the loss of too much water and other fluids from the body. When this occurs, body fluid must be replaced. This can be done with oral rehydration solutions. Oral rehydration solutions are available at drugstores and grocery stores without a prescription. Sports drinks are not the best choice if you are very dehydrated. They have too much sugar and not enough electrolytes.  Home care  Follow all instructions given by your healthcare provider. Rest at home for the next 24 hours, or until you feel better. Avoid caffeine, tobacco, and alcohol. These can make diarrhea, cramping, and pain worse.  If taking medicines:    Over-the-counter nausea and diarrhea medicines are generally OK unless you experience fever or blood stool. Check with your doctor first in those circumstances.    You may use acetaminophen or NSAID medicines like ibuprofen or naproxen to reduce pain and fever. Don t use these if you have  chronic liver or kidney disease, or ever had a stomach ulcer or gastrointestinal bleeding. Don't use NSAID medicines if you are already taking one for another condition (like arthritis) or are on daily aspirin therapy (such as for heart disease or after a stroke). Talk with your healthcare provider first.    If antibiotics were prescribed, be sure you take them until they are finished. Don t stop taking them even when you feel better. Antibiotics must be taken as a full course.  To prevent the spread of illness:    Remember that washing with soap and water and using alcohol-based  is the best way to prevent the spread of infection. Dry your hands with a single use towel (like a paper towel).    Clean the toilet after each use.    Wash your hands before eating.    Wash your hands before and after preparing food. Keep in mind that people with diarrhea or vomiting should not prepare food for others.    Wash your hands after using cutting boards, countertops, and knives that have been in contact with raw foods.    Wash and then peel fruits and vegetables.    Keep uncooked meats away from cooked and ready-to-eat foods.    Use a food thermometer when cooking. Cook poultry to at least 165 F (74 C). Cook ground meat (beef, veal, pork, lamb) to at least 160 F (71 C). Cook fresh beef, veal, lamb, and pork to at least 145 F (63 C).    Don t eat raw or undercooked eggs (poached or emily side up), poultry, meat, or unpasteurized milk and juices.  Food and drinks  The main goal while treating vomiting or diarrhea is to prevent dehydration. This is done by taking small amounts of liquids often.    Keep in mind that liquids are more important than food right now.    Drink only small amounts of liquids at a time.    Don t force yourself to eat, especially if you are having cramping, vomiting, or diarrhea. Don t eat large amounts at a time, even if you are hungry.    If you eat, avoid fatty, greasy, spicy, or fried  foods.    Don t eat dairy foods or drink milk if you have diarrhea. These can make diarrhea worse.  During the first 24 hours you can try:    Oral rehydration solutions.  Sports drinks may be used if you are not too dehydrated and are otherwise healthy.    Soft drinks without caffeine    Ginger ale    Water (plain or flavored)    Decaf tea or coffee    Clear broth, consommé, or bouillon    Gelatin, popsicles, or frozen fruit juice bars  The second 24 hours, if you are feeling better, you can add:    Hot cereal, plain toast, bread, rolls, or crackers    Plain noodles, rice, mashed potatoes, chicken noodle soup, or rice soup    Unsweetened canned fruit (no pineapple)    Bananas  As you recover:    Limit fat intake to less than 15 grams per day. Don t eat margarine, butter, oils, mayonnaise, sauces, gravies, fried foods, peanut butter, meat, poultry, or fish.    Limit fiber. Don t eat raw or cooked vegetables, fresh fruits except bananas, or bran cereals.    Limit caffeine and chocolate.    Limit dairy.    Don t use spices or seasonings except salt.    Go back to your normal diet over time, as you feel better and your symptoms improve.    If the symptoms come back, go back to a simple diet or clear liquids.  Follow-up care  Follow up with your healthcare provider, or as advised. If a stool sample was taken or cultures were done, call the healthcare provider for the results as instructed.  Call 911  Call 911 if you have any of these symptoms:    Trouble breathing    Confusion    Extreme drowsiness or trouble walking    Loss of consciousness    Rapid heart rate    Chest pain    Stiff neck    Seizure  When to seek medical advice  Call your healthcare provider right away if any of these occur:    Abdominal pain that gets worse    Constant lower right abdominal pain    Continued vomiting and inability to keep liquids down    Diarrhea more than 5 times a day    Blood in vomit or stool    Dark urine or no urine for 8 hours,  dry mouth and tongue, tiredness, weakness, or dizziness    Drowsiness    New rash    You don t get better in 2 to 3 days    Fever of 100.4 F (38 C) or higher, or as directed by your healthcare provider  Nandini last reviewed this educational content on 6/1/2018 2000-2021 [employer]. All rights reserved. This information is not intended as a substitute for professional medical care. Always follow your healthcare professional's instructions.             Diarrhea with Uncertain Cause (Adult)    Diarrhea is when stools are loose and watery. This can be caused by:    Viral infections    Bacterial infections    Food poisoning    Parasites    Irritable bowel syndrome (IBS)    Inflammatory bowel diseases such as ulcerative colitis, Crohn's disease, and celiac disease    Food intolerance, such as to lactose, the sugar found in milk and milk products    Reaction to medicines like antibiotics, laxatives, cancer drugs, and antacids  Along with diarrhea, you may also have:    Abdominal pain and cramping    Nausea and vomiting    Loss of bowel control    Fever and chills    Bloody stools  In some cases, antibiotics may help to treat diarrhea. You may have a stool sample test. This is done to see what is causing your diarrhea, and if antibiotics will help treat it. The results of a stool sample test may take up to 2 days. The healthcare provider may not give you antibiotics until he or she has the stool test results.  Diarrhea can cause dehydration. This is the loss of too much water and other fluids from the body. When this occurs, body fluid must be replaced. This can be done with oral rehydration solutions. Oral rehydration solutions are available at drugstores and grocery stores without a prescription. Sports drinks are not the best choice if you are very dehydrated. They have too much sugar and not enough electrolytes.  Home care  Follow all instructions given by your healthcare provider. Rest at home for  the next 24 hours, or until you feel better. Avoid caffeine, tobacco, and alcohol. These can make diarrhea, cramping, and pain worse.  If taking medicines:    Over-the-counter nausea and diarrhea medicines are generally OK unless you experience fever or blood stool. Check with your doctor first in those circumstances.    You may use acetaminophen or NSAID medicines like ibuprofen or naproxen to reduce pain and fever. Don t use these if you have chronic liver or kidney disease, or ever had a stomach ulcer or gastrointestinal bleeding. Don't use NSAID medicines if you are already taking one for another condition (like arthritis) or are on daily aspirin therapy (such as for heart disease or after a stroke). Talk with your healthcare provider first.    If antibiotics were prescribed, be sure you take them until they are finished. Don t stop taking them even when you feel better. Antibiotics must be taken as a full course.  To prevent the spread of illness:    Remember that washing with soap and water and using alcohol-based  is the best way to prevent the spread of infection. Dry your hands with a single use towel (like a paper towel).    Clean the toilet after each use.    Wash your hands before eating.    Wash your hands before and after preparing food. Keep in mind that people with diarrhea or vomiting should not prepare food for others.    Wash your hands after using cutting boards, countertops, and knives that have been in contact with raw foods.    Wash and then peel fruits and vegetables.    Keep uncooked meats away from cooked and ready-to-eat foods.    Use a food thermometer when cooking. Cook poultry to at least 165 F (74 C). Cook ground meat (beef, veal, pork, lamb) to at least 160 F (71 C). Cook fresh beef, veal, lamb, and pork to at least 145 F (63 C).    Don t eat raw or undercooked eggs (poached or emily side up), poultry, meat, or unpasteurized milk and juices.  Food and drinks  The main goal  while treating vomiting or diarrhea is to prevent dehydration. This is done by taking small amounts of liquids often.    Keep in mind that liquids are more important than food right now.    Drink only small amounts of liquids at a time.    Don t force yourself to eat, especially if you are having cramping, vomiting, or diarrhea. Don t eat large amounts at a time, even if you are hungry.    If you eat, avoid fatty, greasy, spicy, or fried foods.    Don t eat dairy foods or drink milk if you have diarrhea. These can make diarrhea worse.  During the first 24 hours you can try:    Oral rehydration solutions.  Sports drinks may be used if you are not too dehydrated and are otherwise healthy.    Soft drinks without caffeine    Ginger ale    Water (plain or flavored)    Decaf tea or coffee    Clear broth, consommé, or bouillon    Gelatin, popsicles, or frozen fruit juice bars  The second 24 hours, if you are feeling better, you can add:    Hot cereal, plain toast, bread, rolls, or crackers    Plain noodles, rice, mashed potatoes, chicken noodle soup, or rice soup    Unsweetened canned fruit (no pineapple)    Bananas  As you recover:    Limit fat intake to less than 15 grams per day. Don t eat margarine, butter, oils, mayonnaise, sauces, gravies, fried foods, peanut butter, meat, poultry, or fish.    Limit fiber. Don t eat raw or cooked vegetables, fresh fruits except bananas, or bran cereals.    Limit caffeine and chocolate.    Limit dairy.    Don t use spices or seasonings except salt.    Go back to your normal diet over time, as you feel better and your symptoms improve.    If the symptoms come back, go back to a simple diet or clear liquids.  Follow-up care  Follow up with your healthcare provider, or as advised. If a stool sample was taken or cultures were done, call the healthcare provider for the results as instructed.  Call 911  Call 911 if you have any of these symptoms:    Trouble breathing    Confusion    Extreme  drowsiness or trouble walking    Loss of consciousness    Rapid heart rate    Chest pain    Stiff neck    Seizure  When to seek medical advice  Call your healthcare provider right away if any of these occur:    Abdominal pain that gets worse    Constant lower right abdominal pain    Continued vomiting and inability to keep liquids down    Diarrhea more than 5 times a day    Blood in vomit or stool    Dark urine or no urine for 8 hours, dry mouth and tongue, tiredness, weakness, or dizziness    Drowsiness    New rash    You don t get better in 2 to 3 days    Fever of 100.4 F (38 C) or higher, or as directed by your healthcare provider  Nandini last reviewed this educational content on 6/1/2018 2000-2021 [employer]. All rights reserved. This information is not intended as a substitute for professional medical care. Always follow your healthcare professional's instructions.

## 2021-11-04 ENCOUNTER — VIRTUAL VISIT (OUTPATIENT)
Dept: NEUROLOGY | Facility: CLINIC | Age: 41
End: 2021-11-04
Attending: PSYCHIATRY & NEUROLOGY
Payer: COMMERCIAL

## 2021-11-04 DIAGNOSIS — G35 MULTIPLE SCLEROSIS (H): Primary | ICD-10-CM

## 2021-11-04 PROCEDURE — 99214 OFFICE O/P EST MOD 30 MIN: CPT | Mod: 95 | Performed by: PSYCHIATRY & NEUROLOGY

## 2021-11-04 RX ORDER — PREDNISONE 50 MG/1
1000 TABLET ORAL DAILY
Qty: 60 TABLET | Refills: 0 | Status: SHIPPED | OUTPATIENT
Start: 2021-11-04 | End: 2021-11-07

## 2021-11-04 NOTE — PATIENT INSTRUCTIONS
Impression:    Relapsing remitting multiple sclerosis, off Copaxone since 2018, status post pregnancy from November 2019 to August 2020  without complications.  Currently experiencing a mild sensory exacerbation.    Plan:    Obtain MRI of the brain cervical and thoracic spine with and without contrast to confirm disease activity from the imaging standpoint and rebaseline.  Start prednisone at 1000 mg in the form of 20, (50 mg tablets) a day for the next 3 days.    Restart Copaxone, alternatively consider Aubagio 14 mg a day.  Efficacy, side effects and monitoring needs were discussed in some detail and will be discussed further after she reviews the information provided.    Return to clinic in 4 months.

## 2021-11-04 NOTE — LETTER
2021       RE: Nickie Garcia  [address]  [place] [address]     Dear Colleague,    Thank you for referring your patient, Nickie Garcia, to the [facility] [place] at [facility]. Please see a copy of my visit note below.    Nickie is a 41 year old who is being evaluated via a billable video visit.      How would you like to obtain your AVS? MyChart  If the video visit is dropped, the invitation should be resent by: Other e-mail: [employer]  Will anyone else be joining your video visit? No      Video Start Time: 3:03 PM  Video-Visit Details    Type of service:  Video Visit    Video End Time:3:32 PM    Originating Location (pt. Location): Home    Distant Location (provider location):  [facility] [place]     Platform used for Video Visit: Garmentory  THE [facility]  FOLLOW UP VISIT           PRINCIPAL NEUROLOGIC DIAGNOSIS: Multiple Sclerosis        HISTORY OF ILLNESS:    This is a follow visit for this 41 year old right handed genetic female  With a history of MS. Who was last seen on  10-17-19.   At that time the patient was recommended to:    ASSESSMENT:     RRMS clinically stable off Copaxone. Fatigue an issue.     PLAN:     Repeat MRI's B, C and T spine w/wo contrast at [facility]     Re-start Copaxone, we will complete form today     Start Vit D3 5000 international unit(s) a day     Omega 3 FA     Multivitamin     MRA will be obtained since she has a strong FH of aneurysms.     Since last visit she has been doing well and became pregnant in 2019, giving birth to a healthy boy in 2020.  I suspect that this was a very stressful time not only due to the pandemic but also because the baby was induced and eventually she had to undergo a .  She reports that the baby is currently 15 months and is he is very difficult in  terms of still not sleeping through the night, very dependent on mom's attention and making his situation somewhat tense.    She reports no symptoms in the postpartum but approximately 2 days ago she started developing numbness in the feet and shins this seems to be getting worse, she also noticed some leg heaviness this morning.    She has been off Copaxone since 2018.    She had COVID-19 in August of this year everyone in the family had it but not the baby, she underwent monoclonal antibodies as a treatment.    She would like to restart Copaxone and is wondering if she needs IV steroids.  Today I explained that in general if the symptoms are mild we do not really treat sensory symptoms with IV steroids but it all depends on how they behave over the next couple of days.  Additionally will be important for her to undergo an MRI of the brain cervical and thoracic spine as previously recommended neurology to be baseline prior to starting Copaxone but also to see if there is any inflammatory activity they would require steroids.    We also spoke about Aubagio, efficacy side effects and some monitoring needs were discussed in detail and she is willing to review the information that will be mailed to her.    We also discussed the importance of understanding how stressful pregnancy and delivery were for her but also for the baby and to spend some time improving the relationship and decreasing the tension between them.    She works at [employer] and was encouraged to consider seeing at rest mostly for convenience, I will be happy to continue to see her at [facility] if transfer she prefers.      Current Outpatient Prescriptions:  Current Outpatient Medications   Medication     amLODIPine (NORVASC) 5 MG tablet     ibuprofen (ADVIL/MOTRIN) 600 MG tablet     levonorgestrel (MIRENA, 52 MG,) 20 MCG/24HR IUD     SYNTHROID 200 MCG tablet     No current facility-administered medications for this visit.          ALLERGIES        Allergies   Allergen Reactions     Wellbutrin [Bupropion] Other (See Comments)     Optic neuritis           Impression:    Relapsing remitting multiple sclerosis, off Copaxone since 2018, status post pregnancy from November 2019 to August 2020  without complications.  Currently experiencing a mild sensory exacerbation.    Plan:    Obtain MRI of the brain cervical and thoracic spine with and without contrast to confirm disease activity from the imaging standpoint and rebaseline.  Start prednisone at 1000 mg in the form of 20, (50 mg tablets) a day for the next 3 days.    Restart Copaxone, alternatively consider Aubagio 14 mg a day.  Efficacy, side effects and monitoring needs were discussed in some detail and will be discussed further after she reviews the information provided.        Finally I will follow the patient up in 4 month(s) as long as the patient is doing well. I instructed the patient to call or mychart my office with any concerns or questions.    I spent 30 minutes in this visit, with >50% direct patient time spent counseling about prognosis, treatment options, and coordination of care.     My recommendations will be communicated back to the patient's physician(s) by mail.  Follow-up is expected to be with me.      Liz Franco MD  Chief, Multiple Sclerosis Division  Department of Neurology  [facility]      BILLING TIME DOCUMENTATION:   The total TIME spent on this patient on the date of the encounter/appointment was 30 minutes.       TOTAL TIME includes:   Time spent preparing to see the patient (reviewing records and tests)   Time spent face to face (or over the phone) with the patient   Time spent ordering tests, medications, procedures and referrals  Time spent documenting clinical information in Epic  Time discussing the case with other providers         Again, thank you for allowing me to participate in the care of your patient.      Sincerely,    Liz HARRY  MD [place]

## 2021-11-05 ENCOUNTER — ANCILLARY PROCEDURE (OUTPATIENT)
Dept: MRI IMAGING | Facility: CLINIC | Age: 41
End: 2021-11-05
Attending: PSYCHIATRY & NEUROLOGY
Payer: COMMERCIAL

## 2021-11-05 DIAGNOSIS — G35 MULTIPLE SCLEROSIS (H): ICD-10-CM

## 2021-11-05 PROCEDURE — A9585 GADOBUTROL INJECTION: HCPCS | Performed by: STUDENT IN AN ORGANIZED HEALTH CARE EDUCATION/TRAINING PROGRAM

## 2021-11-05 PROCEDURE — 72156 MRI NECK SPINE W/O & W/DYE: CPT | Performed by: STUDENT IN AN ORGANIZED HEALTH CARE EDUCATION/TRAINING PROGRAM

## 2021-11-05 PROCEDURE — 70553 MRI BRAIN STEM W/O & W/DYE: CPT | Performed by: STUDENT IN AN ORGANIZED HEALTH CARE EDUCATION/TRAINING PROGRAM

## 2021-11-05 RX ORDER — GADOBUTROL 604.72 MG/ML
10 INJECTION INTRAVENOUS ONCE
Status: COMPLETED | OUTPATIENT
Start: 2021-11-05 | End: 2021-11-05

## 2021-11-05 RX ADMIN — GADOBUTROL 10 ML: 604.72 INJECTION INTRAVENOUS at 15:55

## 2021-11-05 NOTE — DISCHARGE INSTRUCTIONS
MRI Contrast Discharge Instructions    The IV contrast you received today will pass out of your body in your  urine. This will happen in the next 24 hours. You will not feel this process.  Your urine will not change color.    Drink at least 4 extra glasses of water or juice today (unless your doctor  has restricted your fluids). This reduces the stress on your kidneys.  You may take your regular medicines.    If you are on dialysis: It is best to have dialysis today.    If you have a reaction: Most reactions happen right away. If you have  any new symptoms after leaving the hospital (such as hives or swelling),  call your hospital at the correct number below. Or call your family doctor.  If you have breathing distress or wheezing, call 911.    Special instructions: ***    I have read and understand the above information.    Signature:______________________________________ Date:___________    Staff:__________________________________________ Date:___________     Time:__________    [facility] Departments:    ___[place]: [phone]  ___[facility]: [phone]  ___[place]: [phone] ___[facility]: [phone]  ___[facility]: [phone]  ___[facility]: [phone]  ___[phone]  ___[facility]: [phone]  ___[phone]

## 2021-11-05 NOTE — DISCHARGE INSTRUCTIONS
MRI Contrast Discharge Instructions    The IV contrast you received today will pass out of your body in your  urine. This will happen in the next 24 hours. You will not feel this process.  Your urine will not change color.    Drink at least 4 extra glasses of water or juice today (unless your doctor  has restricted your fluids). This reduces the stress on your kidneys.  You may take your regular medicines.    If you are on dialysis: It is best to have dialysis today.    If you have a reaction: Most reactions happen right away. If you have  any new symptoms after leaving the hospital (such as hives or swelling),  call your hospital at the correct number below. Or call your family doctor.  If you have breathing distress or wheezing, call 911.    Special instructions: ***    I have read and understand the above information.    Signature:______________________________________ Date:___________    Staff:__________________________________________ Date:___________     Time:__________    [place] [facility] Departments:    ___[place]: [phone]  ___[facility]: [phone]  ___[place]: [phone] ___[facility]: [phone]  ___[facility]: [phone]  ___[facility]: [phone]  ___[phone]  ___[facility]: [phone]  ___[phone]

## 2021-11-09 ENCOUNTER — LAB (OUTPATIENT)
Dept: LAB | Facility: CLINIC | Age: 41
End: 2021-11-09
Payer: COMMERCIAL

## 2021-11-09 DIAGNOSIS — E03.9 HYPOTHYROIDISM, UNSPECIFIED TYPE: ICD-10-CM

## 2021-11-09 DIAGNOSIS — Z13.0 SCREENING FOR DISORDER OF BLOOD AND BLOOD-FORMING ORGANS: ICD-10-CM

## 2021-11-09 DIAGNOSIS — Z13.1 SCREENING FOR DIABETES MELLITUS (DM): ICD-10-CM

## 2021-11-09 DIAGNOSIS — I10 ESSENTIAL HYPERTENSION: ICD-10-CM

## 2021-11-09 DIAGNOSIS — Z13.6 CARDIOVASCULAR SCREENING; LDL GOAL LESS THAN 160: ICD-10-CM

## 2021-11-09 DIAGNOSIS — R79.89 ELEVATED LFTS: ICD-10-CM

## 2021-11-09 LAB
ALBUMIN SERPL-MCNC: 4 G/DL (ref 3.4–5)
ALP SERPL-CCNC: 94 U/L (ref 40–150)
ALT SERPL W P-5'-P-CCNC: 166 U/L (ref 0–50)
ANION GAP SERPL CALCULATED.3IONS-SCNC: 4 MMOL/L (ref 3–14)
AST SERPL W P-5'-P-CCNC: 168 U/L (ref 0–45)
BILIRUB SERPL-MCNC: 0.4 MG/DL (ref 0.2–1.3)
BUN SERPL-MCNC: 14 MG/DL (ref 7–30)
CALCIUM SERPL-MCNC: 8.6 MG/DL (ref 8.5–10.1)
CHLORIDE BLD-SCNC: 107 MMOL/L (ref 94–109)
CHOLEST SERPL-MCNC: 218 MG/DL
CO2 SERPL-SCNC: 27 MMOL/L (ref 20–32)
CREAT SERPL-MCNC: 0.8 MG/DL (ref 0.52–1.04)
CREAT UR-MCNC: 66 MG/DL
ERYTHROCYTE [DISTWIDTH] IN BLOOD BY AUTOMATED COUNT: 11.8 % (ref 10–15)
FASTING STATUS PATIENT QL REPORTED: YES
GFR SERPL CREATININE-BSD FRML MDRD: >90 ML/MIN/1.73M2
GLUCOSE BLD-MCNC: 90 MG/DL (ref 70–99)
HCT VFR BLD AUTO: 44 % (ref 35–47)
HDLC SERPL-MCNC: 55 MG/DL
HGB BLD-MCNC: 14.7 G/DL (ref 11.7–15.7)
LDLC SERPL CALC-MCNC: 98 MG/DL
MCH RBC QN AUTO: 32.6 PG (ref 26.5–33)
MCHC RBC AUTO-ENTMCNC: 33.4 G/DL (ref 31.5–36.5)
MCV RBC AUTO: 98 FL (ref 78–100)
MICROALBUMIN UR-MCNC: 7 MG/L
MICROALBUMIN/CREAT UR: 10.61 MG/G CR (ref 0–25)
NONHDLC SERPL-MCNC: 163 MG/DL
PLATELET # BLD AUTO: 259 10E3/UL (ref 150–450)
POTASSIUM BLD-SCNC: 3.1 MMOL/L (ref 3.4–5.3)
PROT SERPL-MCNC: 7.7 G/DL (ref 6.8–8.8)
RBC # BLD AUTO: 4.51 10E6/UL (ref 3.8–5.2)
SODIUM SERPL-SCNC: 138 MMOL/L (ref 133–144)
T4 FREE SERPL-MCNC: 1.09 NG/DL (ref 0.76–1.46)
TRIGL SERPL-MCNC: 325 MG/DL
TSH SERPL DL<=0.005 MIU/L-ACNC: 0.75 MU/L (ref 0.4–4)
WBC # BLD AUTO: 13 10E3/UL (ref 4–11)

## 2021-11-09 PROCEDURE — 82043 UR ALBUMIN QUANTITATIVE: CPT

## 2021-11-09 PROCEDURE — 86803 HEPATITIS C AB TEST: CPT | Performed by: NURSE PRACTITIONER

## 2021-11-09 PROCEDURE — 86709 HEPATITIS A IGM ANTIBODY: CPT

## 2021-11-09 PROCEDURE — 86708 HEPATITIS A ANTIBODY: CPT

## 2021-11-09 PROCEDURE — 84443 ASSAY THYROID STIM HORMONE: CPT

## 2021-11-09 PROCEDURE — 80061 LIPID PANEL: CPT

## 2021-11-09 PROCEDURE — 85027 COMPLETE CBC AUTOMATED: CPT

## 2021-11-09 PROCEDURE — 86704 HEP B CORE ANTIBODY TOTAL: CPT | Performed by: NURSE PRACTITIONER

## 2021-11-09 PROCEDURE — 82306 VITAMIN D 25 HYDROXY: CPT

## 2021-11-09 PROCEDURE — 80053 COMPREHEN METABOLIC PANEL: CPT

## 2021-11-09 PROCEDURE — 36415 COLL VENOUS BLD VENIPUNCTURE: CPT

## 2021-11-09 PROCEDURE — 86706 HEP B SURFACE ANTIBODY: CPT | Performed by: NURSE PRACTITIONER

## 2021-11-09 PROCEDURE — 87340 HEPATITIS B SURFACE AG IA: CPT | Performed by: NURSE PRACTITIONER

## 2021-11-09 PROCEDURE — 84439 ASSAY OF FREE THYROXINE: CPT

## 2021-11-10 ENCOUNTER — TELEPHONE (OUTPATIENT)
Dept: NEUROLOGY | Facility: CLINIC | Age: 41
End: 2021-11-10
Payer: COMMERCIAL

## 2021-11-10 DIAGNOSIS — E87.6 HYPOKALEMIA: ICD-10-CM

## 2021-11-10 DIAGNOSIS — D72.829 LEUKOCYTOSIS, UNSPECIFIED TYPE: ICD-10-CM

## 2021-11-10 DIAGNOSIS — R79.89 ELEVATED LFTS: Primary | ICD-10-CM

## 2021-11-10 LAB
DEPRECATED CALCIDIOL+CALCIFEROL SERPL-MC: 16 UG/L (ref 20–75)
HAV IGG SER QL IA: NONREACTIVE
HAV IGM SERPL QL IA: NONREACTIVE
HBV CORE AB SERPL QL IA: NONREACTIVE
HBV SURFACE AB SERPL IA-ACNC: 411.41 M[IU]/ML
HBV SURFACE AG SERPL QL IA: NONREACTIVE
HCV AB SERPL QL IA: NONREACTIVE

## 2021-11-10 RX ORDER — POTASSIUM CHLORIDE 1500 MG/1
20 TABLET, EXTENDED RELEASE ORAL DAILY
Qty: 7 TABLET | Refills: 0 | Status: SHIPPED | OUTPATIENT
Start: 2021-11-10 | End: 2021-11-17

## 2021-11-10 NOTE — RESULT ENCOUNTER NOTE
Shakeel Garcia,    Attached are your test results.  -Normal red blood cell (hgb) levels, high white blood cell count and normal platelet levels. ADVISE: will recheck    -Triglycerides are elevated which can increase your heart disease risk.  A diet high in fat and simple carbohydrates, genetics and being overweight can contribute to this.  Your LDL(bad) cholesterol and HDL(good) cholesterol levels are normal.  ADVISE: exercising 150 minutes of aerobic exercise per week (30 minutes 5 days per week or 50 minutes 3 days per week are options), weight control, are helpful to improve this.  In 12 months, you should recheck your fasting cholesterol panel by scheduling a lab-only appointment.  -Liver and gallbladder tests (ALT,AST, Alk phos,bilirubin) are significantly elevated. ADVISE: adding hepatitis labs and recheck in 1 week not fasting   -Kidney function (GFR) is normal.  -Sodium is normal.  -Potassium is decreased.  ADVISE: starting potassium 20 meq daily (a prescription was sent in to your pharmacy) and then rechecking this in 1 week   -Calcium is normal.  -Glucose is normal.  -TSH (thyroid stimulating hormone) level is normal which indicates appropriate thyroid replacement dosing.  ADVISE: continuing same replacement dose and rechecking this in 6 months.  -Microalbumin (urine protein) test is normal.  ADVISE: rechecking this annually.   Please contact us if you have any questions.    Bonita Mullins, CNP

## 2021-11-10 NOTE — TELEPHONE ENCOUNTER
Prior Authorization Specialty Medication Request    Medication/Dose: GA/Copaxone/Glatopa 40 mg   ICD code (if different than what is on RX):  Multiple Sclerosis, G35  Previously Tried and Failed:      Important Lab Values:   Rationale: Re-initiation of disease modifying therapy for demyelinating disease, please approve    Insurance Name: [employer]  Insurance ID: [id number]  Insurance Phone Number: [phone]    Pharmacy Information (if different than what is on RX)  Name:    Phone:

## 2021-11-11 NOTE — RESULT ENCOUNTER NOTE
Shakeel Garcia,    Attached are your test results.  Vitamin D level is low and oral supplementation should be started.  ADVISE: start script for Vitamin D once a week for 8 weeks and then Vitamin D3 over the counter 2000 IU daily to maintain levels. I will send the script in for you.   In 2 months, please schedule a lab only appointment to recheck Vitamin D levels (Vit D deficiency, DX: Vitamin D Deficiency)     Please contact us if you have any questions.    Bonita Mullins, CNP

## 2021-11-11 NOTE — RESULT ENCOUNTER NOTE
Shakeel Garcia,    Attached are your test results.  Hepatitis labs negative except positive for antibody for hepatitis B    Please contact us if you have any questions.    Bonita Mullins, CNP

## 2021-11-12 NOTE — TELEPHONE ENCOUNTER
PA Initiation    Medication: Copaxone 40MG syringes (PENDING)  Insurance Company: [employer] - Phone [phone] Fax [phone]  Pharmacy Filling the Rx: [facility] - [place], [place] - [address]  Filling Pharmacy Phone:    Filling Pharmacy Fax:    Start Date: 11/12/2021    Tracking #: [id number]    website to check PA status:  [email or web address]     Printed forms and placed in blue folder. Folder placed in physician's mailbox for review and signature.

## 2021-11-15 DIAGNOSIS — G35 MULTIPLE SCLEROSIS (H): Primary | ICD-10-CM

## 2021-11-15 RX ORDER — GLATIRAMER 40 MG/ML
40 INJECTION, SOLUTION SUBCUTANEOUS
Qty: 12 ML | Refills: 11 | OUTPATIENT
Start: 2021-11-15 | End: 2021-11-15

## 2021-11-15 RX ORDER — GLATIRAMER ACETATE 40 MG/ML
40 INJECTION, SOLUTION SUBCUTANEOUS
Qty: 12 ML | Refills: 11 | Status: SHIPPED | OUTPATIENT
Start: 2021-11-15 | End: 2022-07-01

## 2021-11-15 NOTE — TELEPHONE ENCOUNTER
Prior Authorization Approval    Authorization Effective Date: 11/11/2021  Authorization Expiration Date: 11/11/2026  Medication: Copaxone 40MG syringes (APPROVED)  Approved Dose/Quantity: 12mL/28 days  Reference #:     Insurance Company: [employer] - Phone [phone] Fax [phone]  Expected CoPay: $0     CoPay Card Available: Yes    Foundation Assistance Needed: [employer]  Which Pharmacy is filling the prescription (Not needed for infusion/clinic administered): [facility] - [place], [place] - [address]  Pharmacy Notified:    Patient Notified:        [employer] Request #: [id number]  [employer] Tracking Number: [id number] Patient Name: Nickie Garcia  Practitioner: Liz Franco  Contact Name: Eli BELL  Contact Phone: [phone]  Auth Status: Approved  Comments: [id number] request for authorization of Copaxone has been approved for 60 months effective 11/11/2021 through 11/11/2026. If continued use is required, please submit a new request for prior authorization with current clinical documentation prior to the end of this authorization.      Thank you,    Eli Lee CPh-T  Specialty Pharmacy Clinic [facility] [address] [place] [address]  Ph: [phone] Fax: [phone]  [email or web address]

## 2021-11-15 NOTE — TELEPHONE ENCOUNTER
Received refill request for Copaxone from [facility]; Patient was last seen in November and has follow up appointment in January with Dr. Grove. Refilled per MS refill protocol.    Willa James RN

## 2022-01-03 ENCOUNTER — OFFICE VISIT (OUTPATIENT)
Dept: NEUROLOGY | Facility: CLINIC | Age: 42
End: 2022-01-03
Payer: COMMERCIAL

## 2022-01-03 VITALS
SYSTOLIC BLOOD PRESSURE: 145 MMHG | HEART RATE: 84 BPM | DIASTOLIC BLOOD PRESSURE: 99 MMHG | BODY MASS INDEX: 30.89 KG/M2 | WEIGHT: 205 LBS

## 2022-01-03 DIAGNOSIS — F17.210 CIGARETTE NICOTINE DEPENDENCE WITHOUT COMPLICATION: ICD-10-CM

## 2022-01-03 DIAGNOSIS — G35 MS (MULTIPLE SCLEROSIS) (H): Primary | ICD-10-CM

## 2022-01-03 DIAGNOSIS — E55.9 VITAMIN D DEFICIENCY: ICD-10-CM

## 2022-01-03 PROCEDURE — 99215 OFFICE O/P EST HI 40 MIN: CPT | Performed by: PSYCHIATRY & NEUROLOGY

## 2022-01-03 NOTE — PROGRESS NOTES
Date of service: January 3, 2022    Referral source: Established patient of Dr. Liz Franco    Chief complaint: Multiple sclerosis    History of the Present Illness: Ms. Nickie Garcia is a 41-year-old right-handed woman who presents to the Multiple Sclerosis Clinic today to establish care with me.  She was previously followed by Dr. Franco, but the [facility] site is more convenient to her (actually her workplace), and her future care is expected to be through me.    Her history of symptoms of multiple sclerosis dates back to January 2018, at which time she developed blurred vision of the left eye that was diagnosed as optic neuritis by an outside ophthalmologist.  She was treated with IV methylprednisolone and her symptoms eventually recovered to baseline after about 3 months.  MRI imaging performed at that time was suggestive of demyelinating disease of the type seen in multiple sclerosis, and oligoclonal bands were also present in CSF.  The patient was placed on disease modifying therapy with a generic glatiramer acetate product.  She later switched to the brand name Copaxone product due to injection site reactions, which were better on the brand name product, apparently.  She did not have any clinical relapses and to her knowledge no new MRI lesions were noted while she was on glatiramer acetate, but she subsequently stopped this on her own in November 2019.  Shortly after that, she became pregnant and never reinitiated the medication after the birth of her son in August 2020.    She subsequently did well until October of last year, at which time she developed ascending numbness in the legs over a period of approximately 1 week, beginning in the feet and ascending to the level of the thighs.  She came back in for repeat evaluation with Dr. Franco at that time.  MRI scan of the cervical spine demonstrated an enhancing lesion in the posterior portion of the spinal cord at the level of the T2 vertebral  body, to which her symptoms were referable.  There was also felt to be subtle enhancement of a lesion in the left corona radiata in the brain, although this was more equivocal to my review.    Dr. Franco had prescribed Copaxone for her, but she currently has not restarted this medication.    She is here today to discuss the next steps in her care.    Past Medical History:  1.  Anxiety.  2.  Hypothyroidism.  3.  Hypertension.  4.  Status post tonsillectomy and adenoidectomy.    Medications: Levothyroxine 200 mcg by mouth daily    Family History: Family history is positive for a diagnosis of multiple sclerosis in the patient's mother as well as her maternal grandfather.     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  She is a current cigarette smoker.  She admits occasional excessive intake of alcohol.    She has 3 sons, ages 12, 10 and 16 months.    PHYSICAL EXAMINATION:    VITALS:  Blood pressure 145/99; pulse 84; weight 93 kg.  GENERAL:  Overweight woman who presents to the examination alone, awake and alert and in no acute distress.    NEUROLOGIC:    Cranial nerves:  Visual fields are full to confrontation.  Extraocular movements are intact with no internuclear ophthalmoplegia.  Facial strength is normal.  Palate elevation and tongue protrusion are normal.    Power:  Strength is within normal limits in proximal and distal muscles in the upper and lower limbs throughout.  Reflexes:  Roughly are symmetric and within normal limits in the arms and legs.    Motor/cerebellar:  There is no appendicular ataxia on finger-to-nose testing.  Rapid alternating movements are within normal limits in the extremities.  There is no pronator drift in the arms.    Gait:  The patient is able to ambulate on a flat, level surface without difficulty and able to walk on heels, toes and in tandem.    Assessment/plan:    1.  Multiple sclerosis  I discussed with the patient that her  MRIs in November demonstrated definite radiologic evidence of active inflammatory demyelination associated with a clinical relapse of multiple sclerosis.  In this context, we strongly recommend reinitiation of disease-modifying therapy.      We discussed availability of oral and parenteral disease modifying therapy options.  One concern that she has is that her liver function tests were unexpectedly elevated in November.  She is working on abstaining from alcohol at this time, with plans to recheck these values in a few weeks.  She indicates that if this is not resolved, she would be inclined to avoid any potentially hepatotoxic medications, which I agree is likely a good idea.  The parenteral therapies usually do not cause difficulties in this regard, although would be a significant escalation in her treatment strategy. I am going to obtain a ALLIE virus serology at the time of her next laboratory studies for risk stratification in this regard.      For now, she would like to proceed with reinitiation of Copaxone.  I told her that this prescription was previously sent by Dr. Franco and that she can contact the [facility] to arrange shipment.  She was in agreement with this plan.      I am planning on seeing her back in 10 months, approximately 1 year from the time of the previous images, with MRI scans of the brain and cervical spine to be performed prior to that visit to provide a new baseline for future reference.    2.  Vitamin D deficiency  The patient's vitamin D level was low at 16 mcg per liter when last checked in November.  Her primary care provider already sent a prescription for 50,000 units of vitamin D weekly to be taken for 8 weeks.  I told her to complete that prescription and then to begin taking 5000 International Units of vitamin D daily.    3. Cigarette nicotine dependence  In addition to multiple well-known health complications of cigarette smoking, there is compelling  epidemiologic evidence of worsened MS disease outcomes, on average, in people with MS who smoke versus those who do not. I told her that the single action that she could take that would have the greatest overall benefit for her health would be to discontinue cigarette smoking.    I spent 56 minutes on patient care activities related to this encounter on the date of service, including time spent in reviewing the chart, reviewing neuroimaging with the patient, obtaining history and examination from and in counseling the patient, and in documentation in the electronic medical record.    Lucio Grove MD        D: 2022   T: 2022   MT: [id number]    Name:     OSCAR YINGAnnetta  MRN:      [id number]        Account:    [id number]   :      1980           Visit Date: 2022     Document: [id number]

## 2022-01-03 NOTE — NURSING NOTE
Nickie Garcia's goals for this visit include:   Chief Complaint   Patient presents with     New Patient     Former Dr Franco patient. MS.        She requests these members of her care team be copied on today's visit information: no    PCP: Winter Perdomo    Referring Provider:  No referring provider defined for this encounter.

## 2022-01-03 NOTE — PATIENT INSTRUCTIONS
1. We discussed plan to reinitiate brand name Copaxone 40 mg three times weekly: you can call [phone] to reach the [facility] to arrange shipping    2. You can  previous prescription for vitamin D 50,000 units weekly for 8 weeks and then continue 5000 units of vitamin D daily thereafter    3. Return to clinic in 10 months with MRI scans of the brain and cervical spine to be performed prior to the visit

## 2022-01-03 NOTE — Clinical Note
1/3/2022         RE: Nickie Garcia  [address]  [facility] [address]        Dear Colleague,    Thank you for referring your patient, Nickie Garcia, to the [facility] [place]. Please see a copy of my visit note below.    Visit Date: 01/03/2022    REFERRAL SOURCE:  Established patient of Dr. Liz Franco    CHIEF COMPLAINT:  Multiple sclerosis.    HISTORY OF PRESENT ILLNESS:  Ms. Nickie Garcia is a 41-year-old right-handed woman who presents to the Multiple Sclerosis Clinic today to establish care with me.  She was previously followed by Dr. Franco, but the [facility] site is more convenient to her (actually her workplace) and her future care is expected to be through me.    Her history of symptoms of multiple sclerosis dates back to 01/2018, at which time she developed blurred vision of the left eye that was diagnosed as optic neuritis by an outside ophthalmologist.  She was treated with IV methylprednisolone and her symptoms eventually recovered to baseline after about 3 months.  MRI imaging performed at that time was suggestive of demyelinating disease of the type seen in multiple sclerosis and oligoclonal bands were also present in CSF.  The patient was placed on disease modifying therapy with generic glatiramer acetate product.  She later switched to the brand name Copaxone product due to injection site reactions, which were better on the brand name product, apparently.  She did not have any clinical relapses and to her knowledge no new MRI lesions were noted while she was on glatiramer acetate, but she subsequently stopped this on her own in 11/2019.  Shortly after that, she became pregnant and never reinitiated the medication after the birth of her son in 08/2020.    She subsequently did well until October of last year, at which time she developed ascending numbness in the legs over a period of approximately 1 week, beginning in the feet and  ascending to the level of the thighs.  She came back in for repeat evaluation with Dr. Franco at that time.  MRI scan of the cervical spine demonstrated an enhancing lesion in the posterior portion of the spinal cord at the level of the T2 vertebral body, to which her symptoms are referable.  There was also felt to be subtle enhancement of a lesion in the left corona radiata in the brain, although this was more equivocal to my review.    Dr. Franco had prescribed Copaxone for her, but she currently has not restarted this medication.    She is here today to discuss the next steps in her care.    PAST MEDICAL HISTORY:  1.  Anxiety.  2.  Hypothyroidism.  3.  Hypertension.  4.  Status post tonsillectomy and adenoidectomy.    MEDICATION:  Levothyroxine 200 mcg by mouth daily.    FAMILY HISTORY:  Positive for a diagnosis of multiple sclerosis in the patient's mother as well as her maternal grandfather.  She has 3 sons, ages 12, 10 and 16 months.    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  She admits occasional excessive intake of alcohol.    PHYSICAL EXAMINATION:    VITALS:  Blood pressure 145/99; pulse 84; weight 93 kg.  GENERAL:  Overweight woman who presents to the examination alone, awake and alert and in no acute distress.  NEUROLOGIC:  Cranial nerves:  Visual fields are full to confrontation.  Extraocular movements are intact with no internuclear ophthalmoplegia.  Facial strength is normal.  Palate elevation and tongue protrusion are normal.  Power:  Strength is within normal limits in proximal and distal muscles in the upper and lower limbs throughout.  Reflexes:  Roughly are symmetric and within normal limits in the arms and legs.  Motor/cerebellar:  There is no appendicular ataxia on finger-to-nose testing.  Rapid alternating movements are within normal limits in the extremities.  There is no pronator drift in the  arms.  Gait:  The patient is able to ambulate on a flat, level surface without difficulty and able to walk on heels, toes and in tandem.    ASSESSMENT PLAN:  1.  Multiple sclerosis:  I discussed with the patient that her MRIs in November demonstrated definite radiologic evidence of active inflammatory demyelination associated with clinical relapse of multiple sclerosis.  In this context, we strongly recommend reinitiation of disease-modifying therapy.  We discussed availability of oral and parenteral disease modifying therapy options.  One concern that she has is that her liver function tests were unexpectedly elevated in November.  She is working on abstaining from alcohol at this time ,with plans to recheck these values in a few weeks.  She indicates that if this is not resolved, she would be inclined to avoid any potentially hepatotoxic medications, which I agree is likely a good idea.  The parenteral therapies usually do not cause difficulties in this regard, although would be a significant escalation of treatment strategy.  I am going to obtain a ALLIE virus serology at the time of her next laboratory studies for risk stratification in this regard.  For now, she would like to proceed with reinitiation of Copaxone.  I told her that this prescription was previously sent by Dr. Franco and that she can contact the [facility] to arrange shipment.  She was in agreement with this plan.  I am planning on seeing her back in 10 months, approximately 1 year from the time of the previous images, with MRI scans of the brain and cervical spine to be performed prior to that visit to provide a new baseline for future reference.  2.  Vitamin D deficiency:  The patient's vitamin D level was low at 16 mcg per liter when last checked in November.  Her primary care provider already sent a prescription for 50,000 units of vitamin D weekly to be taken for 8 weeks.  I told her to complete that prescription and then to  begin taking 5000 International Units of vitamin D daily.    I spent 56 minutes on patient care activities related to this encounter on the date of service, including time spent in reviewing the chart, reviewing neuroimaging with the patient, obtaining history and examination from and in counseling the patient, and in documentation in the electronic medical record.    Lucio Grove MD        D: 2022   T: 2022   MT: [id number]    Name:     OSCAR YING  MRN:      [id number]        Account:    [id number]   :      1980           Visit Date: 2022     Document: [id number]      Again, thank you for allowing me to participate in the care of your patient.        Sincerely,        Lucio Grove MD

## 2022-01-03 NOTE — LETTER
1/3/2022        RE: Nickie Garcia  [address]  [facility] [address]    Referral source: Established patient of Dr. Liz Franco    Chief complaint: Multiple sclerosis    History of the Present Illness: Ms. Nickie Garcia is a 41-year-old right-handed woman who presents to the [facility]clerosis [facility] today to establish care with me.  She was previously followed by Dr. Franco, but the [place] site is more convenient to her (actually her workplace), and her future care is expected to be through me.    Her history of symptoms of multiple sclerosis dates back to January 2018, at which time she developed blurred vision of the left eye that was diagnosed as optic neuritis by an outside ophthalmologist.  She was treated with IV methylprednisolone and her symptoms eventually recovered to baseline after about 3 months.  MRI imaging performed at that time was suggestive of demyelinating disease of the type seen in multiple sclerosis, and oligoclonal bands were also present in CSF.  The patient was placed on disease modifying therapy with a generic glatiramer acetate product.  She later switched to the brand name Copaxone product due to injection site reactions, which were better on the brand name product, apparently.  She did not have any clinical relapses and to her knowledge no new MRI lesions were noted while she was on glatiramer acetate, but she subsequently stopped this on her own in November 2019.  Shortly after that, she became pregnant and never reinitiated the medication after the birth of her son in August 2020.    She subsequently did well until October of last year, at which time she developed ascending numbness in the legs over a period of approximately 1 week, beginning in the feet and ascending to the level of the thighs.  She came back in for repeat evaluation with Dr. Franco at that time.  MRI scan of the cervical spine demonstrated an enhancing lesion in the posterior  portion of the spinal cord at the level of the T2 vertebral body, to which her symptoms were referable.  There was also felt to be subtle enhancement of a lesion in the left corona radiata in the brain, although this was more equivocal to my review.    Dr. Franco had prescribed Copaxone for her, but she currently has not restarted this medication.    She is here today to discuss the next steps in her care.    Past Medical History:  1.  Anxiety.  2.  Hypothyroidism.  3.  Hypertension.  4.  Status post tonsillectomy and adenoidectomy.    Medications: Levothyroxine 200 mcg by mouth daily    Family History: Family history is positive for a diagnosis of multiple sclerosis in the patient's mother as well as her maternal grandfather.     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  She admits occasional excessive intake of alcohol.    She has 3 sons, ages 12, 10 and 16 months.    PHYSICAL EXAMINATION:    VITALS:  Blood pressure 145/99; pulse 84; weight 93 kg.  GENERAL:  Overweight woman who presents to the examination alone, awake and alert and in no acute distress.    NEUROLOGIC:    Cranial nerves:  Visual fields are full to confrontation.  Extraocular movements are intact with no internuclear ophthalmoplegia.  Facial strength is normal.  Palate elevation and tongue protrusion are normal.    Power:  Strength is within normal limits in proximal and distal muscles in the upper and lower limbs throughout.  Reflexes:  Roughly are symmetric and within normal limits in the arms and legs.    Motor/cerebellar:  There is no appendicular ataxia on finger-to-nose testing.  Rapid alternating movements are within normal limits in the extremities.  There is no pronator drift in the arms.    Gait:  The patient is able to ambulate on a flat, level surface without difficulty and able to walk on heels, toes and in tandem.    Assessment/plan:    1.   Multiple sclerosis  I discussed with the patient that her MRIs in November demonstrated definite radiologic evidence of active inflammatory demyelination associated with a clinical relapse of multiple sclerosis.  In this context, we strongly recommend reinitiation of disease-modifying therapy.      We discussed availability of oral and parenteral disease modifying therapy options.  One concern that she has is that her liver function tests were unexpectedly elevated in November.  She is working on abstaining from alcohol at this time, with plans to recheck these values in a few weeks.  She indicates that if this is not resolved, she would be inclined to avoid any potentially hepatotoxic medications, which I agree is likely a good idea.  The parenteral therapies usually do not cause difficulties in this regard, although would be a significant escalation in her treatment strategy. I am going to obtain a ALLIE virus serology at the time of her next laboratory studies for risk stratification in this regard.      For now, she would like to proceed with reinitiation of Copaxone.  I told her that this prescription was previously sent by Dr. Franco and that she can contact the [facility] to arrange shipment.  She was in agreement with this plan.      I am planning on seeing her back in 10 months, approximately 1 year from the time of the previous images, with MRI scans of the brain and cervical spine to be performed prior to that visit to provide a new baseline for future reference.    2.  Vitamin D deficiency  The patient's vitamin D level was low at 16 mcg per liter when last checked in November.  Her primary care provider already sent a prescription for 50,000 units of vitamin D weekly to be taken for 8 weeks.  I told her to complete that prescription and then to begin taking 5000 International Units of vitamin D daily.    3. Cigarette nicotine dependence  In addition to multiple well-known health  complications of cigarette smoking, there is compelling epidemiologic evidence of worsened MS disease outcomes, on average, in people with MS who smoke versus those who do not. I told her that the single action that she could take that would have the greatest overall benefit for her health would be to discontinue cigarette smoking.    I spent 56 minutes on patient care activities related to this encounter on the date of service, including time spent in reviewing the chart, reviewing neuroimaging with the patient, obtaining history and examination from and in counseling the patient, and in documentation in the electronic medical record.    Lucio Grove MD   of Neurology  [facility]    Cc:  Bonita Mullins CNP (PCP)  Liz Franco MD (Neurology)  Patient

## 2022-01-18 ENCOUNTER — LAB REQUISITION (OUTPATIENT)
Dept: LAB | Facility: CLINIC | Age: 42
End: 2022-01-18

## 2022-01-18 LAB — SARS-COV-2 RNA RESP QL NAA+PROBE: POSITIVE

## 2022-01-18 PROCEDURE — U0003 INFECTIOUS AGENT DETECTION BY NUCLEIC ACID (DNA OR RNA); SEVERE ACUTE RESPIRATORY SYNDROME CORONAVIRUS 2 (SARS-COV-2) (CORONAVIRUS DISEASE [COVID-19]), AMPLIFIED PROBE TECHNIQUE, MAKING USE OF HIGH THROUGHPUT TECHNOLOGIES AS DESCRIBED BY CMS-2020-01-R: HCPCS | Performed by: INTERNAL MEDICINE

## 2022-02-23 ENCOUNTER — E-VISIT (OUTPATIENT)
Dept: FAMILY MEDICINE | Facility: CLINIC | Age: 42
End: 2022-02-23
Payer: COMMERCIAL

## 2022-02-23 DIAGNOSIS — M54.30 SCIATICA, UNSPECIFIED LATERALITY: Primary | ICD-10-CM

## 2022-02-23 PROCEDURE — 99421 OL DIG E/M SVC 5-10 MIN: CPT | Performed by: NURSE PRACTITIONER

## 2022-02-23 RX ORDER — METHYLPREDNISOLONE 4 MG
TABLET, DOSE PACK ORAL
Qty: 21 TABLET | Refills: 0 | Status: SHIPPED | OUTPATIENT
Start: 2022-02-23 | End: 2022-07-15

## 2022-02-23 NOTE — PATIENT INSTRUCTIONS
"  Patient Education     * Sciatica     Sciatica (\"Lumbar Radiculopathy\") causes a pain that spreads from the lower back down into the buttock, hip and leg. Sometimes leg pain can occur without any back pain. Sciatica is due to irritation or pressure on a spinal nerve as it comes out of the spinal canal. This is most often due to pressure on the nerve from a nearby spinal disk (the cartilage cushion between each spinal bone). Other causes include spinal stenosis (narrowing of the spinal canal) and spasm of the piriformis muscle (a muscle in the buttocks that the sciatic nerve passes through).  Sciatica may begin after a sudden twisting/bending force (such as in a car accident), or sometimes after a simple awkward movement. In either case, muscle spasm is commonly present and can add to the pain.  The diagnosis of sciatica is made from the symptoms and physical exam. Unless you had a physical injury (such as a car accident or fall), X-rays are usually not ordered for the initial evaluation of sciatica because the nerves and disks cannot be seen on an X-ray. Most sciatica (80-90%) gets better with time.  What can I do about my low back pain?  There are three main things you can do to ease low back pain and help it go away.    Stay active! Use positions, movements and exercises. Too much rest can make your symptoms worse.    Use heat or cold packs.    Take medicine as directed.  Using positions, movements and exercises  Research tells us that moving your joints and muscles can help you recover from back pain. Such activity should be simple and gentle.  Use walking to help relieve your discomfort. Try taking a short walk every 3 to 4 hours during the day. Walk for a few minutes inside your home or take longer walks outside, on a treadmill or at a mall. Slowly increase the amount of time you walk. Expect discomfort when you begin, but it should lessen as your back starts to recover.  Finding a position that is " comfortable  When your back pain is new, you may find that certain positions will ease your pain. Gently try each of the following positions until you find one that eases your pain. Once you find a position of comfort, use it as often as you like while you recover. Return to your daily routine as soon as possible.    Lie on your back with your legs bent. You can do this by placing a pillow under your knees or lie on the floor and rest your lower legs on the seat of a chair.    Lie on your side with your knees bent and place a pillow between your knees.    Lie on your stomach over pillows.  Using heat or cold packs  Try cold packs or gentle heat to ease your pain. Use whichever gives you the most relief. Apply the cold pack or heat for 15 minutes at a time, as often as needed.  Taking medicine  If taking over-the-counter medicine:    Take ibuprofen (Advil, Motrin) 600 mg. three times a day as needed for pain.  OR    Take Aleve (naproxen sodium) 220 to 440 mg. two times a day as needed for pain.  If your doctor prescribed medicine, follow the instructions. Stop taking the medicine as soon as you can.  When should I call my doctor?  Your back pain should improve over the first couple of weeks. As it improves, you should be able to return to your normal activities. But call your doctor if:    You have a sudden change in your ability to control?your bladder or bowels.    You begin to feel tingling in your groin or legs.    The pain spreads down your leg and into your foot.    Your toes, feet or leg muscles begin to feel weak.    You feel generally unwell or sick.    Your pain gets worse.  For informational purposes only. Not to replace the advice of your health care provider.  Copyright   2018 "[employer]. All rights reserved.

## 2022-05-18 DIAGNOSIS — E06.3 HYPOTHYROIDISM DUE TO HASHIMOTO'S THYROIDITIS: ICD-10-CM

## 2022-05-22 RX ORDER — LEVOTHYROXINE SODIUM 200 MCG
200 TABLET ORAL DAILY
Qty: 90 TABLET | Refills: 0 | Status: SHIPPED | OUTPATIENT
Start: 2022-05-22 | End: 2022-11-14

## 2022-05-22 NOTE — TELEPHONE ENCOUNTER
"  SYNTHROID 200 MCG tablet    Last Written Prescription Date:  5/4/21  Last Fill Quantity: 90,   # refills: 3  Last Office Visit :5/4/21  Future Office visit:  None     \"   Return in about 6 months (around 11/4/2021\"    Scheduling has been notified to contact the pt for appointment.          "

## 2022-05-23 ENCOUNTER — TELEPHONE (OUTPATIENT)
Dept: ENDOCRINOLOGY | Facility: CLINIC | Age: 42
End: 2022-05-23
Payer: COMMERCIAL

## 2022-05-23 NOTE — TELEPHONE ENCOUNTER
5/23 1st attempt.  LVM for patient to schedule an overdue follow up visit with Dr. Reynolds at patient's earliest convenience.    Patient needs to schedule follow up in order to continue refills.    Please assist patient in scheduling when she calls back.      Thanks    Brooklyn Barnes  Pediatric Specialty /Adult Endocrinology  [facility]

## 2022-05-26 ENCOUNTER — TELEPHONE (OUTPATIENT)
Dept: ENDOCRINOLOGY | Facility: CLINIC | Age: 42
End: 2022-05-26
Payer: COMMERCIAL

## 2022-05-26 NOTE — TELEPHONE ENCOUNTER
5/26 1st attempt.  M for patient to schedule an overdue follow up visit with Dr. Reynolds.    Please assist patient in scheduling when she calls back.    Thanks      Brooklyn Barnes  Pediatric Specialty /Adult Endocrinology  [facility]

## 2022-05-31 NOTE — TELEPHONE ENCOUNTER
5/31 2nd attempt.  LVM for patient to schedule an overdue follow up visit with Dr. Reynolds.     Please assist patient in scheduling when she calls back.     Thanks        Brooklyn Barnes  Pediatric Specialty /Adult Endocrinology  [facility]

## 2022-06-30 ENCOUNTER — TELEPHONE (OUTPATIENT)
Dept: FAMILY MEDICINE | Facility: CLINIC | Age: 42
End: 2022-06-30

## 2022-06-30 ENCOUNTER — ANCILLARY PROCEDURE (OUTPATIENT)
Dept: ULTRASOUND IMAGING | Facility: CLINIC | Age: 42
End: 2022-06-30
Attending: NURSE PRACTITIONER
Payer: COMMERCIAL

## 2022-06-30 DIAGNOSIS — R10.2 PELVIC PAIN IN FEMALE: ICD-10-CM

## 2022-06-30 PROCEDURE — 76830 TRANSVAGINAL US NON-OB: CPT | Performed by: RADIOLOGY

## 2022-06-30 PROCEDURE — 76856 US EXAM PELVIC COMPLETE: CPT | Performed by: RADIOLOGY

## 2022-06-30 NOTE — TELEPHONE ENCOUNTER
Reason for Call:  Same Day Appointment, Requested Provider:  any    PCP: Bonita Mullins    Reason for visit: patient had an ultrasound. Her IUD is out of place. She will need it removed.     Duration of symptoms:     Have you been treated for this in the past? No    Additional comments: please call. Patient wondering if anyone would work her in asap     Can we leave a detailed message on this number? YES    Phone number patient can be reached at: Home number on file [phone] (home)    Best Time: any    Call taken on 6/30/2022 at 2:56 PM by Freda Casas

## 2022-07-01 ENCOUNTER — OFFICE VISIT (OUTPATIENT)
Dept: OBGYN | Facility: CLINIC | Age: 42
End: 2022-07-01
Payer: COMMERCIAL

## 2022-07-01 VITALS
SYSTOLIC BLOOD PRESSURE: 143 MMHG | HEART RATE: 107 BPM | OXYGEN SATURATION: 100 % | DIASTOLIC BLOOD PRESSURE: 89 MMHG | BODY MASS INDEX: 29.04 KG/M2 | WEIGHT: 191.6 LBS | HEIGHT: 68 IN

## 2022-07-01 DIAGNOSIS — Z30.432 ENCOUNTER FOR REMOVAL OF INTRAUTERINE CONTRACEPTIVE DEVICE: Primary | ICD-10-CM

## 2022-07-01 DIAGNOSIS — N89.8 VAGINAL DISCHARGE: ICD-10-CM

## 2022-07-01 LAB
CLUE CELLS: ABNORMAL
TRICHOMONAS, WET PREP: ABNORMAL
WBC'S/HIGH POWER FIELD, WET PREP: ABNORMAL
YEAST, WET PREP: ABNORMAL

## 2022-07-01 PROCEDURE — 99213 OFFICE O/P EST LOW 20 MIN: CPT | Mod: 25 | Performed by: NURSE PRACTITIONER

## 2022-07-01 PROCEDURE — 87491 CHLMYD TRACH DNA AMP PROBE: CPT | Performed by: NURSE PRACTITIONER

## 2022-07-01 PROCEDURE — 87591 N.GONORRHOEAE DNA AMP PROB: CPT | Performed by: NURSE PRACTITIONER

## 2022-07-01 PROCEDURE — 58301 REMOVE INTRAUTERINE DEVICE: CPT | Mod: 52 | Performed by: NURSE PRACTITIONER

## 2022-07-01 PROCEDURE — 87210 SMEAR WET MOUNT SALINE/INK: CPT | Performed by: NURSE PRACTITIONER

## 2022-07-01 ASSESSMENT — PAIN SCALES - GENERAL: PAINLEVEL: NO PAIN (0)

## 2022-07-01 NOTE — TELEPHONE ENCOUNTER
Scheduled pt for IUD removal.  Pt aware if Marcelle is unable to removal IUD she will need to schedule a consult with an MD OR DO for surgically removing IUD.    Pt voiced understanding and had no other questions.  Alice West CMA 7/1/2022 10:19 AM

## 2022-07-01 NOTE — Clinical Note
Dr. Key- Patient has a malpositioned IUD, I was not able to remove it in clinic. She would like hysteroscopic removal, she is also considering sterilization.  She has been having pelvic pain-so I took a 15 minute slot for her on 7/6/22. If it's not ok, let me know! I just hated to make her wait several weeks for the consult. Thanks! Marcelle

## 2022-07-01 NOTE — PROGRESS NOTES
Assessment & Plan     Encounter for removal of intrauterine contraceptive device  Ultrasound result reviewed and removal procedure discussed with patient and she desires to proceed. Consent obtained. 3 attempts made to remove IUD, but is appears to be attached/embedded-able to see the tip at the cervical os, but it does not move further. Procedure abandoned and discussed options for Endosee in clinic vs Hysteroscopic removal in the OR. Patient would like to plan hysteroscopic removal in the OR, will consider options this week, but may desire permanent sterilization at the same time. Appointment scheduled. Patient advised to abstain from intercourse-if they choose to be sexually active, recommend back up method. Patient is given an opportunity to ask questions and have them answered.  - REMOVE INTRAUTERINE DEVICE    Vaginal discharge  Wet prep negative, await remaining labs and treat if indicated.  - Wet preparation  - Chlamydia trachomatis PCR  - Neisseria gonorrhoeae PCR    JUNO Moreno CNP  [facility] DEE Fu is a 42 year old, presenting for the following health issues:  IUD (removal)      HPI     Patient had a pelvic ultrasound yesterday due to pelvic pain and her Mirena IUD was noted to be in the lower uterine segment. Patient presents today for removal. They are not planning additional pregnancies. Does not desire new IUD, did not do well with Depo Provera. Borderline elevated blood pressures, not sure she would want to do oral contraceptive pill. Vasectomy for her  may be an option, but she also has questions on permanent sterilization for herself.   Had also been having some vaginal bleeding, which is not typical for her, vaginal discharge with odor. Would like STI screening just to rule out additional etiologies of symptoms.    Review of Systems   Constitutional, HEENT, cardiovascular, pulmonary, gi and gu systems are negative, except as otherwise  "noted.      Objective    BP (!) 143/89 (BP Location: Right arm, Patient Position: Sitting, Cuff Size: Adult Regular)   Pulse 107   Ht 1.735 m (5' 8.31\")   Wt 86.9 kg (191 lb 9.6 oz)   SpO2 100%   BMI 28.87 kg/m    Body mass index is 28.87 kg/m .  Physical Exam   GENERAL: healthy, alert and no distress   (female): normal female external genitalia, normal urethral meatus, vaginal mucosa pink. Presence of dark brown blood in vagina. IUD strings visible and appropriate length  MS: no gross musculoskeletal defects noted, no edema  SKIN: no suspicious lesions or rashes  PSYCH: mentation appears normal, affect normal/bright    Results for orders placed or performed in visit on 07/01/22 (from the past 24 hour(s))   Wet preparation    Specimen: Vagina; Swab   Result Value Ref Range    Trichomonas Absent Absent    Yeast Absent Absent    Clue Cells Absent Absent    WBCs/high power field 2+ (A) None       "

## 2022-07-02 LAB
C TRACH DNA SPEC QL NAA+PROBE: NEGATIVE
N GONORRHOEA DNA SPEC QL NAA+PROBE: NEGATIVE

## 2022-07-06 ENCOUNTER — OFFICE VISIT (OUTPATIENT)
Dept: OBGYN | Facility: CLINIC | Age: 42
End: 2022-07-06
Payer: COMMERCIAL

## 2022-07-06 ENCOUNTER — PREP FOR PROCEDURE (OUTPATIENT)
Dept: OBGYN | Facility: CLINIC | Age: 42
End: 2022-07-06

## 2022-07-06 VITALS
HEART RATE: 88 BPM | SYSTOLIC BLOOD PRESSURE: 144 MMHG | WEIGHT: 190.4 LBS | BODY MASS INDEX: 28.69 KG/M2 | DIASTOLIC BLOOD PRESSURE: 95 MMHG

## 2022-07-06 DIAGNOSIS — Z30.09 CONSULTATION FOR FEMALE STERILIZATION: ICD-10-CM

## 2022-07-06 DIAGNOSIS — T83.32XS MALPOSITIONED IUD, SEQUELA: Primary | ICD-10-CM

## 2022-07-06 DIAGNOSIS — Z30.2 ENCOUNTER FOR STERILIZATION: ICD-10-CM

## 2022-07-06 PROCEDURE — 99213 OFFICE O/P EST LOW 20 MIN: CPT | Performed by: OBSTETRICS & GYNECOLOGY

## 2022-07-06 NOTE — PROGRESS NOTES
SUBJECTIVE:       HPI: Nickie Garcia is a 42 year old  who presents today for presents today for consultation regarding embedded IUD, referral from Marcelle Boggs.    Patient seen with Marcelle on  for IUD removal for malpositioned IUD with associated pelvic pain. IUD tip was noted in the cervix but unable to be removed in clinic due to significant resistance.  Patient declines another IUD and is considering TL. She is done having children.         reports that she has been smoking. She has been smoking about 0.50 packs per day. She has never used smokeless tobacco.      Today's PHQ-2 Score:   PHQ-2 (  [employer]) 1/3/2022   Q1: Little interest or pleasure in doing things 0   Q2: Feeling down, depressed or hopeless 0   PHQ-2 Score 0   PHQ-2 Total Score (12-17 Years)- Positive if 3 or more points; Administer PHQ-A if positive -   Q1: Little interest or pleasure in doing things -   Q2: Feeling down, depressed or hopeless -   PHQ-2 Score -     Today's PHQ-9 Score:   PHQ-9 SCORE 2020   PHQ-9 Total Score 0     Today's XAVI-7 Score: No flowsheet data found.    Problem list and histories reviewed & adjusted, as indicated.  Additional history: as documented.    Patient Active Problem List   Diagnosis     CARDIOVASCULAR SCREENING; LDL GOAL LESS THAN 160     Hashimotos thyroiditis     Vitamin D deficiency     Anxiety disorder     Multiple sclerosis (H)     History of abnormal cervical Pap smear     Essential hypertension     Antepartum multigravida of advanced maternal age     Discogenic low back pain     Kidney stone     Class 1 obesity due to excess calories with serious comorbidity and body mass index (BMI) of 30.0 to 30.9 in adult     Anemia     Delayed postpartum hemorrhage     Past Surgical History:   Procedure Laterality Date     HC COLP CERVIX/UPPER VAGINA  2017     HC DILATION/CURETTAGE DIAG/THER NON OB  2017    benign polyp     HC INSERTION INTRAUTERINE DEVICE       HC REMOVE INTRAUTERINE  DEVICE  2012      TOOTH EXTRACTION W/FORCEP       PE TUBES       TONSILLECTOMY & ADENOIDECTOMY        Social History     Tobacco Use     Smoking status: Current Every Day Smoker     Packs/day: 0.50     Smokeless tobacco: Never Used   Substance Use Topics     Alcohol use: No      Problem (# of Occurrences) Relation (Name,Age of Onset)    Aneurysm (1) Father    Cancer (2) Maternal Grandmother: brain, Paternal Uncle: cancer in eye and liver     Cerebrovascular Disease (1) Father (54): hemorrhagic stroke d/t rupture of brain aneurysm    Diabetes (2) Paternal Grandfather, Other    Heart Disease (1) Paternal Grandmother: chf    Hypertension (1) Father    Multiple Sclerosis (1) Maternal Grandfather    Neurologic Disorder (1) Mother: ms    No Known Problems (1) Sister            ibuprofen (ADVIL/MOTRIN) 600 MG tablet, Take 1 tablet (600 mg) by mouth every 6 hours as needed (for mild to moderate pain)  levonorgestrel (MIRENA, 52 MG,) 20 MCG/24HR IUD, One device, intrauterine, for up to 5 years.  methylPREDNISolone (MEDROL DOSEPAK) 4 MG tablet therapy pack, Take as directed.  SYNTHROID 200 MCG tablet, Take 1 tablet (200 mcg) by mouth daily Six days a week and take half a tablet (100 mcg) one day a week. Call clinic to schedule follow up appointment.    No current facility-administered medications on file prior to visit.    Allergies   Allergen Reactions     Wellbutrin [Bupropion] Other (See Comments)     Optic neuritis       ROS:  10 Point review of systems negative other noted above in HPI    OBJECTIVE:     There were no vitals taken for this visit.  There is no height or weight on file to calculate BMI.      Gen: Alert, oriented, appropriately interactive, NAD  Psych: mentation appears normal, affect normal/bright, judgement and insight intact, normal speech and appearance well-groomed        In-Clinic Test Results:  No results found for this or any previous visit (from the past 24 hour(s)).   Results for orders placed or  performed in visit on 22   US Pelvic Complete with Transvaginal    Narrative    EXAMINATION: US PELVIC TRANSABDOMINAL AND TRANSVAGINAL 2022 11:44  AM      CLINICAL HISTORY: IUD in place. LMP 2022.  Pelvic pain in female    COMPARISON: None    PROCEDURE COMMENT: Both transabdominal and transvaginal imaging  performed of the pelvis with color Doppler. 3-D imaging was performed.    FINDINGS:  The uterus measures  4.8 cm x 8.1 cm x 5.5 cm. No focal myometrial  mass. The uterus is retroflexed. The IUD is abnormally located in the  lower uterine segment.    The endometrial stripe measures 4 mm.     The right ovary measures 1.4 cm x 2.4 cm x 2.7 cm. The left ovary  measures 1.4 cm x 2.1 cm x 1.4 cm. Normal ovarian morphology.     There is no simple free fluid in the pelvis.  No adnexal mass.      Impression    IMPRESSION:  1. Abnormal position of the IUD in the lower uterine segment.  Retroflexed uterus.    AN MD IRMA         SYSTEM ID:  [id number]          ASSESSMENT/PLAN:                                                      Nickie Garcia is a 42 year old  who presents today for presents today for consultation regarding embedded IUD, referral from Marcelle Boggs.      ICD-10-CM    1. Malpositioned IUD, sequela  T83.32XS    2. Consultation for female sterilization  Z30.09        Options going forward reviewed, including repeat attempt today in office +/- EndoSee, hysteroscopic removal under sedation. We also discussed birth control available. After discussing risks and benefits of options, she decided that she would like to go forward with permanent contraception. Plan discussed would then include hysteroscopic IUD removal under sedation, laparoscopic bilateral salpingectomy. Details of the procedure were discussed with the patient.  Risks include, but are not limited to, bleeding, infection, and injury to surrounding organs such as the bowel, urinary system, nerves, and blood vessels.   Injury may result in repair at the time of the surgery or in a separate procedure.  All questions answered, and accepting these risks, the patient elects to proceed with the procedure.   FTP signed today however private insurance therefore low suspicion that it is needed.      I personally reviewed CNP progress and procedure notes, recent pelvic ultrasound.    Theresa Key DO  [facility]

## 2022-07-06 NOTE — PATIENT INSTRUCTIONS
If you have any questions regarding your visit, Please contact your care team.    [email or web address]: [phone]      Women s Health CLINIC HOURS TELEPHONE NUMBER   Theresa Key DO.    BETI Nayak -Surgery Scheduler  Monica - Surgery Scheduler    TERESA Santos, RN  TERESA Garcia     Monday, Thursday  [place]  7am-3pm    Tuesday, Wednesday  [place]  7am-3pm    E/O Friday &   [place]    Typical Surgery Days: Thursday or Friday   [facility]  [address] [place] [address] [phone] Phone  [phone] Fax    [address] [address] [phone] Phone    Imaging [phone] Phone    [facility] Labor and Delivery:  [phone] Phone     **Surgeries** Our Surgery Schedulers will contact you to schedule. If you do not receive a call within 3 business days, please call [phone].    Urgent Care locations:  [facility] Saturday and    9 am - 5 pm    Monday-Friday   12 pm - 8 pm  Saturday and    9 am - 5 pm   [phone]       If you need a medication refill, please contact your pharmacy. Please allow 3 business days for your refill to be completed.  As always, Thank you for trusting us with your healthcare needs!

## 2022-07-07 ENCOUNTER — TELEPHONE (OUTPATIENT)
Dept: OBGYN | Facility: CLINIC | Age: 42
End: 2022-07-07

## 2022-07-07 PROBLEM — Z30.2 ENCOUNTER FOR STERILIZATION: Status: ACTIVE | Noted: 2022-07-07

## 2022-07-07 PROBLEM — T83.32XS: Status: ACTIVE | Noted: 2022-07-07

## 2022-07-07 NOTE — TELEPHONE ENCOUNTER
Nickie Garcia  Female, 42 year old, 1980    MRN:   [id number]  Phone:   [phone] (M)    Theresa Key: Case request order has been signed for Nickie Garcia (CaseID: [id number])  Received: Yesterday  Theresa Key DO  [facility]yn Surg Sched Pool  Patient Name: Nickie Garcia   MRN: [id number]   Case#: [id number]   Surgeon(s) and Role:      * Theresa Key DO - Primary   Date requested: * No dates entered *   Location:  OR   Procedure(s):   SALPINGECTOMY, LAPAROSCOPIC bilateral Hysteroscopic intrauterine device removal (N/A)     Additional Instructions for the Case  Surgical Assistant: Yes  Multi Surgeon Case No  H&P:  Pre-op options: PCP  Post-op:  2 weeks  Sterilization consent:  Yes and was signed on 7/6- private insurance though.  Vendor: No  Surgical time needed: Average  ERAS patient: Yes- please send packet- Asc does not do ERA    SURGERY SCHEDULING AND PRECERTIFICATION    Medical Record Number: [id number]  Nickie Garcia  YOB: 1980   Phone: [phone] (home)   Primary Provider: Bonita Mullins      Malpositioned IUD, sequela [T83.32XS]  Encounter for sterilization [Z30.2]      Surgeon: Theresa Key DO  Surgical Procedure: SALPINGECTOMY, LAPAROSCOPIC bilateral Hysteroscopic intrauterine device removal (N/A)     Date of Surgery 07/21 Time of Surgery 11:30 am   Surgery to be performed at:  [facility]   Status: Outpatient  Type of Anesthesia Anticipated: Local with MAC    Sterilization consent:  Yes and was signed on 07/06/22.    Pre-Op: On 07/15/22 with Janeth Mullins in [place] at 8:00 am   COVID testing: will do home test   Post-Op:  4 weeks on 8/22  with Yesi in   at 8:15    Pre-certification routed to [employer]:  Auto routes via Case Request    Surgery packet mailed to patient's home address: Yes  Patient instructed NPO 12 hours prior to surgery, arrive according to the time the nurse  gives patient when called prior to surgery, must have a .  Patient understood and agrees to the plan.      Requestor:  Maribel Arrieta     Location:  [place] [id number]

## 2022-07-15 ENCOUNTER — OFFICE VISIT (OUTPATIENT)
Dept: FAMILY MEDICINE | Facility: CLINIC | Age: 42
End: 2022-07-15
Payer: COMMERCIAL

## 2022-07-15 VITALS
TEMPERATURE: 99.1 F | SYSTOLIC BLOOD PRESSURE: 135 MMHG | HEIGHT: 68 IN | HEART RATE: 84 BPM | DIASTOLIC BLOOD PRESSURE: 86 MMHG | RESPIRATION RATE: 18 BRPM | OXYGEN SATURATION: 98 % | WEIGHT: 193.2 LBS | BODY MASS INDEX: 29.28 KG/M2

## 2022-07-15 DIAGNOSIS — Z13.6 CARDIOVASCULAR SCREENING; LDL GOAL LESS THAN 160: ICD-10-CM

## 2022-07-15 DIAGNOSIS — E03.9 HYPOTHYROIDISM, UNSPECIFIED TYPE: ICD-10-CM

## 2022-07-15 DIAGNOSIS — I10 ESSENTIAL HYPERTENSION: ICD-10-CM

## 2022-07-15 DIAGNOSIS — Z13.1 SCREENING FOR DIABETES MELLITUS (DM): ICD-10-CM

## 2022-07-15 DIAGNOSIS — Z01.818 PREOP GENERAL PHYSICAL EXAM: Primary | ICD-10-CM

## 2022-07-15 DIAGNOSIS — Z13.0 SCREENING FOR DISORDER OF BLOOD AND BLOOD-FORMING ORGANS: ICD-10-CM

## 2022-07-15 DIAGNOSIS — T83.32XS MALPOSITIONED IUD, SEQUELA: ICD-10-CM

## 2022-07-15 DIAGNOSIS — G35 MULTIPLE SCLEROSIS (H): ICD-10-CM

## 2022-07-15 PROCEDURE — 99214 OFFICE O/P EST MOD 30 MIN: CPT | Performed by: NURSE PRACTITIONER

## 2022-07-15 RX ORDER — AMLODIPINE BESYLATE 5 MG/1
5 TABLET ORAL DAILY
Qty: 90 TABLET | Refills: 3 | Status: SHIPPED | OUTPATIENT
Start: 2022-07-15 | End: 2023-04-18

## 2022-07-15 ASSESSMENT — PAIN SCALES - GENERAL: PAINLEVEL: NO PAIN (0)

## 2022-07-15 NOTE — PATIENT INSTRUCTIONS
Before your surgery:  10 days before: stop all over the counter supplements  1 week before: stop aspirin if you are taking aspirin.   stop ibuprofen, naproxen or similar antiinflammatory medications. You may use Tylenol for pain or headache.     On the day of your surgery:  Do not take any medication the morning of your surgery.     After surgery:    You may resume all your medications after the surgery unless your surgeon instructs you otherwise    Please schedule fasting labs   Preparing for Your Surgery  Getting started  A nurse will call you to review your health history and instructions. They will give you an arrival time based on your scheduled surgery time. Please be ready to share:  Your doctor's clinic name and phone number  Your medical, surgical and anesthesia history  A list of allergies and sensitivities  A list of medicines, including herbal treatments and over-the-counter drugs  Whether the patient has a legal guardian (ask how to send us the papers in advance)  Please tell us if you're pregnant--or if there's any chance you might be pregnant. Some surgeries may injure a fetus (unborn baby), so they require a pregnancy test. Surgeries that are safe for a fetus don't always need a test, and you can choose whether to have one.   If you have a child who's having surgery, please ask for a copy of Preparing for Your Child's Surgery.    Preparing for surgery  Within 30 days of surgery: Have a pre-op exam (sometimes called an H&P, or History and Physical). This can be done at a clinic or pre-operative center.  If you're having a , you may not need this exam. Talk to your care team.  At your pre-op exam, talk to your care team about all medicines you take. If you need to stop any medicines before surgery, ask when to start taking them again.  We do this for your safety. Many medicines can make you bleed too much during surgery. Some change how well surgery (anesthesia) drugs work.  Call your  insurance company to let them know you're having surgery. (If you don't have insurance, call [phone].)  Call your clinic if there's any change in your health. This includes signs of a cold or flu (sore throat, runny nose, cough, rash, fever). It also includes a scrape or scratch near the surgery site.  If you have questions on the day of surgery, call your hospital or surgery center.  COVID testing  You may need to be tested for COVID-19 before having surgery. If so, we will give you instructions.  Eating and drinking guidelines  For your safety: Unless your surgeon tells you otherwise, follow the guidelines below.  Eat and drink as usual until 8 hours before surgery. After that, no food or milk.  Drink clear liquids until 2 hours before surgery. These are liquids you can see through, like water, Gatorade and Propel Water. You may also have black coffee and tea (no cream or milk).  Nothing by mouth within 2 hours of surgery. This includes gum, candy and breath mints.  If you drink alcohol: Stop drinking it the night before surgery.  If your care team tells you to take medicine on the morning of surgery, it's okay to take it with a sip of water.  Preventing infection  Shower or bathe the night before and morning of your surgery. Follow the instructions your clinic gave you. (If no instructions, use regular soap.)  Don't shave or clip hair near your surgery site. We'll remove the hair if needed.  Don't smoke or vape the morning of surgery. You may chew nicotine gum up to 2 hours before surgery. A nicotine patch is okay.  Note: Some surgeries require you to completely quit smoking and nicotine. Check with your surgeon.  Your care team will make every effort to keep you safe from infection. We will:  Clean our hands often with soap and water (or an alcohol-based hand rub).  Clean the skin at your surgery site with a special soap that kills germs.  Give you a special gown to keep you warm. (Cold raises the risk of  infection.)  Wear special hair covers, masks, gowns and gloves during surgery.  Give antibiotic medicine, if prescribed. Not all surgeries need antibiotics.  What to bring on the day of surgery  Photo ID and insurance card  Copy of your health care directive, if you have one  Glasses and hearing aides (bring cases)  You can't wear contacts during surgery  Inhaler and eye drops, if you use them (tell us about these when you arrive)  CPAP machine or breathing device, if you use them  A few personal items, if spending the night  If you have . . .  A pacemaker, ICD (cardiac defibrillator) or other implant: Bring the ID card.  An implanted stimulator: Bring the remote control.  A legal guardian: Bring a copy of the certified (court-stamped) guardianship papers.  Please remove any jewelry, including body piercings. Leave jewelry and other valuables at home.  If you're going home the day of surgery  You must have a responsible adult drive you home. They should stay with you overnight as well.  If you don't have someone to stay with you, and you aren't safe to go home alone, we may keep you overnight. Insurance often won't pay for this.  After surgery  If it's hard to control your pain or you need more pain medicine, please call your surgeon's office.  Questions?   If you have any questions for your care team, list them here: _________________________________________________________________________________________________________________________________________________________________________ ____________________________________ ____________________________________ ____________________________________  For informational purposes only. Not to replace the advice of your health care provider. Copyright   2003, 2019 [facility]. All rights reserved. Clinically reviewed by Karen Saunders MD. [employer] [id number] - REV 07/21.

## 2022-07-15 NOTE — PROGRESS NOTES
[address] [address]  Phone: [phone]  Primary Provider: Zakiya Mullins  Pre-op Performing Provider: ZAKIYA MULLINS      PREOPERATIVE EVALUATION:  Today's date: 7/15/2022    Nickie Garcia is a 42 year old female who presents for a preoperative evaluation.    Surgical Information:  Surgery/Procedure: SALPINGECTOMY, LAPAROSCOPIC bilateral Hysteroscopic intrauterine device removal  Surgery Location: [facility]  Surgeon: Dr. Key  Surgery Date: 07/21/22  Time of Surgery: 11:35am  Where patient plans to recover: At home with family  Fax number for surgical facility: Note does not need to be faxed, will be available electronically in Epic.    Type of Anesthesia Anticipated: General        Subjective     HPI related to upcoming procedure: Malpositioned IUD     Preop Questions 7/15/2022   1. Have you ever had a heart attack or stroke? No   2. Have you ever had surgery on your heart or blood vessels, such as a stent placement, a coronary artery bypass, or surgery on an artery in your head, neck, heart, or legs? No   3. Do you have chest pain with activity? No   4. Do you have a history of  heart failure? No   5. Do you currently have a cold, bronchitis or symptoms of other infection? No   6. Do you have a cough, shortness of breath, or wheezing? No   7. Do you or anyone in your family have previous history of blood clots? No   8. Do you or does anyone in your family have a serious bleeding problem such as prolonged bleeding following surgeries or cuts? No   9. Have you ever had problems with anemia or been told to take iron pills? No   10. Have you had any abnormal blood loss such as black, tarry or bloody stools, or abnormal vaginal bleeding? No   11. Have you ever had a blood transfusion? No   12. Are you willing to have a blood transfusion if it is medically needed before, during, or after your  surgery? Yes   13. Have you or any of your relatives ever had problems with anesthesia? No   14. Do you have sleep apnea, excessive snoring or daytime drowsiness? No   15. Do you have any artifical heart valves or other implanted medical devices like a pacemaker, defibrillator, or continuous glucose monitor? No   16. Do you have artificial joints? No   17. Are you allergic to latex? No   18. Is there any chance that you may be pregnant? No       Health Care Directive:  Patient does not have a Health Care Directive or Living Will: Discussed advance care planning with patient; however, patient declined at this time.    Preoperative Review of :   reviewed - no record of controlled substances prescribed.      Status of Chronic Conditions:  See problem list for active medical problems.  Problems all longstanding and stable, except as noted/documented.  See ROS for pertinent symptoms related to these conditions.    HYPERTENSION - Patient has longstanding history of HTN , currently denies any symptoms referable to elevated blood pressure. Specifically denies chest pain, palpitations, dyspnea, orthopnea, PND or peripheral edema. Blood pressure readings have been in normal range. Current medication regimen is as listed below. Patient denies any side effects of medication.        Review of Systems  CONSTITUTIONAL: NEGATIVE for fever, chills, change in weight  INTEGUMENTARY/SKIN: NEGATIVE for rash   EYES: NEGATIVE for vision changes or irritation  ENT/MOUTH: NEGATIVE for ear, mouth and throat problems  RESP: NEGATIVE for significant cough or SOB  CV: NEGATIVE for chest pain, palpitations or peripheral edema  GI: NEGATIVE for nausea, poor appetite, vomiting and weight loss  : NEGATIVE for frequency, dysuria, or hematuria  MUSCULOSKELETAL: NEGATIVE for significant arthralgias or myalgia  NEURO: NEGATIVE for weakness, dizziness or paresthesias  ENDOCRINE: NEGATIVE for temperature intolerance, skin/hair changes  HEME:  NEGATIVE for bleeding problems  PSYCHIATRIC: NEGATIVE for changes in mood or affect    Patient Active Problem List    Diagnosis Date Noted     Malpositioned IUD, sequela 07/07/2022     Priority: Medium     Added automatically from request for surgery [id number]       Encounter for sterilization 07/07/2022     Priority: Medium     Added automatically from request for surgery [id number]       Delayed postpartum hemorrhage 08/27/2020     Priority: Medium     Anemia 07/30/2020     Priority: Medium     Antepartum multigravida of advanced maternal age 01/10/2020     Priority: Medium     Innatal neg.          Discogenic low back pain 01/10/2020     Priority: Medium     Class 1 obesity due to excess calories with serious comorbidity and body mass index (BMI) of 30.0 to 30.9 in adult 01/10/2020     Priority: Medium     Essential hypertension 09/05/2019     Priority: Medium     Kidney stone 01/01/2019     Priority: Medium     Multiple sclerosis (H) 08/30/2018     Priority: Medium     MS, relapsing, remitting:  Follows with neurology with Dr. Franco and the [facility].  Patient was diagnosed in January of 2018.  She states since starting Copaxone she is back to her norm.         History of abnormal cervical Pap smear 08/30/2018     Priority: Medium     4/13/18:  Pap NIL/HPV neg  2/15/17 LSIL pap,  HPV + (not 16/18). Plan: [place]  5/13/17 [place] bx: LSIL. ECC: negative. Plan: cotest in 1 yr.   4/13/18 NIL Pap, Neg HR HPV.   9/24/21 NIL Pap, Neg HR HPV. Plan: Cotest in 3 years.        Anxiety disorder 10/25/2012     Priority: Medium     Hashimotos thyroiditis 05/30/2012     Priority: Medium     Vitamin D deficiency 05/30/2012     Priority: Medium     CARDIOVASCULAR SCREENING; LDL GOAL LESS THAN 160 10/31/2010     Priority: Low      Past Medical History:   Diagnosis Date     Anxiety disorder 2012     Class 1 obesity due to excess calories with serious comorbidity and body mass index (BMI) of 30.0 to 30.9 in adult 2019     Discogenic  low back pain 2019     Essential hypertension 09/05/2019    borderline     History of abnormal cervical Pap smear 2017    4/13/18:  Pap NIL/HPV neg 2/2017: Pap LSIL HPV + (not 16/18)     Hypothyroid 2002     Kidney stone 2019     Multiple sclerosis (H) 2018    MS, relapsing, remitting:  Neurology.  Presented with optic neuritis.      Past Surgical History:   Procedure Laterality Date     HC COLP CERVIX/UPPER VAGINA  2017     HC DILATION/CURETTAGE DIAG/THER NON OB  2017    benign polyp     HC INSERTION INTRAUTERINE DEVICE  2012     HC REMOVE INTRAUTERINE DEVICE  2012     HC TOOTH EXTRACTION W/FORCEP       PE TUBES       TONSILLECTOMY & ADENOIDECTOMY       Current Outpatient Medications   Medication Sig Dispense Refill     ibuprofen (ADVIL/MOTRIN) 600 MG tablet Take 1 tablet (600 mg) by mouth every 6 hours as needed (for mild to moderate pain) 30 tablet 0     levonorgestrel (MIRENA, 52 MG,) 20 MCG/24HR IUD One device, intrauterine, for up to 5 years.       methylPREDNISolone (MEDROL DOSEPAK) 4 MG tablet therapy pack Take as directed. 21 tablet 0     SYNTHROID 200 MCG tablet Take 1 tablet (200 mcg) by mouth daily Six days a week and take half a tablet (100 mcg) one day a week. Call clinic to schedule follow up appointment. 90 tablet 0       Allergies   Allergen Reactions     Wellbutrin [Bupropion] Other (See Comments)     Optic neuritis        Social History     Tobacco Use     Smoking status: Current Every Day Smoker     Packs/day: 0.50     Smokeless tobacco: Never Used   Substance Use Topics     Alcohol use: No     Family History   Problem Relation Age of Onset     Hypertension Father      Cerebrovascular Disease Father 54        hemorrhagic stroke d/t rupture of brain aneurysm     Aneurysm Father      Neurologic Disorder Mother         ms     No Known Problems Sister      Cancer Maternal Grandmother         brain     Multiple Sclerosis Maternal Grandfather      Heart Disease Paternal Grandmother         chf      "Diabetes Paternal Grandfather      Diabetes Other      Cancer Paternal Uncle         cancer in eye and liver      History   Drug Use No         Objective     /86 (BP Location: Right arm, Patient Position: Sitting, Cuff Size: Adult Regular)   Pulse 84   Temp 99.1  F (37.3  C) (Oral)   Resp 18   Ht 1.727 m (5' 8\")   Wt 87.6 kg (193 lb 3.2 oz)   SpO2 98%   BMI 29.38 kg/m    Wt Readings from Last 4 Encounters:   07/15/22 87.6 kg (193 lb 3.2 oz)   07/06/22 86.4 kg (190 lb 6.4 oz)   07/01/22 86.9 kg (191 lb 9.6 oz)   01/03/22 93 kg (205 lb)       Physical Exam    GENERAL APPEARANCE: healthy, alert and no distress     EYES: Eyes grossly normal to inspection and conjunctivae and sclerae normal     HENT: ear canals and TM's normal and nose and mouth without ulcers or lesions     NECK: no adenopathy, no asymmetry, masses, or scars and thyroid normal to palpation     RESP: lungs clear to auscultation - no rales, rhonchi or wheezes     CV: regular rates and rhythm, no murmur, click or rub and no irregular beats     ABDOMEN:  soft, nontender, no HSM or masses and bowel sounds normal     MS: extremities normal- no gross deformities noted, no evidence of inflammation in joints, FROM in all extremities.     SKIN: no suspicious lesions or rashes     NEURO: Normal strength and tone, sensory exam grossly normal, mentation intact and speech normal     PSYCH: mentation appears normal. and affect normal/bright     LYMPHATICS: No cervical adenopathy    Recent Labs   Lab Test 11/09/21  1234 07/29/20  1446   HGB 14.7 10.3*     --      --    POTASSIUM 3.1*  --    CR 0.80  --         Diagnostics:     No EKG required, no history of coronary heart disease, significant arrhythmia, peripheral arterial disease or other structural heart disease.    Revised Cardiac Risk Index (RCRI):  The patient has the following serious cardiovascular risks for perioperative complications:   - No serious cardiac risks = 0 points     RCRI " Interpretation: 0 points: Class I (very low risk - 0.4% complication rate)    Assessment & Plan     The proposed surgical procedure is considered INTERMEDIATE risk.    Preop general physical exam  - Comprehensive metabolic panel  - CBC with platelets    Malpositioned IUD, sequela  - Comprehensive metabolic panel  - CBC with platelets    Multiple sclerosis (H)  FOLLOW UP WITH SPECIALIST :Neurology      CARDIOVASCULAR SCREENING; LDL GOAL LESS THAN 160  - Lipid panel reflex to direct LDL Fasting    Screening for disorder of blood and blood-forming organs  - CBC with platelets    Hypothyroidism, unspecified type  - TSH  - T4 free    Screening for diabetes mellitus (DM)  - Comprehensive metabolic panel    Essential hypertension  The current medical regimen is effective.  Continue current medication regimen unchanged.  - amLODIPine (NORVASC) 5 MG tablet  Dispense: 90 tablet; Refill: 3           Risks and Recommendations:  The patient has the following additional risks and recommendations for perioperative complications:   - No identified additional risk factors other than previously addressed    Medication Instructions:  Patient is to take all scheduled medications on the day of surgery    RECOMMENDATION:  APPROVAL GIVEN to proceed with proposed procedure, without further diagnostic evaluation.           Signed Electronically by: JUNO Knowles CNP  Copy of this evaluation report is provided to requesting physician.

## 2022-07-20 ENCOUNTER — ANESTHESIA EVENT (OUTPATIENT)
Dept: SURGERY | Facility: AMBULATORY SURGERY CENTER | Age: 42
End: 2022-07-20
Payer: COMMERCIAL

## 2022-07-21 ENCOUNTER — HOSPITAL ENCOUNTER (OUTPATIENT)
Facility: AMBULATORY SURGERY CENTER | Age: 42
Discharge: HOME | End: 2022-07-21
Attending: OBSTETRICS & GYNECOLOGY
Payer: COMMERCIAL

## 2022-07-21 ENCOUNTER — ANESTHESIA (OUTPATIENT)
Dept: SURGERY | Facility: AMBULATORY SURGERY CENTER | Age: 42
End: 2022-07-21
Payer: COMMERCIAL

## 2022-07-21 VITALS
TEMPERATURE: 98 F | OXYGEN SATURATION: 98 % | SYSTOLIC BLOOD PRESSURE: 111 MMHG | DIASTOLIC BLOOD PRESSURE: 72 MMHG | RESPIRATION RATE: 12 BRPM | HEART RATE: 69 BPM

## 2022-07-21 DIAGNOSIS — Z30.2 ENCOUNTER FOR STERILIZATION: ICD-10-CM

## 2022-07-21 DIAGNOSIS — T83.32XS MALPOSITIONED IUD, SEQUELA: ICD-10-CM

## 2022-07-21 DIAGNOSIS — Z98.890 S/P LAPAROSCOPY: Primary | ICD-10-CM

## 2022-07-21 LAB — HCG UR QL: NEGATIVE

## 2022-07-21 PROCEDURE — 81025 URINE PREGNANCY TEST: CPT | Performed by: OBSTETRICS & GYNECOLOGY

## 2022-07-21 PROCEDURE — 58661 LAPAROSCOPY REMOVE ADNEXA: CPT

## 2022-07-21 PROCEDURE — 58301 REMOVE INTRAUTERINE DEVICE: CPT

## 2022-07-21 PROCEDURE — 58661 LAPAROSCOPY REMOVE ADNEXA: CPT | Performed by: OBSTETRICS & GYNECOLOGY

## 2022-07-21 PROCEDURE — G8907 PT DOC NO EVENTS ON DISCHARG: HCPCS

## 2022-07-21 PROCEDURE — 88302 TISSUE EXAM BY PATHOLOGIST: CPT | Performed by: PATHOLOGY

## 2022-07-21 PROCEDURE — 58301 REMOVE INTRAUTERINE DEVICE: CPT | Mod: 51 | Performed by: OBSTETRICS & GYNECOLOGY

## 2022-07-21 PROCEDURE — G8918 PT W/O PREOP ORDER IV AB PRO: HCPCS

## 2022-07-21 RX ORDER — PROPOFOL 10 MG/ML
INJECTION, EMULSION INTRAVENOUS CONTINUOUS PRN
Status: DISCONTINUED | OUTPATIENT
Start: 2022-07-21 | End: 2022-07-21

## 2022-07-21 RX ORDER — FENTANYL CITRATE 50 UG/ML
25 INJECTION, SOLUTION INTRAMUSCULAR; INTRAVENOUS
Status: DISCONTINUED | OUTPATIENT
Start: 2022-07-21 | End: 2022-07-22 | Stop reason: HOSPADM

## 2022-07-21 RX ORDER — LIDOCAINE 40 MG/G
CREAM TOPICAL
Status: DISCONTINUED | OUTPATIENT
Start: 2022-07-21 | End: 2022-07-22 | Stop reason: HOSPADM

## 2022-07-21 RX ORDER — BUPIVACAINE HYDROCHLORIDE AND EPINEPHRINE 2.5; 5 MG/ML; UG/ML
INJECTION, SOLUTION INFILTRATION; PERINEURAL PRN
Status: DISCONTINUED | OUTPATIENT
Start: 2022-07-21 | End: 2022-07-21 | Stop reason: HOSPADM

## 2022-07-21 RX ORDER — METOPROLOL TARTRATE 1 MG/ML
1-2 INJECTION, SOLUTION INTRAVENOUS EVERY 5 MIN PRN
Status: DISCONTINUED | OUTPATIENT
Start: 2022-07-21 | End: 2022-07-22 | Stop reason: HOSPADM

## 2022-07-21 RX ORDER — ONDANSETRON 4 MG/1
4 TABLET, ORALLY DISINTEGRATING ORAL EVERY 30 MIN PRN
Status: DISCONTINUED | OUTPATIENT
Start: 2022-07-21 | End: 2022-07-22 | Stop reason: HOSPADM

## 2022-07-21 RX ORDER — ACETAMINOPHEN 325 MG/1
975 TABLET ORAL ONCE
Status: DISCONTINUED | OUTPATIENT
Start: 2022-07-21 | End: 2022-07-22 | Stop reason: HOSPADM

## 2022-07-21 RX ORDER — LIDOCAINE HYDROCHLORIDE 20 MG/ML
INJECTION, SOLUTION INFILTRATION; PERINEURAL PRN
Status: DISCONTINUED | OUTPATIENT
Start: 2022-07-21 | End: 2022-07-21

## 2022-07-21 RX ORDER — KETOROLAC TROMETHAMINE 30 MG/ML
INJECTION, SOLUTION INTRAMUSCULAR; INTRAVENOUS PRN
Status: DISCONTINUED | OUTPATIENT
Start: 2022-07-21 | End: 2022-07-21

## 2022-07-21 RX ORDER — OXYCODONE HYDROCHLORIDE 5 MG/1
5-10 TABLET ORAL EVERY 4 HOURS PRN
Qty: 6 TABLET | Refills: 0 | Status: SHIPPED | OUTPATIENT
Start: 2022-07-21 | End: 2023-08-09

## 2022-07-21 RX ORDER — IBUPROFEN 800 MG/1
800 TABLET, FILM COATED ORAL EVERY 6 HOURS PRN
Qty: 30 TABLET | Refills: 0 | Status: SHIPPED | OUTPATIENT
Start: 2022-07-21 | End: 2023-12-07

## 2022-07-21 RX ORDER — DEXAMETHASONE SODIUM PHOSPHATE 4 MG/ML
INJECTION, SOLUTION INTRA-ARTICULAR; INTRALESIONAL; INTRAMUSCULAR; INTRAVENOUS; SOFT TISSUE PRN
Status: DISCONTINUED | OUTPATIENT
Start: 2022-07-21 | End: 2022-07-21

## 2022-07-21 RX ORDER — OXYCODONE HYDROCHLORIDE 5 MG/1
5 TABLET ORAL EVERY 4 HOURS PRN
Status: DISCONTINUED | OUTPATIENT
Start: 2022-07-21 | End: 2022-07-22 | Stop reason: HOSPADM

## 2022-07-21 RX ORDER — SODIUM CHLORIDE, SODIUM LACTATE, POTASSIUM CHLORIDE, CALCIUM CHLORIDE 600; 310; 30; 20 MG/100ML; MG/100ML; MG/100ML; MG/100ML
INJECTION, SOLUTION INTRAVENOUS CONTINUOUS
Status: DISCONTINUED | OUTPATIENT
Start: 2022-07-21 | End: 2022-07-22 | Stop reason: HOSPADM

## 2022-07-21 RX ORDER — FENTANYL CITRATE 50 UG/ML
25 INJECTION, SOLUTION INTRAMUSCULAR; INTRAVENOUS EVERY 5 MIN PRN
Status: DISCONTINUED | OUTPATIENT
Start: 2022-07-21 | End: 2022-07-22 | Stop reason: HOSPADM

## 2022-07-21 RX ORDER — IBUPROFEN 400 MG/1
800 TABLET, FILM COATED ORAL ONCE
Status: DISCONTINUED | OUTPATIENT
Start: 2022-07-21 | End: 2022-07-22 | Stop reason: HOSPADM

## 2022-07-21 RX ORDER — ONDANSETRON 2 MG/ML
4 INJECTION INTRAMUSCULAR; INTRAVENOUS EVERY 30 MIN PRN
Status: DISCONTINUED | OUTPATIENT
Start: 2022-07-21 | End: 2022-07-22 | Stop reason: HOSPADM

## 2022-07-21 RX ORDER — HYDRALAZINE HYDROCHLORIDE 20 MG/ML
2.5-5 INJECTION INTRAMUSCULAR; INTRAVENOUS EVERY 10 MIN PRN
Status: DISCONTINUED | OUTPATIENT
Start: 2022-07-21 | End: 2022-07-22 | Stop reason: HOSPADM

## 2022-07-21 RX ORDER — HYDROXYZINE HYDROCHLORIDE 25 MG/1
25 TABLET, FILM COATED ORAL
Status: DISCONTINUED | OUTPATIENT
Start: 2022-07-21 | End: 2022-07-22 | Stop reason: HOSPADM

## 2022-07-21 RX ORDER — ONDANSETRON 2 MG/ML
INJECTION INTRAMUSCULAR; INTRAVENOUS PRN
Status: DISCONTINUED | OUTPATIENT
Start: 2022-07-21 | End: 2022-07-21

## 2022-07-21 RX ORDER — ACETAMINOPHEN 325 MG/1
975 TABLET ORAL ONCE
Status: COMPLETED | OUTPATIENT
Start: 2022-07-21 | End: 2022-07-21

## 2022-07-21 RX ORDER — OXYCODONE HYDROCHLORIDE 5 MG/1
5 TABLET ORAL
Status: DISCONTINUED | OUTPATIENT
Start: 2022-07-21 | End: 2022-07-22 | Stop reason: HOSPADM

## 2022-07-21 RX ORDER — FENTANYL CITRATE 50 UG/ML
INJECTION, SOLUTION INTRAMUSCULAR; INTRAVENOUS PRN
Status: DISCONTINUED | OUTPATIENT
Start: 2022-07-21 | End: 2022-07-21

## 2022-07-21 RX ORDER — PROPOFOL 10 MG/ML
INJECTION, EMULSION INTRAVENOUS PRN
Status: DISCONTINUED | OUTPATIENT
Start: 2022-07-21 | End: 2022-07-21

## 2022-07-21 RX ADMIN — ONDANSETRON 4 MG: 2 INJECTION INTRAMUSCULAR; INTRAVENOUS at 12:34

## 2022-07-21 RX ADMIN — PROPOFOL 200 MG: 10 INJECTION, EMULSION INTRAVENOUS at 11:19

## 2022-07-21 RX ADMIN — LIDOCAINE HYDROCHLORIDE 100 MG: 20 INJECTION, SOLUTION INFILTRATION; PERINEURAL at 11:19

## 2022-07-21 RX ADMIN — SODIUM CHLORIDE, SODIUM LACTATE, POTASSIUM CHLORIDE, CALCIUM CHLORIDE: 600; 310; 30; 20 INJECTION, SOLUTION INTRAVENOUS at 10:33

## 2022-07-21 RX ADMIN — FENTANYL CITRATE 50 MCG: 50 INJECTION, SOLUTION INTRAMUSCULAR; INTRAVENOUS at 11:25

## 2022-07-21 RX ADMIN — FENTANYL CITRATE 50 MCG: 50 INJECTION, SOLUTION INTRAMUSCULAR; INTRAVENOUS at 11:19

## 2022-07-21 RX ADMIN — Medication 10 MG: at 11:30

## 2022-07-21 RX ADMIN — DEXAMETHASONE SODIUM PHOSPHATE 4 MG: 4 INJECTION, SOLUTION INTRA-ARTICULAR; INTRALESIONAL; INTRAMUSCULAR; INTRAVENOUS; SOFT TISSUE at 11:30

## 2022-07-21 RX ADMIN — Medication 40 MG: at 11:19

## 2022-07-21 RX ADMIN — KETOROLAC TROMETHAMINE 30 MG: 30 INJECTION, SOLUTION INTRAMUSCULAR; INTRAVENOUS at 12:05

## 2022-07-21 RX ADMIN — ONDANSETRON 4 MG: 2 INJECTION INTRAMUSCULAR; INTRAVENOUS at 11:30

## 2022-07-21 RX ADMIN — Medication 0.5 MG: at 11:51

## 2022-07-21 RX ADMIN — PROPOFOL 150 MCG/KG/MIN: 10 INJECTION, EMULSION INTRAVENOUS at 11:19

## 2022-07-21 RX ADMIN — OXYCODONE HYDROCHLORIDE 5 MG: 5 TABLET ORAL at 12:40

## 2022-07-21 RX ADMIN — ACETAMINOPHEN 975 MG: 325 TABLET ORAL at 10:26

## 2022-07-21 NOTE — PROGRESS NOTES
Pt states she can't take a deep breath. MDA came to bedside to assess. VSS and baseline. MDA states likely due to air from lap procedure. No concerns.

## 2022-07-21 NOTE — OP NOTE
HOSPITAL OPERATIVE NOTE  DATE/TIME OF SURGERY: 2022  PATIENT NAME: Nickie Garcia  MRN: [id number]  PATIENT : 1980      Preoperative Diagnosis: Desires permanent sterilization, Malpositioned IUD    Postoperative Diagnosis: same    Surgeon: Theresa Key DO    Procedure:  Laparoscopic bilateral salpingectomy, IUD removal under sedation    Anesthesia: General    EBL:   5 ml    Urine output:    NA    IVF:  700  ml    Speciman:  Bilateral fallopian tubes, Mirena IUD intact    Findings: Exam under anesthesia revealed retroflexed uterus. IUD strings visible and IUD easily removed. Intraoperative findings confirmed retroflexed uterus. Normal appearing fallopian tubes and ovaries.     Complications:  none    Indication: Nickie Garcia is a 42 year old  who presents for above procedure for malpositioned IUD, desiring permanent sterilization. Details of the procedure were discussed with the patient.  Risks include, but are not limited to, bleeding, infection, and injury to surrounding organs such as the bowel, urinary system, nerves, and blood vessels.  Injury may result in repair at the time of the surgery or in a separate procedure.  All questions answered, and accepting these risks, the patient elects to proceed with the procedure.      Procedure: The patient was taken to the operating room. She then underwent GETA, and was positioned to the dorsal lithotomy position with yellow-fin stirrups. She was prepped and draped in usual fashion. A time out was completed. A medium grave's speculum was placed in the vagina. A single toothed tenaculum was placed on the anterior lip of the cervix. The IUD strings were visualized, grasped and traction gently applied with removal of IUD intact without complication. All instruments were removed from the vagina.    Attention was then turned to the umbilicus, which was elevated manually.  0.5% marcaine was injected. A 5mm incision was made with  scalpel. Entry made directly using the 5 mm Visi-port and insufflation achieved. No entry trauma visualized. The patient was placed in steep Trendelenburg. Two additional 5 mm ports were placed in the LLQ & RLQ under direct visualization in standard fashion. The bowels were swept away using an atraumatic grasper. The tubes were then sequentially elevated and ligated laterally at the fimbriated ends and medially at the cornua using the Ligasure. Each tube was passed out through a 5 mm port and sent for pathology. The pelvis was then examined with good hemostasis noted. The abdomen was desufflated and camera and ports were then removed. The port site skin incisions were closed in a subcuticular fashion with 4-0 monocryl and dermabond.    There were no complications during the procedure. All instrument and sponge counts were correct x2. The patient was awakened from anesthesia without difficult and went to the postanesthesia recovery room in stable condition.    Theresa Key DO

## 2022-07-21 NOTE — DISCHARGE INSTRUCTIONS
[facility]  Same-Day Surgery   Adult Discharge Orders & Instructions   For 24 hours after surgery  Get plenty of rest.  A responsible adult must stay with you for at least 24 hours after you leave the hospital.   Do not drive or use heavy equipment.  If you have weakness or tingling, don't drive or use heavy equipment until this feeling goes away.  Do not drink alcohol.  Avoid strenuous or risky activities.  Ask for help when climbing stairs.   You may feel lightheaded.  IF so, sit for a few minutes before standing.  Have someone help you get up.   If you have nausea (feel sick to your stomach): Drink only clear liquids such as apple juice, ginger ale, broth or 7-Up.  Rest may also help.  Be sure to drink enough fluids.  Move to a regular diet as you feel able.  You may have a slight fever. Call the doctor if your fever is over 100 F (37.7 C) (taken under the tongue) or lasts longer than 24 hours.  You may have a dry mouth, a sore throat, muscle aches or trouble sleeping.  These should go away after 24 hours.  Do not make important or legal decisions.   Call your doctor for any of the followin.  Signs of infection (fever, growing tenderness at the surgery site, a large amount of drainage or bleeding, severe pain, foul-smelling drainage, redness, swelling).  2. It has been over 8 to 10 hours since surgery and you are still not able to urinate (pass water).  To contact a doctor, call Dr. Key at [phone]   You received 975 mg tylenol at 9:30 AM, max 24 hour dose is 4,000 mg  No ibuprofen until 6:05 pm

## 2022-07-21 NOTE — ANESTHESIA CARE TRANSFER NOTE
Patient: Nickie Garcia    Procedure: Procedure(s):  SALPINGECTOMY, LAPAROSCOPIC bilateral Hysteroscopic intrauterine device removal       Diagnosis: Malpositioned IUD, sequela [T83.32XS]  Encounter for sterilization [Z30.2]  Diagnosis Additional Information: No value filed.    Anesthesia Type:   No value filed.     Note:      Level of Consciousness: awake  Oxygen Supplementation: face mask  Level of Supplemental Oxygen (L/min / FiO2): 6  Independent Airway: airway patency satisfactory and stable  Dentition: dentition unchanged  Vital Signs Stable: post-procedure vital signs reviewed and stable  Report to RN Given: handoff report given  Patient transferred to: PACU    Handoff Report: Identifed the Patient, Identified the Reponsible Provider, Reviewed the pertinent medical history, Discussed the surgical course, Reviewed Intra-OP anesthesia mangement and issues during anesthesia, Set expectations for post-procedure period and Allowed opportunity for questions and acknowledgement of understanding      Vitals:  Vitals Value Taken Time   /87 07/21/22 1222   Temp 98.3  F (36.8  C) 07/21/22 1222   Pulse 71 07/21/22 1226   Resp 20 07/21/22 1226   SpO2 95 % 07/21/22 1226   Vitals shown include unvalidated device data.    Electronically Signed By: JUNO Leon CRNA  July 21, 2022  12:27 PM

## 2022-07-21 NOTE — ANESTHESIA POSTPROCEDURE EVALUATION
Patient: Nickie Garcia    Procedure: Procedure(s):  SALPINGECTOMY, LAPAROSCOPIC bilateral Hysteroscopic intrauterine device removal       Anesthesia Type:  No value filed.    Note:  Disposition: Outpatient   Postop Pain Control: Uneventful            Sign Out: Well controlled pain   PONV: No   Neuro/Psych: Uneventful            Sign Out: Acceptable/Baseline neuro status   Airway/Respiratory: Uneventful            Sign Out: Acceptable/Baseline resp. status   CV/Hemodynamics: Uneventful            Sign Out: Acceptable CV status   Other NRE: NONE   DID A NON-ROUTINE EVENT OCCUR? No           Last vitals:  Vitals Value Taken Time   /76 07/21/22 1300   Temp 36.7  C (98  F) 07/21/22 1300   Pulse 69 07/21/22 1300   Resp 12 07/21/22 1300   SpO2 98 % 07/21/22 1300       Electronically Signed By: Jimmy Escobar MD  July 21, 2022  2:35 PM

## 2022-07-21 NOTE — ANESTHESIA PREPROCEDURE EVALUATION
Anesthesia Pre-Procedure Evaluation    Patient: Nickie Garcia   MRN: [id number] : 1980        Procedure : Procedure(s):  SALPINGECTOMY, LAPAROSCOPIC bilateral Hysteroscopic intrauterine device removal          Past Medical History:   Diagnosis Date     Anxiety disorder      Class 1 obesity due to excess calories with serious comorbidity and body mass index (BMI) of 30.0 to 30.9 in adult 2019     Discogenic low back pain 2019     Essential hypertension 2019    borderline     History of abnormal cervical Pap smear 20:  Pap NIL/HPV neg 2017: Pap LSIL HPV + (not 16/18)     Hypothyroid      Kidney stone      Multiple sclerosis (H) 2018    MS, relapsing, remitting:  Neurology.  Presented with optic neuritis.       Past Surgical History:   Procedure Laterality Date     HC COLP CERVIX/UPPER VAGINA       HC DILATION/CURETTAGE DIAG/THER NON OB  2017    benign polyp     HC INSERTION INTRAUTERINE DEVICE       HC REMOVE INTRAUTERINE DEVICE       HC TOOTH EXTRACTION W/FORCEP       PE TUBES       TONSILLECTOMY & ADENOIDECTOMY        Allergies   Allergen Reactions     Wellbutrin [Bupropion] Other (See Comments)     Optic neuritis      Social History     Tobacco Use     Smoking status: Current Every Day Smoker     Packs/day: 0.50     Smokeless tobacco: Never Used   Substance Use Topics     Alcohol use: No      Wt Readings from Last 1 Encounters:   07/15/22 87.6 kg (193 lb 3.2 oz)        Anesthesia Evaluation            ROS/MED HX  ENT/Pulmonary:       Neurologic:     (+) Multiple Sclerosis,     Cardiovascular:     (+) hypertension-----    METS/Exercise Tolerance:     Hematologic:       Musculoskeletal:       GI/Hepatic:       Renal/Genitourinary:       Endo:     (+) thyroid problem, hypothyroidism, Obesity,     Psychiatric/Substance Use:       Infectious Disease:       Malignancy:       Other:            Physical Exam    Airway  airway exam normal      Mallampati: II   TM  distance: > 3 FB   Neck ROM: full   Mouth opening: > 3 cm    Respiratory Devices and Support         Dental  no notable dental history         Cardiovascular          Rhythm and rate: regular and normal     Pulmonary   pulmonary exam normal        breath sounds clear to auscultation           OUTSIDE LABS:  CBC:   Lab Results   Component Value Date    WBC 13.0 (H) 11/09/2021    WBC 10.0 01/09/2020    HGB 14.7 11/09/2021    HGB 10.3 (L) 07/29/2020    HCT 44.0 11/09/2021    HCT 38.6 01/09/2020     11/09/2021     01/09/2020     BMP:   Lab Results   Component Value Date     11/09/2021     01/09/2020    POTASSIUM 3.1 (L) 11/09/2021    POTASSIUM 3.7 01/09/2020    CHLORIDE 107 11/09/2021    CHLORIDE 106 01/09/2020    CO2 27 11/09/2021    CO2 25 01/09/2020    BUN 14 11/09/2021    BUN 9 01/09/2020    CR 0.80 11/09/2021    CR 0.64 01/09/2020    GLC 90 11/09/2021    GLC 88 01/09/2020     COAGS: No results found for: PTT, INR, FIBR  POC:   Lab Results   Component Value Date    HCG Negative 07/21/2022     HEPATIC:   Lab Results   Component Value Date    ALBUMIN 4.0 11/09/2021    PROTTOTAL 7.7 11/09/2021     (H) 11/09/2021     (H) 11/09/2021    ALKPHOS 94 11/09/2021    BILITOTAL 0.4 11/09/2021     OTHER:   Lab Results   Component Value Date    A1C 4.8 12/16/2019    HALLEY 8.6 11/09/2021    PHOS 3.8 08/15/2012    LIPASE 60 05/30/2012    AMYLASE 85 05/30/2012    TSH 0.75 11/09/2021    T4 1.09 11/09/2021       Anesthesia Plan    ASA Status:  3   NPO Status:  NPO Appropriate    Anesthesia Type: General.     - Airway: ETT   Induction: Intravenous.   Maintenance: Balanced.        Consents    Anesthesia Plan(s) and associated risks, benefits, and realistic alternatives discussed. Questions answered and patient/representative(s) expressed understanding.    - Discussed:     - Discussed with:  Patient      - Extended Intubation/Ventilatory Support Discussed: No.      - Patient is DNR/DNI Status: No     Use of blood products discussed: No .     Postoperative Care    Pain management: IV analgesics, Oral pain medications, Multi-modal analgesia.   PONV prophylaxis: Ondansetron (or other 5HT-3), Background Propofol Infusion     Comments:                Jimmy Escobar MD

## 2022-07-25 ENCOUNTER — TELEPHONE (OUTPATIENT)
Dept: OBGYN | Facility: CLINIC | Age: 42
End: 2022-07-25

## 2022-07-25 LAB
PATH REPORT.COMMENTS IMP SPEC: NORMAL
PATH REPORT.COMMENTS IMP SPEC: NORMAL
PATH REPORT.FINAL DX SPEC: NORMAL
PATH REPORT.GROSS SPEC: NORMAL
PATH REPORT.MICROSCOPIC SPEC OTHER STN: NORMAL
PATH REPORT.RELEVANT HX SPEC: NORMAL
PHOTO IMAGE: NORMAL

## 2022-07-25 NOTE — TELEPHONE ENCOUNTER
Pt had SALPINGECTOMY, LAPAROSCOPIC bilateral Hysteroscopic intrauterine device removal on 7/21/2022.    Unable to reach patient via phone. RN left a message and instructed patient to call the clinic at [phone].    Radha Reynoso RN on 7/25/2022 at 8:25 AM

## 2022-07-25 NOTE — TELEPHONE ENCOUNTER
Pt calling as she has had bleeding x2 days after her procedure. Feels it is more than she should have. Transferred to triage, but pt wanted team informed as well. Please call to advise.

## 2022-07-25 NOTE — TELEPHONE ENCOUNTER
Pt had SALPINGECTOMY, LAPAROSCOPIC bilateral Hysteroscopic intrauterine device removal on 7/21/2022.    Pt having concerns for vaginal bleeding, changing a pad every 3 hours, bleeding started really light on 7/23, then increased a bit since 7/24. Pt denies passing any clots.    Pt having mild-moderate cramping, otherwise pain is controlled.    Pt denies vaginal pain, fevers, or pain/concerns at incision sites.    RN advised to continue to monitor, pt thinks bleeding is improving this AM already. RN advised to reach out if it worsens or if she has additional symptoms.    Patient verbalized understanding and agreed to plan.     Radha Reynoso RN on 7/25/2022 at 8:46 AM

## 2022-08-29 ENCOUNTER — TELEPHONE (OUTPATIENT)
Dept: NEUROLOGY | Facility: CLINIC | Age: 42
End: 2022-08-29

## 2022-08-29 NOTE — TELEPHONE ENCOUNTER
Sent Wolfgangt (1st Attempt) for the patient to call back and schedule the following:    Appointment type: Return  Provider:   Return date: D  Specialty phone number: [phone] or [phone]  Additional appointment(s) needed: none  Additonal Notes: patient is currently scheduled with Dr. Grove on 1/2/2023, to review the MRIs.    [facility]   Neurology, NeuroSurgery, NeuroPsychology and Pain Management Specialties  [phone]

## 2022-11-22 ENCOUNTER — E-VISIT (OUTPATIENT)
Dept: FAMILY MEDICINE | Facility: CLINIC | Age: 42
End: 2022-11-22
Payer: COMMERCIAL

## 2022-11-22 DIAGNOSIS — F17.210 CIGARETTE NICOTINE DEPENDENCE WITHOUT COMPLICATION: Primary | ICD-10-CM

## 2022-11-22 PROCEDURE — 99421 OL DIG E/M SVC 5-10 MIN: CPT | Performed by: NURSE PRACTITIONER

## 2022-11-28 ENCOUNTER — E-VISIT (OUTPATIENT)
Dept: URGENT CARE | Facility: CLINIC | Age: 42
End: 2022-11-28
Payer: COMMERCIAL

## 2022-11-28 DIAGNOSIS — B37.31 CANDIDAL VULVOVAGINITIS: Primary | ICD-10-CM

## 2022-11-28 PROCEDURE — 99421 OL DIG E/M SVC 5-10 MIN: CPT | Performed by: FAMILY MEDICINE

## 2022-11-28 RX ORDER — FLUCONAZOLE 150 MG/1
150 TABLET ORAL ONCE
Qty: 1 TABLET | Refills: 0 | Status: SHIPPED | OUTPATIENT
Start: 2022-11-28 | End: 2022-11-28

## 2022-11-28 NOTE — PATIENT INSTRUCTIONS
Thank you for choosing us for your care. I have placed an order for a prescription so that you can start treatment. View your full visit summary for details by clicking on the link below. Your pharmacist will able to address any questions you may have about the medication.     If you re not feeling better within 2-3 days, please schedule an appointment.  You can schedule an appointment right here in [id number], or call [phone]  If the visit is for the same symptoms as your eVisit, we ll refund the cost of your eVisit if seen within seven days.      Yeast Infection (Candida Vaginal Infection)    You have a Candida vaginal infection. This is also known as a yeast infection. It's most often caused by a type of yeast (fungus) called Candida. Candida are normally found in the vagina. But if they increase in number, this can lead to infection and cause symptoms.   Symptoms of a yeast infection can include:     Clumpy or thin, white discharge, which may look like cottage cheese    Itching or burning    Burning with urination  Certain factors can make a yeast infection more likely. These can include:     Taking certain medicines, such as antibiotics or birth control pills    Pregnancy    Diabetes    Weak immune system  A yeast infection is most often treated with antifungal medicine. This may be given as a vaginal cream or pills you take by mouth. Treatment may last for about 1 to 7 days. Women with severe or recurrent infections may need longer courses of treatment.   Home care    If you re prescribed medicine, be sure to use it as directed. Finish all of the medicine, even if your symptoms go away. Don t try to treat yourself using over-the-counter products without talking with your provider first. They will let you know if this is a good option for you.    Ask your provider what steps you can take to help reduce your risk of having a yeast infection in the future.    Follow-up care  Follow up with your healthcare  provider, or as directed.   When to seek medical advice  Call your healthcare provider right away if:     You have a fever of 100.4 F (38 C) or higher, or as directed by your provider.    Your symptoms worsen, or they don t go away within a few days of starting treatment.    You have new pain in the lower belly or pelvic region.    You have side effects that bother you or a reaction to the cream or pills you re prescribed.    You or any partners you have sex with have new symptoms, such as a rash, joint pain, or sores.  [employer] last reviewed this educational content on 7/1/2020 2000-2021 [employer]. All rights reserved. This information is not intended as a substitute for professional medical care. Always follow your healthcare professional's instructions.

## 2022-12-07 NOTE — PROGRESS NOTES
Nickie is a 41 year old who is being evaluated via a billable video visit.      How would you like to obtain your AVS? [employer]  If the video visit is dropped, the invitation should be resent by: Other e-mail: [email or web address]  Will anyone else be joining your video visit? No      Video Start Time: 3:03 PM  Video-Visit Details    Type of service:  Video Visit    Video End Time:3:32 PM    Originating Location (pt. Location): Home    Distant Location (provider location):  [facility] MULTIPLE SCLEROSIS CLINIC [place]     Platform used for Video Visit: [employer]  THE [facility] MULTIPLE SCLEROSIS CLINIC  FOLLOW UP VISIT           PRINCIPAL NEUROLOGIC DIAGNOSIS: Multiple Sclerosis        HISTORY OF ILLNESS:    This is a follow visit for this 41 year old right handed genetic female  With a history of MS. Who was last seen on  10-17-19.   At that time the patient was recommended to:    ASSESSMENT:     RRMS clinically stable off Copaxone. Fatigue an issue.     PLAN:     Repeat MRI's B, C and T spine w/wo contrast at [facility]     Re-start Copaxone, we will complete form today     Start Vit D3 5000 international unit(s) a day     Omega 3 FA     Multivitamin     MRA will be obtained since she has a strong FH of aneurysms.     Since last visit she has been doing well and became pregnant in 2019, giving birth to a healthy boy in 2020.  I suspect that this was a very stressful time not only due to the pandemic but also because the baby was induced and eventually she had to undergo a .  She reports that the baby is currently 15 months and is he is very difficult in terms of still not sleeping through the night, very dependent on mom's attention and making his situation somewhat tense.    She reports no symptoms in the postpartum but approximately 2 days ago she started developing numbness in the feet and shins this seems to be getting worse, she also noticed some leg heaviness this  morning.    She has been off Copaxone since 2018.    She had COVID-19 in August of this year everyone in the family had it but not the baby, she underwent monoclonal antibodies as a treatment.    She would like to restart Copaxone and is wondering if she needs IV steroids.  Today I explained that in general if the symptoms are mild we do not really treat sensory symptoms with IV steroids but it all depends on how they behave over the next couple of days.  Additionally will be important for her to undergo an MRI of the brain cervical and thoracic spine as previously recommended neurology to be baseline prior to starting Copaxone but also to see if there is any inflammatory activity they would require steroids.    We also spoke about Aubagio, efficacy side effects and some monitoring needs were discussed in detail and she is willing to review the information that will be mailed to her.    We also discussed the importance of understanding how stressful pregnancy and delivery were for her but also for the baby and to spend some time improving the relationship and decreasing the tension between them.    She works at [employer] and was encouraged to consider seeing at rest mostly for convenience, I will be happy to continue to see her at [facility] if transfer she prefers.      Current Outpatient Prescriptions:  Current Outpatient Medications   Medication     amLODIPine (NORVASC) 5 MG tablet     ibuprofen (ADVIL/MOTRIN) 600 MG tablet     levonorgestrel (MIRENA, 52 MG,) 20 MCG/24HR IUD     SYNTHROID 200 MCG tablet     No current facility-administered medications for this visit.          ALLERGIES       Allergies   Allergen Reactions     Wellbutrin [Bupropion] Other (See Comments)     Optic neuritis           Impression:    Relapsing remitting multiple sclerosis, off Copaxone since 2018, status post pregnancy from November 2019 to August 2020  without complications.  Currently experiencing a mild sensory  exacerbation.    Plan:    Obtain MRI of the brain cervical and thoracic spine with and without contrast to confirm disease activity from the imaging standpoint and rebaseline.  Start prednisone at 1000 mg in the form of 20, (50 mg tablets) a day for the next 3 days.    Restart Copaxone, alternatively consider Aubagio 14 mg a day.  Efficacy, side effects and monitoring needs were discussed in some detail and will be discussed further after she reviews the information provided.        Finally I will follow the patient up in 4 month(s) as long as the patient is doing well. I instructed the patient to call or mychart my office with any concerns or questions.    I spent 30 minutes in this visit, with >50% direct patient time spent counseling about prognosis, treatment options, and coordination of care.     My recommendations will be communicated back to the patient's physician(s) by mail.  Follow-up is expected to be with me.      Liz Franco MD  Chief, Multiple Sclerosis Division  Department of Neurology  [facility]      BILLING TIME DOCUMENTATION:   The total TIME spent on this patient on the date of the encounter/appointment was 30 minutes.       TOTAL TIME includes:   Time spent preparing to see the patient (reviewing records and tests)   Time spent face to face (or over the phone) with the patient   Time spent ordering tests, medications, procedures and referrals  Time spent documenting clinical information in Epic  Time discussing the case with other providers           English

## 2023-01-07 ENCOUNTER — HEALTH MAINTENANCE LETTER (OUTPATIENT)
Age: 43
End: 2023-01-07

## 2023-02-14 ENCOUNTER — ANCILLARY ORDERS (OUTPATIENT)
Dept: MAMMOGRAPHY | Facility: CLINIC | Age: 43
End: 2023-02-14

## 2023-02-14 ENCOUNTER — ANCILLARY PROCEDURE (OUTPATIENT)
Dept: MAMMOGRAPHY | Facility: CLINIC | Age: 43
End: 2023-02-14
Attending: NURSE PRACTITIONER
Payer: COMMERCIAL

## 2023-02-14 DIAGNOSIS — Z12.31 VISIT FOR SCREENING MAMMOGRAM: ICD-10-CM

## 2023-02-14 PROCEDURE — 77063 BREAST TOMOSYNTHESIS BI: CPT | Performed by: RADIOLOGY

## 2023-02-14 PROCEDURE — 77067 SCR MAMMO BI INCL CAD: CPT | Performed by: RADIOLOGY

## 2023-03-27 ENCOUNTER — E-VISIT (OUTPATIENT)
Dept: URGENT CARE | Facility: URGENT CARE | Age: 43
End: 2023-03-27
Payer: COMMERCIAL

## 2023-03-27 DIAGNOSIS — N39.0 ACUTE UTI (URINARY TRACT INFECTION): Primary | ICD-10-CM

## 2023-03-27 PROCEDURE — 99421 OL DIG E/M SVC 5-10 MIN: CPT | Performed by: PHYSICIAN ASSISTANT

## 2023-03-27 RX ORDER — NITROFURANTOIN 25; 75 MG/1; MG/1
100 CAPSULE ORAL 2 TIMES DAILY
Qty: 10 CAPSULE | Refills: 0 | Status: SHIPPED | OUTPATIENT
Start: 2023-03-27 | End: 2023-04-01

## 2023-03-27 NOTE — PATIENT INSTRUCTIONS
Dear Nickie Garcia    After reviewing your responses, I've been able to diagnose you with a urinary tract infection, which is a common infection of the bladder with bacteria.  This is not a sexually transmitted infection, though urinating immediately after intercourse can help prevent infections.  Drinking lots of fluids is also helpful to clear your current infection and prevent the next one.      I have sent a prescription for antibiotics to your pharmacy to treat this infection.    It is important that you take all of your prescribed medication even if your symptoms are improving after a few doses.  Taking all of your medicine helps prevent the symptoms from returning.     If your symptoms worsen, you develop pain in your back or stomach, develop fevers, or are not improving in 5 days, please contact your primary care provider for an appointment or visit any of our convenient Walk-in or Urgent Care Centers to be seen, which can be found on our website here.    Thanks again for choosing us as your health care partner,    Nataliia Caballero PA-C    Urinary Tract Infections in Women  Urinary tract infections (UTIs) are most often caused by bacteria. These bacteria enter the urinary tract. The bacteria may come from inside the body. Or they may travel from the skin outside the rectum or vagina into the urethra. Female anatomy makes it easy for bacteria from the bowel to enter a woman s urinary tract, which is the most common source of UTI. This means women develop UTIs more often than men. Pain in or around the urinary tract is a common UTI symptom. But the only way to know for sure if you have a UTI for the healthcare provider to test your urine. The two tests that may be done are the urinalysis and urine culture.    Types of UTIs    Cystitis. A bladder infection (cystitis) is the most common UTI in women. You may have urgent or frequent need to pee. You may also have pain, burning when you pee, and bloody  urine.    Urethritis. This is an inflamed urethra, which is the tube that carries urine from the bladder to outside the body. You may have lower stomach or back pain. You may also have urgent or frequent need to pee.    Pyelonephritis. This is a kidney infection. If not treated, it can be serious and damage your kidneys. In severe cases, you may need to stay in the hospital. You may have a fever and lower back pain.    Medicines to treat a UTI  Most UTIs are treated with antibiotics. These kill the bacteria. The length of time you need to take them depends on the type of infection. It may be as short as 3 days. If you have repeated UTIs, you may need a low-dose antibiotic for several months. Take antibiotics exactly as directed. Don t stop taking them until all of the medicine is gone, even if you feel better. If you stop taking the antibiotic too soon, the infection may not go away. You may also develop a resistance to the antibiotic. This can make it much harder to treat.  Lifestyle changes to treat and prevent UTIs  The lifestyle changes below will help get rid of your UTI. They may also help prevent future UTIs.    Drink plenty of fluids. This includes water, juice, or other caffeine-free drinks. Fluids help flush bacteria out of your body.    Empty your bladder. Always empty your bladder when you feel the urge to pee. And always pee before going to sleep. Urine that stays in your bladder can lead to infection. Try to pee before and after sex as well.    Practice good personal hygiene. Wipe yourself from front to back after using the toilet. This helps keep bacteria from getting into the urethra.    Wear cotton underwear. Don't wear synthetic or tight-fitting underwear that can trap moisture. Change out of wet bathing suits and workout clothing quickly.    Take showers. Showers are better than baths for preventing UTIs.    Use condoms during sex. These help prevent UTIs caused by sexually transmitted bacteria.  Also don't use spermicides during sex. These can increase the risk for UTIs. Choose other forms of birth control instead. For women who tend to get UTIs after sex, a low-dose of a preventive antibiotic may be used. Be sure to discuss this option with your healthcare provider.    Follow up with your healthcare provider as directed. They may test to make sure the infection has cleared. If needed, more treatment may be started.  [employer] last reviewed this educational content on 9/1/2021 2000-2022 [employer]. All rights reserved. This information is not intended as a substitute for professional medical care. Always follow your healthcare professional's instructions.

## 2023-04-18 ENCOUNTER — VIRTUAL VISIT (OUTPATIENT)
Dept: FAMILY MEDICINE | Facility: CLINIC | Age: 43
End: 2023-04-18
Payer: COMMERCIAL

## 2023-04-18 DIAGNOSIS — Z13.0 SCREENING FOR DISORDER OF BLOOD AND BLOOD-FORMING ORGANS: ICD-10-CM

## 2023-04-18 DIAGNOSIS — F17.210 CIGARETTE NICOTINE DEPENDENCE WITHOUT COMPLICATION: ICD-10-CM

## 2023-04-18 DIAGNOSIS — Z13.6 CARDIOVASCULAR SCREENING; LDL GOAL LESS THAN 130: ICD-10-CM

## 2023-04-18 DIAGNOSIS — I10 ESSENTIAL HYPERTENSION: Primary | ICD-10-CM

## 2023-04-18 DIAGNOSIS — E03.9 HYPOTHYROIDISM, UNSPECIFIED TYPE: ICD-10-CM

## 2023-04-18 DIAGNOSIS — E06.3 HYPOTHYROIDISM DUE TO HASHIMOTO'S THYROIDITIS: ICD-10-CM

## 2023-04-18 PROCEDURE — 99207 PR NO CHARGE LOS: CPT | Mod: VID | Performed by: NURSE PRACTITIONER

## 2023-04-18 RX ORDER — AMLODIPINE BESYLATE 5 MG/1
5 TABLET ORAL DAILY
Qty: 90 TABLET | Refills: 0 | Status: SHIPPED | OUTPATIENT
Start: 2023-04-18 | End: 2023-10-10

## 2023-04-18 RX ORDER — LEVOTHYROXINE SODIUM 200 MCG
TABLET ORAL
Qty: 90 TABLET | Refills: 0 | Status: SHIPPED | OUTPATIENT
Start: 2023-04-18 | End: 2023-05-03

## 2023-04-18 NOTE — Clinical Note
Please help schedule for in person visit in about 6 to 8 weeks OK to use a same day slot  Thanks Janeth

## 2023-04-18 NOTE — PROGRESS NOTES
Nickie is a 43 year old who is being evaluated via a billable video visit.      How would you like to obtain your AVS? MyChart  If the video visit is dropped, the invitation should be resent by: Text to cell phone: [phone]  Will anyone else be joining your video visit? No      Internet not working will reschedule appointment       Subjective   Nickie is a 43 year old, presenting for the following health issues:  Refill Request    History of Present Illness       Hypertension: She presents for follow up of hypertension.  She does not check blood pressure  regularly outside of the clinic. Outside blood pressures have been over 140/90. She does not follow a low salt diet.     Hypothyroidism:     Since last visit, patient describes the following symptoms::  None    She eats 2-3 servings of fruits and vegetables daily.She consumes 3 sweetened beverage(s) daily.She exercises with enough effort to increase her heart rate 9 or less minutes per day.  She exercises with enough effort to increase her heart rate 3 or less days per week. She is missing 5 dose(s) of medications per week.  She is not taking prescribed medications regularly due to remembering to take.    Lab work is in process  Labs reviewed in EPIC  BP Readings from Last 3 Encounters:   07/21/22 111/72   07/15/22 135/86   07/06/22 (!) 144/95    Wt Readings from Last 3 Encounters:   07/15/22 87.6 kg (193 lb 3.2 oz)   07/06/22 86.4 kg (190 lb 6.4 oz)   07/01/22 86.9 kg (191 lb 9.6 oz)                  Patient Active Problem List   Diagnosis     CARDIOVASCULAR SCREENING; LDL GOAL LESS THAN 160     Hashimotos thyroiditis     Vitamin D deficiency     Anxiety disorder     Multiple sclerosis (H)     History of abnormal cervical Pap smear     Essential hypertension     Antepartum multigravida of advanced maternal age     Discogenic low back pain     Kidney stone     Class 1 obesity due to excess calories with serious comorbidity and body mass index (BMI) of 30.0 to  30.9 in adult     Anemia     Delayed postpartum hemorrhage     Malpositioned IUD, sequela     Encounter for sterilization     Past Surgical History:   Procedure Laterality Date     HC COLP CERVIX/UPPER VAGINA  2017     HC DILATION/CURETTAGE DIAG/THER NON OB  2017    benign polyp     HC INSERTION INTRAUTERINE DEVICE  2012     HC REMOVE INTRAUTERINE DEVICE  2012     HC TOOTH EXTRACTION W/FORCEP       LAPAROSCOPIC SALPINGECTOMY Bilateral 7/21/2022    Procedure: SALPINGECTOMY, LAPAROSCOPIC bilateral Hysteroscopic intrauterine device removal;  Surgeon: Theresa Key DO;  Location: [facility] OR     PE TUBES       TONSILLECTOMY & ADENOIDECTOMY         Social History     Tobacco Use     Smoking status: Every Day     Packs/day: 0.50     Types: Cigarettes     Smokeless tobacco: Never   Vaping Use     Vaping status: Never Used   Substance Use Topics     Alcohol use: No     Family History   Problem Relation Age of Onset     Hypertension Father      Cerebrovascular Disease Father 54        hemorrhagic stroke d/t rupture of brain aneurysm     Aneurysm Father      Neurologic Disorder Mother         ms     No Known Problems Sister      Cancer Maternal Grandmother         brain     Multiple Sclerosis Maternal Grandfather      Heart Disease Paternal Grandmother         chf     Diabetes Paternal Grandfather      Diabetes Other      Cancer Paternal Uncle         cancer in eye and liver          Current Outpatient Medications   Medication Sig Dispense Refill     amLODIPine (NORVASC) 5 MG tablet Take 1 tablet (5 mg) by mouth daily 90 tablet 3     ibuprofen (ADVIL/MOTRIN) 600 MG tablet Take 1 tablet (600 mg) by mouth every 6 hours as needed (for mild to moderate pain) 30 tablet 0     ibuprofen (ADVIL/MOTRIN) 800 MG tablet Take 1 tablet (800 mg) by mouth every 6 hours as needed for other (mild and/or inflammatory pain) 30 tablet 0     oxyCODONE (ROXICODONE) 5 MG tablet Take 1-2 tablets (5-10 mg) by mouth every 4 hours as needed for moderate  "to severe pain 6 tablet 0     SYNTHROID 200 MCG tablet Take one tab every day x 6 days and 1/2 tab on 1 day 90 tablet 0     varenicline (CHANTIX BEHZAD) 0.5 MG X 11 & 1 MG X 42 tablet Take 0.5 mg tab daily for 3 days, THEN 0.5 mg tab twice daily for 4 days, THEN 1 mg twice daily. 53 tablet 0     Allergies   Allergen Reactions     Wellbutrin [Bupropion] Other (See Comments)     Optic neuritis         Objective         Vitals:  No vitals were obtained today due to virtual visit.  BP Readings from Last 1 Encounters:   07/21/22 111/72     Pulse Readings from Last 1 Encounters:   07/21/22 69     Wt Readings from Last 1 Encounters:   07/15/22 87.6 kg (193 lb 3.2 oz)     Ht Readings from Last 1 Encounters:   07/15/22 1.727 m (5' 8\")     Estimated body mass index is 29.38 kg/m  as calculated from the following:    Height as of 7/15/22: 1.727 m (5' 8\").    Weight as of 7/15/22: 87.6 kg (193 lb 3.2 oz).    Temp Readings from Last 1 Encounters:   07/21/22 98  F (36.7  C) (Temporal)   Assessment & Plan     Essential hypertension  - Albumin Random Urine Quantitative with Creat Ratio  - Comprehensive metabolic panel    Hypothyroidism, unspecified type  - TSH  - T4 free    CARDIOVASCULAR SCREENING; LDL GOAL LESS THAN 130  - Lipid panel reflex to direct LDL Fasting    Screening for disorder of blood and blood-forming organs  - CBC with platelets     Time spent by me doing chart review, history and exam, documentation and further activities per the note       Nicotine/Tobacco Cessation:  She reports that she has been smoking. She has been smoking an average of .5 packs per day. She has never used smokeless tobacco.  Nicotine/Tobacco Cessation Plan:         BMI:   Estimated body mass index is 29.38 kg/m  as calculated from the following:    Height as of 7/15/22: 1.727 m (5' 8\").    Weight as of 7/15/22: 87.6 kg (193 lb 3.2 oz).       See Patient Instructions      JUNO Knowles CNP  [facility]        "

## 2023-04-26 ENCOUNTER — TELEPHONE (OUTPATIENT)
Dept: FAMILY MEDICINE | Facility: CLINIC | Age: 43
End: 2023-04-26
Payer: COMMERCIAL

## 2023-04-26 NOTE — TELEPHONE ENCOUNTER
Prior Authorization Request    SYNTHROID 200 MCG tablet      Key:[id number]    Pharmacy Note: Please notify the pharmacy when you receive a determination from the plan.

## 2023-06-12 ENCOUNTER — TELEPHONE (OUTPATIENT)
Dept: NEUROLOGY | Facility: CLINIC | Age: 43
End: 2023-06-12

## 2023-06-12 ENCOUNTER — HOSPITAL ENCOUNTER (EMERGENCY)
Facility: CLINIC | Age: 43
Discharge: HOME | End: 2023-06-12
Attending: STUDENT IN AN ORGANIZED HEALTH CARE EDUCATION/TRAINING PROGRAM | Admitting: STUDENT IN AN ORGANIZED HEALTH CARE EDUCATION/TRAINING PROGRAM
Payer: COMMERCIAL

## 2023-06-12 VITALS
SYSTOLIC BLOOD PRESSURE: 156 MMHG | TEMPERATURE: 98.2 F | BODY MASS INDEX: 28.14 KG/M2 | HEIGHT: 69 IN | DIASTOLIC BLOOD PRESSURE: 117 MMHG | RESPIRATION RATE: 18 BRPM | OXYGEN SATURATION: 98 % | WEIGHT: 190 LBS | HEART RATE: 89 BPM

## 2023-06-12 DIAGNOSIS — G35 MULTIPLE SCLEROSIS EXACERBATION (H): ICD-10-CM

## 2023-06-12 LAB
ALBUMIN SERPL BCG-MCNC: 4.6 G/DL (ref 3.5–5.2)
ALP SERPL-CCNC: 80 U/L (ref 35–104)
ALT SERPL W P-5'-P-CCNC: 11 U/L (ref 10–35)
ANION GAP SERPL CALCULATED.3IONS-SCNC: 13 MMOL/L (ref 7–15)
AST SERPL W P-5'-P-CCNC: 18 U/L (ref 10–35)
BASOPHILS # BLD AUTO: 0 10E3/UL (ref 0–0.2)
BASOPHILS NFR BLD AUTO: 1 %
BILIRUB SERPL-MCNC: 0.6 MG/DL
BUN SERPL-MCNC: 8.1 MG/DL (ref 6–20)
CALCIUM SERPL-MCNC: 9.6 MG/DL (ref 8.6–10)
CHLORIDE SERPL-SCNC: 107 MMOL/L (ref 98–107)
CREAT SERPL-MCNC: 0.75 MG/DL (ref 0.51–0.95)
CRP SERPL-MCNC: <3 MG/L
DEPRECATED HCO3 PLAS-SCNC: 23 MMOL/L (ref 22–29)
EOSINOPHIL # BLD AUTO: 0.1 10E3/UL (ref 0–0.7)
EOSINOPHIL NFR BLD AUTO: 1 %
ERYTHROCYTE [DISTWIDTH] IN BLOOD BY AUTOMATED COUNT: 11.9 % (ref 10–15)
ERYTHROCYTE [SEDIMENTATION RATE] IN BLOOD BY WESTERGREN METHOD: 3 MM/HR (ref 0–20)
GFR SERPL CREATININE-BSD FRML MDRD: >90 ML/MIN/1.73M2
GLUCOSE SERPL-MCNC: 96 MG/DL (ref 70–99)
HCT VFR BLD AUTO: 47.9 % (ref 35–47)
HGB BLD-MCNC: 15.7 G/DL (ref 11.7–15.7)
IMM GRANULOCYTES # BLD: 0 10E3/UL
IMM GRANULOCYTES NFR BLD: 0 %
LYMPHOCYTES # BLD AUTO: 2.2 10E3/UL (ref 0.8–5.3)
LYMPHOCYTES NFR BLD AUTO: 27 %
MCH RBC QN AUTO: 31.5 PG (ref 26.5–33)
MCHC RBC AUTO-ENTMCNC: 32.8 G/DL (ref 31.5–36.5)
MCV RBC AUTO: 96 FL (ref 78–100)
MONOCYTES # BLD AUTO: 0.4 10E3/UL (ref 0–1.3)
MONOCYTES NFR BLD AUTO: 5 %
NEUTROPHILS # BLD AUTO: 5.3 10E3/UL (ref 1.6–8.3)
NEUTROPHILS NFR BLD AUTO: 66 %
NRBC # BLD AUTO: 0 10E3/UL
NRBC BLD AUTO-RTO: 0 /100
PLATELET # BLD AUTO: 238 10E3/UL (ref 150–450)
POTASSIUM SERPL-SCNC: 3.9 MMOL/L (ref 3.4–5.3)
PROT SERPL-MCNC: 7.8 G/DL (ref 6.4–8.3)
RBC # BLD AUTO: 4.98 10E6/UL (ref 3.8–5.2)
SODIUM SERPL-SCNC: 143 MMOL/L (ref 136–145)
TSH SERPL DL<=0.005 MIU/L-ACNC: 0.78 UIU/ML (ref 0.3–4.2)
WBC # BLD AUTO: 8 10E3/UL (ref 4–11)

## 2023-06-12 PROCEDURE — 84443 ASSAY THYROID STIM HORMONE: CPT | Performed by: STUDENT IN AN ORGANIZED HEALTH CARE EDUCATION/TRAINING PROGRAM

## 2023-06-12 PROCEDURE — 80053 COMPREHEN METABOLIC PANEL: CPT | Performed by: STUDENT IN AN ORGANIZED HEALTH CARE EDUCATION/TRAINING PROGRAM

## 2023-06-12 PROCEDURE — 85652 RBC SED RATE AUTOMATED: CPT | Performed by: STUDENT IN AN ORGANIZED HEALTH CARE EDUCATION/TRAINING PROGRAM

## 2023-06-12 PROCEDURE — 99284 EMERGENCY DEPT VISIT MOD MDM: CPT | Performed by: STUDENT IN AN ORGANIZED HEALTH CARE EDUCATION/TRAINING PROGRAM

## 2023-06-12 PROCEDURE — 99283 EMERGENCY DEPT VISIT LOW MDM: CPT | Performed by: STUDENT IN AN ORGANIZED HEALTH CARE EDUCATION/TRAINING PROGRAM

## 2023-06-12 PROCEDURE — 99207 PR SERVICE NOT STAFFED W/SUPERV PROV: CPT | Performed by: OPHTHALMOLOGY

## 2023-06-12 PROCEDURE — 36415 COLL VENOUS BLD VENIPUNCTURE: CPT | Performed by: STUDENT IN AN ORGANIZED HEALTH CARE EDUCATION/TRAINING PROGRAM

## 2023-06-12 PROCEDURE — 99222 1ST HOSP IP/OBS MODERATE 55: CPT | Mod: GC | Performed by: STUDENT IN AN ORGANIZED HEALTH CARE EDUCATION/TRAINING PROGRAM

## 2023-06-12 PROCEDURE — 86140 C-REACTIVE PROTEIN: CPT | Performed by: STUDENT IN AN ORGANIZED HEALTH CARE EDUCATION/TRAINING PROGRAM

## 2023-06-12 PROCEDURE — 85025 COMPLETE CBC W/AUTO DIFF WBC: CPT | Performed by: STUDENT IN AN ORGANIZED HEALTH CARE EDUCATION/TRAINING PROGRAM

## 2023-06-12 RX ORDER — PREDNISONE 50 MG/1
1250 TABLET ORAL DAILY
Qty: 125 TABLET | Refills: 0 | Status: SHIPPED | OUTPATIENT
Start: 2023-06-12 | End: 2023-06-17

## 2023-06-12 RX ORDER — PANTOPRAZOLE SODIUM 40 MG/1
40 TABLET, DELAYED RELEASE ORAL DAILY
Qty: 5 TABLET | Refills: 0 | Status: SHIPPED | OUTPATIENT
Start: 2023-06-12 | End: 2023-06-17

## 2023-06-12 ASSESSMENT — ACTIVITIES OF DAILY LIVING (ADL)
ADLS_ACUITY_SCORE: 35

## 2023-06-12 ASSESSMENT — VISUAL ACUITY
OD: 20/25;WITHOUT CORRECTIVE LENSES
OS: 20/30;WITHOUT CORRECTIVE LENSES

## 2023-06-12 NOTE — ED PROVIDER NOTES
"    [place] [facility] DEPARTMENT ([facility])    6/12/23       ED PROVIDER NOTE    History     Chief Complaint   Patient presents with     Eye Problem     The history is provided by the patient and medical records.     Nickie Garcia is a 43 year old female with past medical history significant for MS, optic neuritis, Hashimoto's thyroiditis, hypothyroidism, HTN, kidney stone, vitamin D deficiency, anxiety who presents to the ED referred by neuro ophthalmology for blurred and double vision.  The patient reports that her symptoms started with photophobia and dry eyes on 6/7.  On 6/9, she developed blurry and double vision and went to [facility] emergency care center 6/10.  MRI brain was obtained with and without contrast which showed no new enhancements.  Her case was discussed with on-call neurology who did not recommend steroid burst at that time.  She was treated as a potential vertigo with meclizine at that time and told to follow-up with the neurologist.  She was seen by ophthalmology in [place] today who consulted with neuro-ophthalmology (Dr. Naranjo per patient) and referred her to [place] ED.  Here, patient continues to endorse blurry and double vision, and dizziness that she describes as \"loss of balance\".  She denies numbness, tingling, or weakness.  She does not have a current neurologist or neuro-ophthalmologist.  She states she is a previous patient of Dr. Grove, whom she last saw a year and a half ago, and is trying to establish care with Dr. LUCIANA Cronin at the [facility].  She reports ophthalmology was concerned about possible thyroid eye disease.    MRI Brain w&w/o con 06/10/2023  IMPRESSION: Scattered white matter lesions compatible with clinical diagnosis of multiple sclerosis, mildly progressed since 1/29/2018. Observations include:   1.  T2 lesion burden: Mild, with 4 new white matter lesions at the left posterior callosal body, right lateral callosal splenium and " bilateral periatrial white matters.   2.  T1 lesion burden: Mild, increased.   3.  Lesion enhancement: No new enhancement. Suspect decrease with subtle asymmetric low-grade enhancement at the left optic nerve at the level of the orbital apex. Other previous enhancement has resolved.   4.  Parenchymal loss: None.       Past Medical History  Past Medical History:   Diagnosis Date     Anxiety disorder 2012     Class 1 obesity due to excess calories with serious comorbidity and body mass index (BMI) of 30.0 to 30.9 in adult 2019     Discogenic low back pain 2019     Essential hypertension 09/05/2019    borderline     History of abnormal cervical Pap smear 2017 4/13/18:  Pap NIL/HPV neg 2/2017: Pap LSIL HPV + (not 16/18)     Hypothyroid 2002     Kidney stone 2019     Multiple sclerosis (H) 2018    MS, relapsing, remitting:  Neurology.  Presented with optic neuritis.      Past Surgical History:   Procedure Laterality Date     HC COLP CERVIX/UPPER VAGINA  2017     HC DILATION/CURETTAGE DIAG/THER NON OB  2017    benign polyp     HC INSERTION INTRAUTERINE DEVICE  2012     HC REMOVE INTRAUTERINE DEVICE  2012     HC TOOTH EXTRACTION W/FORCEP       LAPAROSCOPIC SALPINGECTOMY Bilateral 7/21/2022    Procedure: SALPINGECTOMY, LAPAROSCOPIC bilateral Hysteroscopic intrauterine device removal;  Surgeon: Theresa Key DO;  Location: [facility] OR     PE TUBES       TONSILLECTOMY & ADENOIDECTOMY       pantoprazole (PROTONIX) 40 MG EC tablet  predniSONE (DELTASONE) 50 MG tablet  amLODIPine (NORVASC) 5 MG tablet  ibuprofen (ADVIL/MOTRIN) 600 MG tablet  ibuprofen (ADVIL/MOTRIN) 800 MG tablet  levothyroxine (SYNTHROID) 200 MCG tablet  oxyCODONE (ROXICODONE) 5 MG tablet  varenicline (CHANTIX BEHZAD) 0.5 MG X 11 & 1 MG X 42 tablet      Allergies   Allergen Reactions     Wellbutrin [Bupropion] Other (See Comments)     Optic neuritis     Family History  Family History   Problem Relation Age of Onset     Hypertension Father      Cerebrovascular  "Disease Father 54        hemorrhagic stroke d/t rupture of brain aneurysm     Aneurysm Father      Neurologic Disorder Mother         ms     No Known Problems Sister      Cancer Maternal Grandmother         brain     Multiple Sclerosis Maternal Grandfather      Heart Disease Paternal Grandmother         chf     Diabetes Paternal Grandfather      Diabetes Other      Cancer Paternal Uncle         cancer in eye and liver      Social History   Social History     Tobacco Use     Smoking status: Every Day     Packs/day: 0.50     Types: Cigarettes     Smokeless tobacco: Never   Vaping Use     Vaping status: Never Used   Substance Use Topics     Alcohol use: No     Drug use: No      Past medical history, past surgical history, medications, allergies, family history, and social history were reviewed with the patient. No additional pertinent items.      A medically appropriate review of systems was performed with pertinent positives and negatives noted in the HPI, and all other systems negative.    Physical Exam   BP: (!) 156/117  Pulse: 89  Temp: 98.2  F (36.8  C)  Resp: 18  Height: 175.3 cm (5' 9\")  Weight: 86.2 kg (190 lb)  SpO2: 98 %  Physical Exam  Vital Signs Reviewed  Gen: Well nourished, well developed, resting comfortably, no acute distress  HEENT: NC/AT, PERRL, MMM  Neck: Supple, FROM  CV: Regular Rate, no murmur/rub/gallop  Lungs/Chest: Normal Effort, CTAB  Abd: Non-distended, non-tender  MSK/Back: FROM, no visible deformity  Neuro: A&Ox3, GCS 15, CN II-XII unremarkable  Skin: Warm, Dry, Intact, no visible lesions    ED Course, Procedures, & Data   3:12 PM  The patient was seen and examined by Lucio Wellington MD in Room [facility].   ED Course as of 06/12/23 2333   Mon Jun 12, 2023   1523 Patient seen and evaluated in [facility]. Labs ordered. Outside records reviewed. Paging neuro optho. Told to come to ED by Dr. Naranjo while at [facility] per pt.   Patient seen and eval by ophthalmology.  Patient with " internuclear ophthalmoplegia.  Suspects neurologic process  Patient seen and evaluated by neurology.  Suspicion for MS flare.  Patient does not want to undergo additional thin slice MRI here or pay for another study.  Neurology proposes high-dose outpatient prednisone burst which patient is comfortable with, will follow with neurology in clinic  Patient will receive 1250 mg prednisone x5 days  Patient comfortable discharge plan.  Return precautions available.  No questions comments or concerns.    Procedures              Results for orders placed or performed during the hospital encounter of 06/12/23   Comprehensive metabolic panel     Status: Normal   Result Value Ref Range    Sodium 143 136 - 145 mmol/L    Potassium 3.9 3.4 - 5.3 mmol/L    Chloride 107 98 - 107 mmol/L    Carbon Dioxide (CO2) 23 22 - 29 mmol/L    Anion Gap 13 7 - 15 mmol/L    Urea Nitrogen 8.1 6.0 - 20.0 mg/dL    Creatinine 0.75 0.51 - 0.95 mg/dL    Calcium 9.6 8.6 - 10.0 mg/dL    Glucose 96 70 - 99 mg/dL    Alkaline Phosphatase 80 35 - 104 U/L    AST 18 10 - 35 U/L    ALT 11 10 - 35 U/L    Protein Total 7.8 6.4 - 8.3 g/dL    Albumin 4.6 3.5 - 5.2 g/dL    Bilirubin Total 0.6 <=1.2 mg/dL    GFR Estimate >90 >60 mL/min/1.73m2   CRP inflammation     Status: Normal   Result Value Ref Range    CRP Inflammation <3.00 <5.00 mg/L   Erythrocyte sedimentation rate auto     Status: Normal   Result Value Ref Range    Erythrocyte Sedimentation Rate 3 0 - 20 mm/hr   TSH with free T4 reflex     Status: Normal   Result Value Ref Range    TSH 0.78 0.30 - 4.20 uIU/mL   CBC with platelets and differential     Status: Abnormal   Result Value Ref Range    WBC Count 8.0 4.0 - 11.0 10e3/uL    RBC Count 4.98 3.80 - 5.20 10e6/uL    Hemoglobin 15.7 11.7 - 15.7 g/dL    Hematocrit 47.9 (H) 35.0 - 47.0 %    MCV 96 78 - 100 fL    MCH 31.5 26.5 - 33.0 pg    MCHC 32.8 31.5 - 36.5 g/dL    RDW 11.9 10.0 - 15.0 %    Platelet Count 238 150 - 450 10e3/uL    % Neutrophils 66 %    %  Lymphocytes 27 %    % Monocytes 5 %    % Eosinophils 1 %    % Basophils 1 %    % Immature Granulocytes 0 %    NRBCs per 100 WBC 0 <1 /100    Absolute Neutrophils 5.3 1.6 - 8.3 10e3/uL    Absolute Lymphocytes 2.2 0.8 - 5.3 10e3/uL    Absolute Monocytes 0.4 0.0 - 1.3 10e3/uL    Absolute Eosinophils 0.1 0.0 - 0.7 10e3/uL    Absolute Basophils 0.0 0.0 - 0.2 10e3/uL    Absolute Immature Granulocytes 0.0 <=0.4 10e3/uL    Absolute NRBCs 0.0 10e3/uL   CBC with platelets differential     Status: Abnormal    Narrative    The following orders were created for panel order CBC with platelets differential.  Procedure                               Abnormality         Status                     ---------                               -----------         ------                     CBC with platelets and d...[[id number]]  Abnormal            Final result                 Please view results for these tests on the individual orders.     Medications - No data to display  Labs Ordered and Resulted from Time of ED Arrival to Time of ED Departure   CBC WITH PLATELETS AND DIFFERENTIAL - Abnormal       Result Value    WBC Count 8.0      RBC Count 4.98      Hemoglobin 15.7      Hematocrit 47.9 (*)     MCV 96      MCH 31.5      MCHC 32.8      RDW 11.9      Platelet Count 238      % Neutrophils 66      % Lymphocytes 27      % Monocytes 5      % Eosinophils 1      % Basophils 1      % Immature Granulocytes 0      NRBCs per 100 WBC 0      Absolute Neutrophils 5.3      Absolute Lymphocytes 2.2      Absolute Monocytes 0.4      Absolute Eosinophils 0.1      Absolute Basophils 0.0      Absolute Immature Granulocytes 0.0      Absolute NRBCs 0.0     COMPREHENSIVE METABOLIC PANEL - Normal    Sodium 143      Potassium 3.9      Chloride 107      Carbon Dioxide (CO2) 23      Anion Gap 13      Urea Nitrogen 8.1      Creatinine 0.75      Calcium 9.6      Glucose 96      Alkaline Phosphatase 80      AST 18      ALT 11      Protein Total 7.8      Albumin 4.6       Bilirubin Total 0.6      GFR Estimate >90     CRP INFLAMMATION - Normal    CRP Inflammation <3.00     ERYTHROCYTE SEDIMENTATION RATE AUTO - Normal    Erythrocyte Sedimentation Rate 3     TSH WITH FREE T4 REFLEX - Normal    TSH 0.78       No orders to display          Critical care was not performed.     Medical Decision Making  The patient's presentation was of high complexity (an acute health issue posing potential threat to life or bodily function).    The patient's evaluation involved:  ordering and/or review of 3+ test(s) in this encounter (see separate area of note for details)  discussion of management or test interpretation with another health professional (Ophthalmology, neurology)    The patient's management necessitated high risk (a decision regarding hospitalization).      Assessment & Plan    Nickie Garcia is a 43 year old female with past medical history significant for MS, optic neuritis, Hashimoto's thyroiditis, hypothyroidism, HTN, kidney stone, vitamin D deficiency, anxiety who presents to the ED referred by neuro ophthalmology for blurred and double vision.  Patient was moderately hypertensive on arrival with other vital signs reassuring.  Her examination was notable for no focal neurologic symptoms beyond her vision issues.  She was referred specifically to see ophthalmology/neurology/neuro-ophthalmology.  Labs were obtained which did not show significant elevations in inflammatory markers.  No leukocytosis.  Outside MRI reviewed with new lesions that do not appear to be actively enhancing but these could explain her symptoms.    Patient was eval by ophthalmology who reports the presence of intranuclear ophthalmoplegia and concern for neurologic process.    Patient evaluated by neurology.  This appears to be an MS flare.  Strongly considered hospitalization and high-dose steroids, MRI with thin brainstem slices.  Patient comfortable with oral steroids and does not wish to pay for another  MRI scan at this time.  Patient will be discharged on 1250 mg of oral prednisone for 5 days.  Following closely neurology clinic.  She is aware if symptoms worsen she can return to the emergency department at any time.  No questions comments or concerns at time of discharge.      I have reviewed the nursing notes. I have reviewed the findings, diagnosis, plan and need for follow up with the patient.    Discharge Medication List as of 6/12/2023  7:29 PM      START taking these medications    Details   predniSONE (DELTASONE) 50 MG tablet Take 25 tablets (1,250 mg) by mouth daily for 5 days, Disp-125 tablet, R-0, E-PrescribeFor Acute MS Flare - Dose Confirmed with Neurology             Final diagnoses:   Multiple sclerosis exacerbation (H)     I, Leonor Gillis, am serving as a trained medical scribe to document services personally performed by Lucio Vidal MD based on the provider's statements to me on June 12, 2023.  This document has been checked and approved by the attending provider.    I, Lucio Vidal MD, was physically present and have reviewed and verified the accuracy of this note documented by Leonor Gillis, medical scribe.        Lucio Vidal Jr., MD   [facility] EMERGENCY DEPARTMENT  6/12/2023     Lucio Vidal MD  06/12/23 [id number]

## 2023-06-12 NOTE — CONSULTS
[facility]  Neurology Consultation    Patient Name:  Nickie Garcia  MRN:  [id number]    :  1980  Date of Service:  2023  Primary care provider:  Bonita Mullins      Neurology consultation service was asked to see Nickie Garcia by Dr. Palomares to evaluate hx of MS, blurry vision.    Chief Complaint:  Vision changes    History of Present Illness:   Nickie Garcia is a 43 year old female with history of MS not on disease modifying therapy, HTN, hypothyroid who presents with changes in her vision x6 days.    Patient reports that on Wednesday last week, 6 days ago, she noticed that she had slow progression of blurry vision.  She thinks that the blurry vision is affecting both eyes, and is described as objects appearing blurry at times, as well as horizontal diplopia.  She does report that the blurring and diplopia improves if she closes 1 eye or the other. It also improve when she cries or has more lubrication in her eye.  It is made worse with turning her head or looking to the sides.  She does not think she had any vision loss, or visual field cuts.  Think she can see color vision okay.  There is no eye pain or headache.  She has not had any similar episodes to this in the past.  Does report multiple sclerosis was diagnosed after an episode of optic neuritis, but she is unable to tell me which eye was affected at that time.  No dizziness or vertigo.  No sensory or motor changes on face, arms, or legs.     She said that she did go to the emergency department at [facility] on 6/10 for symptoms.  She had an MRI of her brain with and without contrast completed that did not show any enhancement of either optic nerve, or new enhancing lesions in the brain.  There were interval nonenhancing lesions in the brain consistent with MS diagnosis.  On-call neurologist was contacted at that time who recommended against steroids due to no  enhancing lesions.  Patient was discharged home with meclizine and asked to follow-up with neurology outpatient.    Patient reports she subsequently followed up an eye clinic in [place] today, however did not have any testing or an eye exam completed as they directed her to the emergency department for further evaluation.    ROS  A comprehensive ROS was performed and pertinent findings were included in HPI.     PMH  Past Medical History:   Diagnosis Date    Anxiety disorder 2012    Class 1 obesity due to excess calories with serious comorbidity and body mass index (BMI) of 30.0 to 30.9 in adult 2019    Discogenic low back pain 2019    Essential hypertension 09/05/2019    borderline    History of abnormal cervical Pap smear 2017 4/13/18:  Pap NIL/HPV neg 2/2017: Pap LSIL HPV + (not 16/18)    Hypothyroid 2002    Kidney stone 2019    Multiple sclerosis (H) 2018    MS, relapsing, remitting:  Neurology.  Presented with optic neuritis.      Past Surgical History:   Procedure Laterality Date    HC COLP CERVIX/UPPER VAGINA  2017    HC DILATION/CURETTAGE DIAG/THER NON OB  2017    benign polyp    HC INSERTION INTRAUTERINE DEVICE  2012    HC REMOVE INTRAUTERINE DEVICE  2012    HC TOOTH EXTRACTION W/FORCEP      LAPAROSCOPIC SALPINGECTOMY Bilateral 7/21/2022    Procedure: SALPINGECTOMY, LAPAROSCOPIC bilateral Hysteroscopic intrauterine device removal;  Surgeon: Theresa Key DO;  Location: [facility] OR    PE TUBES      TONSILLECTOMY & ADENOIDECTOMY         Medications   I have personally reviewed the patient's medication list.     Allergies  I have personally reviewed the patient's allergy list.     Social History  Social History     Socioeconomic History    Marital status:      Spouse name: José Antonio    Number of children: 2    Years of education: None    Highest education level: None   Occupational History    Occupation: Infection Prevention RN     Employer: [employer]     Comment: [employer]  "  Tobacco Use    Smoking status: Every Day     Packs/day: 0.50     Types: Cigarettes    Smokeless tobacco: Never   Vaping Use    Vaping status: Never Used   Substance and Sexual Activity    Alcohol use: No    Drug use: No    Sexual activity: Yes     Partners: Male     Birth control/protection: I.U.D.   Social History Narrative         Family History    Family History   Problem Relation Age of Onset    Hypertension Father     Cerebrovascular Disease Father 54        hemorrhagic stroke d/t rupture of brain aneurysm    Aneurysm Father     Neurologic Disorder Mother         ms    No Known Problems Sister     Cancer Maternal Grandmother         brain    Multiple Sclerosis Maternal Grandfather     Heart Disease Paternal Grandmother         chf    Diabetes Paternal Grandfather     Diabetes Other     Cancer Paternal Uncle         cancer in eye and liver        Physical Examination   Vitals: BP (!) 156/117   Pulse 89   Temp 98.2  F (36.8  C)   Resp 18   Ht 1.753 m (5' 9\")   Wt 86.2 kg (190 lb)   LMP  (LMP Unknown)   SpO2 98%   BMI 28.06 kg/m    General: sitting up in chair, no acute distress  Head: normocephalic, non tender to palpation of temples  Eyes: no icterus  Cardiac: regular rate per vitals  Respiratory: non-labored on RA  GI: non-tender  Skin: No rash or lesion on exposed skin  Psych: Mood pleasant, intermittently tearful  Neuro:  Mental status: Awake, alert, attentive, oriented to self, time, place, and circumstance. Language is fluent and coherent with intact comprehension of complex commands, naming and repetition.  Cranial nerves: VFF to finger counting, PERRL, conjugate gaze, subtle bilateral ELIU, unable to fully abduct L eye,  slight protrusion of left eye compared to right, facial sensation intact, face symmetric, shoulder shrug strong, tongue/uvula midline, no dysarthria.   Motor: Normal bulk and tone. No abnormal movements. 5/5 strength bilaterally in deltoids, biceps, triceps, hand , hip " flexors, hip extensors, knee flexion, knee extension, plantarflexion, dorsiflexion.   Reflexes: Normo-reflexic and symmetric biceps, brachioradialis, triceps, patellae, and achilles.   Sensory: Intact to light touch in proximal and distal aspects of all 4 extremities   Coordination: FNF without ataxia or dysmetria.    Gait: Normal width, stride length, turn, and arm swing. Station normal.     Investigations   I have personally reviewed pertinent labs, tests, and radiological imaging. Discussion of notable findings is included under Impression.     Was patient transferred from outside hospital?   No    Impression  #Bilateral ELIU  #MS flare  #History of MS, not on disease modifying therapy  Nickie Garcia is a 43 year old female with history of MS not on disease modifying therapy, HTN, hypothyroid who presents with changes in her vision x6 days.    Neurologic exam with subtle bilateral ELIU, as well as difficulty with abduction of the left eye. Given this, there is concern for MS flare particularly with a lesion in the brainstem leading to these exam changes. She did have an MRI brain with and without contrast at [facility] on 6/10 that did not show an obvious lesion in the brainstem, however these were not with thin cuts. Discussed with patient that we would like to repeat an MRI brain with and without contrast with thin cuts through the brainstem however she kindly declined and would like to go home. In that case will empirically treat as MS flare with high dose Prednisone PO. Discussed with patient that she will be prescribed 5 days of steroids, however if she has improvement after 3 days she can stop.      Recommendations  - Pleas prescribe prednisone 1,250 mg daily PO x 5 days  - PPI for GI prophylaxis while on steroid  - Patient declined MRI brain in the ED  - Patient declined admission  -Recommended over-the-counter moisturizing eyedrops such as carboxymethylcellulose  - Patient is going to call  Neurology at [facility] for follow up with Dr. Cronin    Thank you for involving Neurology in the care of Nickie Garcia.  Please do not hesitate to call with questions/concerns (consult pager [phone]).      Patient was seen and discussed with Dr. Naranjo.    Freda Oden MD  Neurology Resident PGY3

## 2023-06-12 NOTE — ED TRIAGE NOTES
Pt with diagnosis of MS presents with a week long history of blurry vision and double vision.  Had MRI that showed no new lesions.  Was seen by opthamology today and referred to [place] for evaluation by neuro opth.     Triage Assessment     Row Name 06/12/23 3611       Triage Assessment (Adult)    Airway WDL WDL       Respiratory WDL    Respiratory WDL WDL       Skin Circulation/Temperature WDL    Skin Circulation/Temperature WDL WDL       Cardiac WDL    Cardiac WDL WDL       Peripheral/Neurovascular WDL    Peripheral Neurovascular WDL WDL       Cognitive/Neuro/Behavioral WDL    Cognitive/Neuro/Behavioral WDL WDL

## 2023-06-12 NOTE — CONSULTS
OPHTHALMOLOGY CONSULT NOTE  06/12/23    Patient: Nickie Garcia      ASSESSMENT/PLAN:     Nickie Garcia is a 43 year old female who presented with     # New onset blurry vision and double vision  # History of MS   # Bilateral Internuclear Ophthalmoplegia   - 5 days of non-specific blurry vision and horizontal double vision. No pain with eye movements.   - VA 20/20 each eye   - Color plates 11/11 each eye   - PERRL - no APD appreciated in either eye  - Bilateral adducting deficit with abducting nystagmus in fellow eye, most consistent with bilateral ELIU  - Risk factors: Hx of MS, age, gender   - MRI: unable to produce images from outside hospital but per discussion with neurology, there were no new lesions appreciated in the brain stem. However, there were no thin cuts of the brain stem taken.  -  Differential diagnosis  No history suggestive of MG. No anisocoria or ptosis to suggest CN 3 palsy.No evidence of mass lesion or stroke on MRI.   - This most likely represents a flare of her MS given her history and risk factors. Will recommend treatment with steroids.     Plan:   - IVMP 1g/day or Prednisone 1250 mg daily for 3-5 days   - This can be determined at neurology's discretion.   - PPI for GI prophylaxis  - Follow up in neuro-ophthalmology clinic in 6-8 weeks      # Dry eye each eye  # Long-term contact use each eye   - Patient has mild PEE in both eyes, and evidence of long term contact lens use  - Recommend the patient purchase a pair of glasses    Plan:  - Artificial tears QID prn     It is our pleasure to participate in this patient's care and treatment. Please contact us with any further questions or concerns.    Discussed with Dr. Coyne who agreed with this assessment and plan.     Effie Murguia MD     HISTORY OF PRESENTING ILLNESS:     Nickie Garcia is a 43 year old female who presented on 6/12/23 with complaints of bilateral blurry vision and horizontal binocular double vision.      Detailed history per chart review:     Patient reports that on Wednesday last week, 6 days ago, she noticed that she had slow progression of blurry vision.  She thinks that the blurry vision is affecting both eyes, and is described as objects appearing blurry at times, as well as horizontal diplopia.  She does report that the blurring and diplopia improves if she closes 1 eye or the other. It also improve when she cries or has more lubrication in her eye.  It is made worse with turning her head or looking to the sides.  She does not think she had any vision loss, or visual field cuts.  Think she can see color vision okay.  There is no eye pain or headache.  She has not had any similar episodes to this in the past.  Does report multiple sclerosis was diagnosed after an episode of optic neuritis, but she is unable to tell me which eye was affected at that time.  No dizziness or vertigo.  No sensory or motor changes on face, arms, or legs.      She said that she did go to the emergency department at [facility] on 6/10 for symptoms.  She had an MRI of her brain with and without contrast completed that did not show any enhancement of either optic nerve, or new enhancing lesions in the brain.  There were interval nonenhancing lesions in the brain consistent with MS diagnosis.  On-call neurologist was contacted at that time who recommended against steroids due to no enhancing lesions.  Patient was discharged home with meclizine and asked to follow-up with neurology outpatient.     Patient reports she subsequently followed up an eye clinic in [place] today, however did not have any testing or an eye exam completed as they directed her to the emergency department for further evaluation.      10+ review of systems were otherwise negative except for that which has been stated above.      OCULAR/MEDICAL/SURGICAL HISTORIES:     Past Ocular History:   Contacts for myopia with astigmatism     Eye Drops:    None    Pertinent Systemic Medications:   Steroids during MS flares but none at this time    Past Medical History:  Past Medical History:   Diagnosis Date    Anxiety disorder 2012    Class 1 obesity due to excess calories with serious comorbidity and body mass index (BMI) of 30.0 to 30.9 in adult 2019    Discogenic low back pain 2019    Essential hypertension 09/05/2019    borderline    History of abnormal cervical Pap smear 2017 4/13/18:  Pap NIL/HPV neg 2/2017: Pap LSIL HPV + (not 16/18)    Hypothyroid 2002    Kidney stone 2019    Multiple sclerosis (H) 2018    MS, relapsing, remitting:  Neurology.  Presented with optic neuritis.        Past Surgical History:   Past Surgical History:   Procedure Laterality Date    HC COLP CERVIX/UPPER VAGINA  2017    HC DILATION/CURETTAGE DIAG/THER NON OB  2017    benign polyp    HC INSERTION INTRAUTERINE DEVICE  2012    HC REMOVE INTRAUTERINE DEVICE  2012    HC TOOTH EXTRACTION W/FORCEP      LAPAROSCOPIC SALPINGECTOMY Bilateral 7/21/2022    Procedure: SALPINGECTOMY, LAPAROSCOPIC bilateral Hysteroscopic intrauterine device removal;  Surgeon: Theresa Key DO;  Location: [facility] OR    PE TUBES      TONSILLECTOMY & ADENOIDECTOMY         Family History:  Family History   Problem Relation Age of Onset    Hypertension Father     Cerebrovascular Disease Father 54        hemorrhagic stroke d/t rupture of brain aneurysm    Aneurysm Father     Neurologic Disorder Mother         ms    No Known Problems Sister     Cancer Maternal Grandmother         brain    Multiple Sclerosis Maternal Grandfather     Heart Disease Paternal Grandmother         chf    Diabetes Paternal Grandfather     Diabetes Other     Cancer Paternal Uncle         cancer in eye and liver          Social History:  Social History     Socioeconomic History    Marital status:      Spouse name: José Antonio    Number of children: 2    Years of education: Not on file    Highest education level: Not on file   Occupational  History    Occupation: Infection Prevention RN     Employer: [employer]     Comment: [employer] [place]   Tobacco Use    Smoking status: Every Day     Packs/day: 0.50     Types: Cigarettes    Smokeless tobacco: Never   Vaping Use    Vaping status: Never Used   Substance and Sexual Activity    Alcohol use: No    Drug use: No    Sexual activity: Yes     Partners: Male     Birth control/protection: I.U.D.   Other Topics Concern    Parent/sibling w/ CABG, MI or angioplasty before 65F 55M? Not Asked   Social History Narrative         Social Determinants of Health     Financial Resource Strain: Not on file   Food Insecurity: Not on file   Transportation Needs: Not on file   Physical Activity: Not on file   Stress: Not on file   Social Connections: Not on file   Intimate Partner Violence: Not on file   Housing Stability: Not on file         EXAMINATION:     Base Eye Exam       Visual Acuity (Snellen - Linear)         Right Left    Near cc 20/20 20/20              Tonometry (Tonopen, 5:26 PM)         Right Left    Pressure 15 16              Pupils         Pupils    Right PERRL    Left PERRL              Visual Fields         Left Right     Full Full              Extraocular Movement         Right Left     -- -1 --   0  -3   -- 0 --    -- -1 --   -2  -1   -- 0 --      Left eye end-gaze nystagmus in left gaze  Right eye end-gaze nystagmus in right gaze  Right eyelid twitch with left and right end-gaze               Neuro/Psych       Oriented x3: Yes    Mood/Affect: Normal              Dilation       Both eyes: 1.0% Mydriacyl, 2.5% Aneudy Synephrine @ 5:26 PM                  Additional Tests       Color         Right Left    Ishihara 11/11 11/11                  Slit Lamp and Fundus Exam       External Exam         Right Left    External Normal Normal              Slit Lamp Exam         Right Left    Lids/Lashes Normal ?lagophthalmos    Conjunctiva/Sclera White and quiet White and quiet    Cornea mild PEE Mild PEE     Anterior Chamber Deep and quiet Deep and quiet    Iris Round and reactive, dilated Round and reactive, dilated    Lens Clear Clear    Anterior Vitreous Normal Normal              Fundus Exam         Right Left    Disc Normal Normal    C/D Ratio 0.15 0.15    Macula Normal Normal    Vessels Normal Normal    Periphery Normal Normal                    Labs/Studies/Imaging Performed  MRI from outside hospital: images pending       Effie Murguia M.D.  PGY-2 Resident Physician   Department of Ophthalmology  06/12/23 4:59 PM   Pager: [phone]

## 2023-06-13 ENCOUNTER — TELEPHONE (OUTPATIENT)
Dept: OPHTHALMOLOGY | Facility: CLINIC | Age: 43
End: 2023-06-13
Payer: COMMERCIAL

## 2023-06-13 NOTE — TELEPHONE ENCOUNTER
Spoke to patient and assisted with scheduling neuro-op appointment in 6-8 weeks.     Val Hodgson on 6/13/2023 at 2:29 PM

## 2023-06-14 NOTE — TELEPHONE ENCOUNTER
FUTURE VISIT INFORMATION      FUTURE VISIT INFORMATION:    Date: 7/26/23    Time: 9:30am    Location: [facility]  REFERRAL INFORMATION:    Reason for visit/diagnosis  bilateral ELIU in the setting of MS, post hospital follow up    RECORDS REQUESTED FROM:       Clinic name Comments Records Status Imaging Status   ealth ED ED visit 6/12/23, ED visit 6/10/23 EPIC    Imaging MR Brain 6/10/23  MR Brain 11/5/21 [facility] PAC

## 2023-06-15 NOTE — TELEPHONE ENCOUNTER
RECORDS RECEIVED FROM: internal   REASON FOR VISIT: New patient   Date of Appt: 6/27/23   NOTES (FOR ALL VISITS) STATUS DETAILS   OFFICE NOTE from referring provider Internal SEE INPATIENT NOTES   OFFICE NOTE from other specialist Internal/CE Dr Mane Snowden @ [facility]:  1/3/23    Dr Liz Franco @ [facility] Neurology:  11/4/21  10/17/19    Dr Valery Cronin @  Neurology:  3/12/18    Dr Evelyn Brown @  Neurology:  1/31/18   DISCHARGE REPORT from the ER Internal [facility]:  6/12/23    [facility]:  6/10/23  7/26/18   MEDICATION LIST Internal    IMAGING  (FOR ALL VISITS)     LUMBAR PUNCTURE Care Everywhere [facility]:  2/23/18   MRI (HEAD, NECK, SPINE) PACS [facility]:  MRI Brain 6/10/23    [facility]:  MRI Brain 11/5/21  MRI Cervical Spine 11/5/21  MRA Brain COW 10/29/19  MRI Thoracic Spine 10/29/19  MRI Cervical Spine 10/29/19  MRI Brain 10/29/19

## 2023-06-23 ENCOUNTER — TELEPHONE (OUTPATIENT)
Dept: OPTOMETRY | Facility: CLINIC | Age: 43
End: 2023-06-23
Payer: COMMERCIAL

## 2023-06-23 NOTE — TELEPHONE ENCOUNTER
[facility] Call Center    Phone Message    May a detailed message be left on voicemail: yes     Reason for Call: Other: pt is requesting a call back to discuss symptoms please contact pt to discuss Thank you!!     Action Taken: Message routed to:  [facility] eye    Travel Screening: Not Applicable

## 2023-06-23 NOTE — TELEPHONE ENCOUNTER
Called patient to discuss double vision symptoms. Patient was seen at ER about 2 weeks ago. She states that her double vision is not as bad as before. She is scheduled for an eye exam next week, she was inquiring about an earlier appt. No appts avail when I checked for eye exams. Pt ok with Tuesday appt.     Kaia Prather on 6/23/2023 at 9:40 AM

## 2023-06-27 ENCOUNTER — ANCILLARY PROCEDURE (OUTPATIENT)
Dept: MRI IMAGING | Facility: CLINIC | Age: 43
End: 2023-06-27
Attending: PSYCHIATRY & NEUROLOGY
Payer: COMMERCIAL

## 2023-06-27 ENCOUNTER — LAB (OUTPATIENT)
Dept: LAB | Facility: CLINIC | Age: 43
End: 2023-06-27
Payer: COMMERCIAL

## 2023-06-27 ENCOUNTER — PRE VISIT (OUTPATIENT)
Dept: NEUROLOGY | Facility: CLINIC | Age: 43
End: 2023-06-27
Payer: COMMERCIAL

## 2023-06-27 ENCOUNTER — OFFICE VISIT (OUTPATIENT)
Dept: NEUROLOGY | Facility: CLINIC | Age: 43
End: 2023-06-27
Attending: PSYCHIATRY & NEUROLOGY
Payer: COMMERCIAL

## 2023-06-27 VITALS
DIASTOLIC BLOOD PRESSURE: 91 MMHG | SYSTOLIC BLOOD PRESSURE: 134 MMHG | HEART RATE: 71 BPM | BODY MASS INDEX: 27.23 KG/M2 | OXYGEN SATURATION: 98 % | WEIGHT: 190.2 LBS | HEIGHT: 70 IN

## 2023-06-27 DIAGNOSIS — G35 MS (MULTIPLE SCLEROSIS) (H): Primary | ICD-10-CM

## 2023-06-27 DIAGNOSIS — G35 MS (MULTIPLE SCLEROSIS) (H): ICD-10-CM

## 2023-06-27 DIAGNOSIS — H53.2 DIPLOPIA: ICD-10-CM

## 2023-06-27 DIAGNOSIS — Z51.81 THERAPEUTIC DRUG MONITORING: ICD-10-CM

## 2023-06-27 DIAGNOSIS — E55.9 VITAMIN D DEFICIENCY: ICD-10-CM

## 2023-06-27 LAB
BASOPHILS # BLD AUTO: 0 10E3/UL (ref 0–0.2)
BASOPHILS NFR BLD AUTO: 0 %
EOSINOPHIL # BLD AUTO: 0.1 10E3/UL (ref 0–0.7)
EOSINOPHIL NFR BLD AUTO: 1 %
ERYTHROCYTE [DISTWIDTH] IN BLOOD BY AUTOMATED COUNT: 12.2 % (ref 10–15)
HCT VFR BLD AUTO: 44.3 % (ref 35–47)
HGB BLD-MCNC: 14.9 G/DL (ref 11.7–15.7)
IMM GRANULOCYTES # BLD: 0 10E3/UL
IMM GRANULOCYTES NFR BLD: 0 %
LYMPHOCYTES # BLD AUTO: 2.3 10E3/UL (ref 0.8–5.3)
LYMPHOCYTES NFR BLD AUTO: 22 %
MCH RBC QN AUTO: 32.2 PG (ref 26.5–33)
MCHC RBC AUTO-ENTMCNC: 33.6 G/DL (ref 31.5–36.5)
MCV RBC AUTO: 96 FL (ref 78–100)
MONOCYTES # BLD AUTO: 0.5 10E3/UL (ref 0–1.3)
MONOCYTES NFR BLD AUTO: 5 %
NEUTROPHILS # BLD AUTO: 7.4 10E3/UL (ref 1.6–8.3)
NEUTROPHILS NFR BLD AUTO: 72 %
NRBC # BLD AUTO: 0 10E3/UL
NRBC BLD AUTO-RTO: 0 /100
PLATELET # BLD AUTO: 180 10E3/UL (ref 150–450)
RBC # BLD AUTO: 4.63 10E6/UL (ref 3.8–5.2)
WBC # BLD AUTO: 10.3 10E3/UL (ref 4–11)

## 2023-06-27 PROCEDURE — A9585 GADOBUTROL INJECTION: HCPCS | Mod: JZ | Performed by: RADIOLOGY

## 2023-06-27 PROCEDURE — 85025 COMPLETE CBC W/AUTO DIFF WBC: CPT

## 2023-06-27 PROCEDURE — G0463 HOSPITAL OUTPT CLINIC VISIT: HCPCS | Performed by: PSYCHIATRY & NEUROLOGY

## 2023-06-27 PROCEDURE — 84439 ASSAY OF FREE THYROXINE: CPT

## 2023-06-27 PROCEDURE — 86787 VARICELLA-ZOSTER ANTIBODY: CPT

## 2023-06-27 PROCEDURE — 84445 ASSAY OF TSI GLOBULIN: CPT | Mod: 90

## 2023-06-27 PROCEDURE — 86376 MICROSOMAL ANTIBODY EACH: CPT

## 2023-06-27 PROCEDURE — 83520 IMMUNOASSAY QUANT NOS NONAB: CPT | Mod: 90

## 2023-06-27 PROCEDURE — 70543 MRI ORBT/FAC/NCK W/O &W/DYE: CPT | Mod: GC | Performed by: RADIOLOGY

## 2023-06-27 PROCEDURE — 84480 ASSAY TRIIODOTHYRONINE (T3): CPT

## 2023-06-27 PROCEDURE — 86706 HEP B SURFACE ANTIBODY: CPT

## 2023-06-27 PROCEDURE — 82306 VITAMIN D 25 HYDROXY: CPT

## 2023-06-27 PROCEDURE — 99215 OFFICE O/P EST HI 40 MIN: CPT | Performed by: PSYCHIATRY & NEUROLOGY

## 2023-06-27 PROCEDURE — 36415 COLL VENOUS BLD VENIPUNCTURE: CPT

## 2023-06-27 PROCEDURE — 87340 HEPATITIS B SURFACE AG IA: CPT

## 2023-06-27 PROCEDURE — 99000 SPECIMEN HANDLING OFFICE-LAB: CPT

## 2023-06-27 PROCEDURE — 86704 HEP B CORE ANTIBODY TOTAL: CPT

## 2023-06-27 PROCEDURE — 82784 ASSAY IGA/IGD/IGG/IGM EACH: CPT

## 2023-06-27 RX ORDER — GADOBUTROL 604.72 MG/ML
10 INJECTION INTRAVENOUS ONCE
Status: COMPLETED | OUTPATIENT
Start: 2023-06-27 | End: 2023-06-27

## 2023-06-27 RX ADMIN — GADOBUTROL 8.5 ML: 604.72 INJECTION INTRAVENOUS at 14:09

## 2023-06-27 ASSESSMENT — PAIN SCALES - GENERAL: PAINLEVEL: NO PAIN (1)

## 2023-06-27 NOTE — PATIENT INSTRUCTIONS
Blood work today     Prioritize MRI orbit -- after this is done I would recommend iv steroids  You also need a spinal cord image    Follow up after MRI is done (virtual is okay)

## 2023-06-27 NOTE — LETTER
6/27/2023       RE: Nickie Garcia  [address]  [facility] [address]       Dear Colleague,    Thank you for referring your patient, Nickie Garcia, to the [facility] [place] at [facility]. Please see a copy of my visit note below.    Date of Service: 6/27/2023    [facility] Neurology   MS Clinic Evaluation    Subjective: 43-year-old woman with a history of hypothyroidism who presents for evaluation of multiple sclerosis.    Disease onset at age 38 when she had an episode of left optic neuritis.  Patient did recover.    In October 2021 when she had an episode of thoracic sensory myelitis affecting sensation from the knees down.  Symptoms resolved over several weeks.    She has been off of disease modifying therapy.    In early June she experienced rather acute onset of sensitivity to light from both eyes.  She had difficulty focusing in particular when she moved her eyes.  He developed blurry vision from both eyes.  She presented to the ER where an MRI was performed and was remarkable for new lesions and she was given high-dose steroids.  She took prednisone 1250 mg daily for 5 days.  She is now approximately 1 week from taking the steroids and continues to have significant photophobia, dry eyes, and double vision when looking at a distance or when looking to the left.    She has questions about whether or not she may have thyroid eye disease.    Disease onset: age 38, L ON    Prior DMDs:   glatiramer 4/2018-5/2018, large red injection site reactions   Copaxone 5/2018 - 11/2019, elective, family planning    Vitamin D: 5000 IU daily      Allergies   Allergen Reactions    Wellbutrin [Bupropion] Other (See Comments)     Optic neuritis       Current Outpatient Medications   Medication    amLODIPine (NORVASC) 5 MG tablet    ibuprofen (ADVIL/MOTRIN) 600 MG tablet    levothyroxine (SYNTHROID) 200 MCG tablet    Vitamin D3  "(CHOLECALCIFEROL) 125 MCG (5000 UT) tablet    ibuprofen (ADVIL/MOTRIN) 800 MG tablet    oxyCODONE (ROXICODONE) 5 MG tablet    varenicline (CHANTIX BEHZAD) 0.5 MG X 11 & 1 MG X 42 tablet     No current facility-administered medications for this visit.        Past medical, surgical, social and family history was personally reviewed. Pertinent details noted above.     Physical Examination:   BP (!) 134/91 (BP Location: Left arm, Patient Position: Sitting, Cuff Size: Adult Regular)   Pulse 71   Ht 1.773 m (5' 9.8\")   Wt 86.3 kg (190 lb 3.2 oz)   LMP  (LMP Unknown)   SpO2 98%   BMI 27.45 kg/m      General: no acute distress  Cranial nerves:   VFFC  PERRL w/no RAPD  No ELIU + L CN 6  Face symmetric  Hearing intact  No dysarthria   Motor:   Tone is normal   Bulk is normal     R L  Deltoid  5 5  Biceps  5 5  Triceps 5 5  Wrist ext 5 5  Finger ext 5 5  Finger abd 5 5    Hip flexion 5 5  Knee flexion 5 5  Knee ext 5 5  Ankle d/f 5 5    Reflexes: 2+ and symmetric throughout, babinski absent bilaterally  Sensory: vibration is normal in the toes, JPS normal in the toes   Romberg is absent  Coordination: no ataxia or dysmetria  Gait: normal base and stride, tandem gait is intact, able to balance on left foot and hop x 5, diff balancing on right foot    Tests/Imaging:   Vitamin D 16  JCV Ab negative?        MRI Brain  6/2023 - overall low lesion burden, no definite lesions in the brainstem, gd-    MRI Cervical spine   11/2021 - dorsal cord lesion t2    MRI Thoracic spine   10/2019 - no definite lesions     Assessment: 43-year-old woman with relapsing remitting multiple sclerosis who has been off of disease modifying therapy and has experienced relatively acute onset of photophobia in the absence of eye pain, and diplopia.  Symptoms is consistent with a brainstem relapse.  We discussed how sometimes findings on MRI are delayed compared to clinical presentation.    She had concerns about thyroid eye disease.  I explained that " given that relatively acute onset of her symptoms I think that thyroid eye disease is less likely.  However we will assess for thyroid dysfunction.    I recommended that she undergo updated imaging.  I will include MRI of the orbits to ensure no evidence of extraocular muscle involvement.    Blood work for baseline before starting treatment was advised.    If no evidence of thyroid eye disease on MRI then I would recommend a course of IV methylprednisolone.    Plan:   -MRI orbit, MRI cervical and thoracic spine  - Blood work today  - Follow-up to discuss results    Note was completed with the assistance of Dragon Fluency software which can often result in accidental word substitutions.     A total of 50 minutes on the date of service were spent in the care of this patient.   Valery Cronin MD on 6/27/2023 at 11:48 AM          Again, thank you for allowing me to participate in the care of your patient.      Sincerely,    Valery Cronin MD

## 2023-06-27 NOTE — PROGRESS NOTES
Date of Service: 6/27/2023    [facility] Neurology   MS Clinic Evaluation    Subjective: 43-year-old woman with a history of hypothyroidism who presents for evaluation of multiple sclerosis.    Disease onset at age 38 when she had an episode of left optic neuritis.  Patient did recover.    In October 2021 when she had an episode of thoracic sensory myelitis affecting sensation from the knees down.  Symptoms resolved over several weeks.    She has been off of disease modifying therapy.    In early June she experienced rather acute onset of sensitivity to light from both eyes.  She had difficulty focusing in particular when she moved her eyes.  He developed blurry vision from both eyes.  She presented to the ER where an MRI was performed and was remarkable for new lesions and she was given high-dose steroids.  She took prednisone 1250 mg daily for 5 days.  She is now approximately 1 week from taking the steroids and continues to have significant photophobia, dry eyes, and double vision when looking at a distance or when looking to the left.    She has questions about whether or not she may have thyroid eye disease.    Disease onset: age 38, L ON    Prior DMDs:   glatiramer 4/2018-5/2018, large red injection site reactions   Copaxone 5/2018 - 11/2019, elective, family planning    Vitamin D: 5000 IU daily      Allergies   Allergen Reactions     Wellbutrin [Bupropion] Other (See Comments)     Optic neuritis       Current Outpatient Medications   Medication     amLODIPine (NORVASC) 5 MG tablet     ibuprofen (ADVIL/MOTRIN) 600 MG tablet     levothyroxine (SYNTHROID) 200 MCG tablet     Vitamin D3 (CHOLECALCIFEROL) 125 MCG (5000 UT) tablet     ibuprofen (ADVIL/MOTRIN) 800 MG tablet     oxyCODONE (ROXICODONE) 5 MG tablet     varenicline (CHANTIX BEHZAD) 0.5 MG X 11 & 1 MG X 42 tablet     No current facility-administered medications for this visit.        Past medical, surgical, social and family history was personally reviewed.  "Pertinent details noted above.     Physical Examination:   BP (!) 134/91 (BP Location: Left arm, Patient Position: Sitting, Cuff Size: Adult Regular)   Pulse 71   Ht 1.773 m (5' 9.8\")   Wt 86.3 kg (190 lb 3.2 oz)   LMP  (LMP Unknown)   SpO2 98%   BMI 27.45 kg/m      General: no acute distress  Cranial nerves:   VFFC  PERRL w/no RAPD  No ELIU + L CN 6  Face symmetric  Hearing intact  No dysarthria   Motor:   Tone is normal   Bulk is normal     R L  Deltoid  5 5  Biceps  5 5  Triceps 5 5  Wrist ext 5 5  Finger ext 5 5  Finger abd 5 5    Hip flexion 5 5  Knee flexion 5 5  Knee ext 5 5  Ankle d/f 5 5    Reflexes: 2+ and symmetric throughout, babinski absent bilaterally  Sensory: vibration is normal in the toes, JPS normal in the toes   Romberg is absent  Coordination: no ataxia or dysmetria  Gait: normal base and stride, tandem gait is intact, able to balance on left foot and hop x 5, diff balancing on right foot    Tests/Imaging:   Vitamin D 16  JCV Ab negative?        MRI Brain  6/2023 - overall low lesion burden, no definite lesions in the brainstem, gd-    MRI Cervical spine   11/2021 - dorsal cord lesion t2    MRI Thoracic spine   10/2019 - no definite lesions     Assessment: 43-year-old woman with relapsing remitting multiple sclerosis who has been off of disease modifying therapy and has experienced relatively acute onset of photophobia in the absence of eye pain, and diplopia.  Symptoms is consistent with a brainstem relapse.  We discussed how sometimes findings on MRI are delayed compared to clinical presentation.    She had concerns about thyroid eye disease.  I explained that given that relatively acute onset of her symptoms I think that thyroid eye disease is less likely.  However we will assess for thyroid dysfunction.    I recommended that she undergo updated imaging.  I will include MRI of the orbits to ensure no evidence of extraocular muscle involvement.    Blood work for baseline before starting " treatment was advised.    If no evidence of thyroid eye disease on MRI then I would recommend a course of IV methylprednisolone.    Plan:   -MRI orbit, MRI cervical and thoracic spine  - Blood work today  - Follow-up to discuss results    Note was completed with the assistance of Dragon Fluency software which can often result in accidental word substitutions.     A total of 50 minutes on the date of service were spent in the care of this patient.   Valery Cronin MD on 6/27/2023 at 11:48 AM

## 2023-06-28 LAB
DEPRECATED CALCIDIOL+CALCIFEROL SERPL-MC: 34 UG/L (ref 20–75)
HBV CORE AB SERPL QL IA: NONREACTIVE
HBV SURFACE AB SERPL IA-ACNC: 330.49 M[IU]/ML
HBV SURFACE AB SERPL IA-ACNC: REACTIVE M[IU]/ML
HBV SURFACE AG SERPL QL IA: NONREACTIVE
IGA SERPL-MCNC: 147 MG/DL (ref 84–499)
IGG SERPL-MCNC: 1012 MG/DL (ref 610–1616)
IGM SERPL-MCNC: 72 MG/DL (ref 35–242)
T3 SERPL-MCNC: 62 NG/DL (ref 85–202)
T4 FREE SERPL-MCNC: 0.85 NG/DL (ref 0.9–1.7)
THYROPEROXIDASE AB SERPL-ACNC: 117 IU/ML
TSH RECEP AB SER-ACNC: <1.1 IU/L (ref 0–1.75)
VZV IGG SER QL IA: 3211 INDEX
VZV IGG SER QL IA: POSITIVE

## 2023-06-29 ENCOUNTER — INFUSION THERAPY VISIT (OUTPATIENT)
Dept: INFUSION THERAPY | Facility: CLINIC | Age: 43
End: 2023-06-29
Attending: PSYCHIATRY & NEUROLOGY
Payer: COMMERCIAL

## 2023-06-29 VITALS
OXYGEN SATURATION: 100 % | HEART RATE: 74 BPM | TEMPERATURE: 97.9 F | SYSTOLIC BLOOD PRESSURE: 121 MMHG | RESPIRATION RATE: 16 BRPM | DIASTOLIC BLOOD PRESSURE: 78 MMHG

## 2023-06-29 DIAGNOSIS — G35 MULTIPLE SCLEROSIS (H): Primary | ICD-10-CM

## 2023-06-29 LAB — TSI SER-ACNC: <1 TSI INDEX

## 2023-06-29 PROCEDURE — 250N000011 HC RX IP 250 OP 636: Mod: JZ | Performed by: PSYCHIATRY & NEUROLOGY

## 2023-06-29 PROCEDURE — 258N000003 HC RX IP 258 OP 636: Performed by: PSYCHIATRY & NEUROLOGY

## 2023-06-29 PROCEDURE — 96365 THER/PROPH/DIAG IV INF INIT: CPT

## 2023-06-29 RX ORDER — DIPHENHYDRAMINE HYDROCHLORIDE 50 MG/ML
50 INJECTION INTRAMUSCULAR; INTRAVENOUS
Status: CANCELLED
Start: 2023-06-30

## 2023-06-29 RX ORDER — METHYLPREDNISOLONE SODIUM SUCCINATE 125 MG/2ML
125 INJECTION, POWDER, LYOPHILIZED, FOR SOLUTION INTRAMUSCULAR; INTRAVENOUS
Status: CANCELLED
Start: 2023-06-30

## 2023-06-29 RX ORDER — HEPARIN SODIUM,PORCINE 10 UNIT/ML
5-20 VIAL (ML) INTRAVENOUS DAILY PRN
Status: CANCELLED | OUTPATIENT
Start: 2023-06-30

## 2023-06-29 RX ORDER — MEPERIDINE HYDROCHLORIDE 25 MG/ML
25 INJECTION INTRAMUSCULAR; INTRAVENOUS; SUBCUTANEOUS EVERY 30 MIN PRN
Status: CANCELLED | OUTPATIENT
Start: 2023-06-30

## 2023-06-29 RX ORDER — EPINEPHRINE 1 MG/ML
0.3 INJECTION, SOLUTION INTRAMUSCULAR; SUBCUTANEOUS EVERY 5 MIN PRN
Status: CANCELLED | OUTPATIENT
Start: 2023-06-30

## 2023-06-29 RX ORDER — HEPARIN SODIUM (PORCINE) LOCK FLUSH IV SOLN 100 UNIT/ML 100 UNIT/ML
5 SOLUTION INTRAVENOUS
Status: CANCELLED | OUTPATIENT
Start: 2023-06-30

## 2023-06-29 RX ORDER — ALBUTEROL SULFATE 0.83 MG/ML
2.5 SOLUTION RESPIRATORY (INHALATION)
Status: CANCELLED | OUTPATIENT
Start: 2023-06-30

## 2023-06-29 RX ORDER — ALBUTEROL SULFATE 90 UG/1
1-2 AEROSOL, METERED RESPIRATORY (INHALATION)
Status: CANCELLED
Start: 2023-06-30

## 2023-06-29 RX ADMIN — SODIUM CHLORIDE 1000 MG: 9 INJECTION, SOLUTION INTRAVENOUS at 14:16

## 2023-06-29 NOTE — PATIENT INSTRUCTIONS
Dear Nickie Garcia    Thank you for choosing [facility] ([facility]) for your infusion.  The following information is a summary of our appointment as well as important reminders.          We look forward in seeing you on your next appointment here at [facility]).  Please don t hesitate to call us at [phone] to reschedule any of your appointments or to speak with one of the [facility] registered nurses.  It was a pleasure taking care of you today.    Sincerely,    [facility]  [facility]  [address] [place] [address]  [address]  Phone:  [phone]

## 2023-06-30 ENCOUNTER — INFUSION THERAPY VISIT (OUTPATIENT)
Dept: INFUSION THERAPY | Facility: CLINIC | Age: 43
End: 2023-06-30
Attending: PSYCHIATRY & NEUROLOGY
Payer: COMMERCIAL

## 2023-06-30 VITALS
TEMPERATURE: 98.2 F | DIASTOLIC BLOOD PRESSURE: 81 MMHG | HEART RATE: 79 BPM | RESPIRATION RATE: 16 BRPM | SYSTOLIC BLOOD PRESSURE: 132 MMHG | OXYGEN SATURATION: 99 %

## 2023-06-30 DIAGNOSIS — G35 MULTIPLE SCLEROSIS (H): Primary | ICD-10-CM

## 2023-06-30 PROCEDURE — 96365 THER/PROPH/DIAG IV INF INIT: CPT

## 2023-06-30 PROCEDURE — 258N000003 HC RX IP 258 OP 636: Performed by: PSYCHIATRY & NEUROLOGY

## 2023-06-30 PROCEDURE — 250N000011 HC RX IP 250 OP 636: Mod: JZ | Performed by: PSYCHIATRY & NEUROLOGY

## 2023-06-30 RX ORDER — HEPARIN SODIUM (PORCINE) LOCK FLUSH IV SOLN 100 UNIT/ML 100 UNIT/ML
5 SOLUTION INTRAVENOUS
Status: CANCELLED | OUTPATIENT
Start: 2023-07-01

## 2023-06-30 RX ORDER — ALBUTEROL SULFATE 0.83 MG/ML
2.5 SOLUTION RESPIRATORY (INHALATION)
Status: CANCELLED | OUTPATIENT
Start: 2023-07-01

## 2023-06-30 RX ORDER — ALBUTEROL SULFATE 90 UG/1
1-2 AEROSOL, METERED RESPIRATORY (INHALATION)
Status: CANCELLED
Start: 2023-07-01

## 2023-06-30 RX ORDER — EPINEPHRINE 1 MG/ML
0.3 INJECTION, SOLUTION INTRAMUSCULAR; SUBCUTANEOUS EVERY 5 MIN PRN
Status: CANCELLED | OUTPATIENT
Start: 2023-07-01

## 2023-06-30 RX ORDER — DIPHENHYDRAMINE HYDROCHLORIDE 50 MG/ML
50 INJECTION INTRAMUSCULAR; INTRAVENOUS
Status: CANCELLED
Start: 2023-07-01

## 2023-06-30 RX ORDER — MEPERIDINE HYDROCHLORIDE 25 MG/ML
25 INJECTION INTRAMUSCULAR; INTRAVENOUS; SUBCUTANEOUS EVERY 30 MIN PRN
Status: CANCELLED | OUTPATIENT
Start: 2023-07-01

## 2023-06-30 RX ORDER — METHYLPREDNISOLONE SODIUM SUCCINATE 125 MG/2ML
125 INJECTION, POWDER, LYOPHILIZED, FOR SOLUTION INTRAMUSCULAR; INTRAVENOUS
Status: CANCELLED
Start: 2023-07-01

## 2023-06-30 RX ORDER — HEPARIN SODIUM,PORCINE 10 UNIT/ML
5-20 VIAL (ML) INTRAVENOUS DAILY PRN
Status: CANCELLED | OUTPATIENT
Start: 2023-07-01

## 2023-06-30 RX ADMIN — SODIUM CHLORIDE 1000 MG: 9 INJECTION, SOLUTION INTRAVENOUS at 10:01

## 2023-06-30 NOTE — PROGRESS NOTES
"Chief Complaint   Patient presents with     Infusion     IV Solu-Medrol     Infusion Nursing Note:  Nickie Garcia presents today for IV Solu-Medrol dose 2/5.    Patient seen by provider today: No   present during visit today: Not Applicable.    Note: Solu-Medrol infused over 1 hour.      Intravenous Access:  Peripheral IV placed.  No labs die.     Treatment Conditions:  Not Applicable.      Post Infusion Assessment:  Patient tolerated infusion without incident.  Blood return noted pre and post infusion.  Site patent and intact, free from redness, edema or discomfort.  No evidence of extravasations.  Access discontinued per protocol.       Discharge Plan:   [facility]  Patient will return tomorrow for next appointment.   Patient discharged in stable condition accompanied by: self.  Departure Mode: Ambulatory.    Administrations This Visit     methylPREDNISolone sodium succinate (solu-MEDROL) 1,000 mg in sodium chloride 0.9 % 283 mL intermittent infusion     Admin Date  06/30/2023 Action  $New Bag Dose  1,000 mg Rate  283 mL/hr Route  Intravenous Administered By  Pillo Xie RN              Vital signs:  Temp: 98.2  F (36.8  C) Temp src: Oral BP: (!) 142/81 Pulse: 76   Resp: 18 SpO2: 99 % O2 Device: None (Room air)        Estimated body mass index is 27.45 kg/m  as calculated from the following:    Height as of 6/27/23: 1.773 m (5' 9.8\").    Weight as of 6/27/23: 86.3 kg (190 lb 3.2 oz).        "

## 2023-07-01 ENCOUNTER — INFUSION THERAPY VISIT (OUTPATIENT)
Dept: INFUSION THERAPY | Facility: CLINIC | Age: 43
End: 2023-07-01
Attending: PSYCHIATRY & NEUROLOGY
Payer: COMMERCIAL

## 2023-07-01 VITALS
RESPIRATION RATE: 16 BRPM | TEMPERATURE: 98.2 F | OXYGEN SATURATION: 100 % | DIASTOLIC BLOOD PRESSURE: 82 MMHG | HEART RATE: 71 BPM | SYSTOLIC BLOOD PRESSURE: 130 MMHG

## 2023-07-01 DIAGNOSIS — G35 MULTIPLE SCLEROSIS (H): Primary | ICD-10-CM

## 2023-07-01 PROCEDURE — 258N000003 HC RX IP 258 OP 636: Performed by: PSYCHIATRY & NEUROLOGY

## 2023-07-01 PROCEDURE — 250N000011 HC RX IP 250 OP 636: Performed by: PSYCHIATRY & NEUROLOGY

## 2023-07-01 PROCEDURE — 96365 THER/PROPH/DIAG IV INF INIT: CPT

## 2023-07-01 RX ORDER — METHYLPREDNISOLONE SODIUM SUCCINATE 125 MG/2ML
125 INJECTION, POWDER, LYOPHILIZED, FOR SOLUTION INTRAMUSCULAR; INTRAVENOUS
Status: CANCELLED
Start: 2023-07-02

## 2023-07-01 RX ORDER — MEPERIDINE HYDROCHLORIDE 25 MG/ML
25 INJECTION INTRAMUSCULAR; INTRAVENOUS; SUBCUTANEOUS EVERY 30 MIN PRN
Status: CANCELLED | OUTPATIENT
Start: 2023-07-02

## 2023-07-01 RX ORDER — ALBUTEROL SULFATE 90 UG/1
1-2 AEROSOL, METERED RESPIRATORY (INHALATION)
Status: CANCELLED
Start: 2023-07-02

## 2023-07-01 RX ORDER — HEPARIN SODIUM (PORCINE) LOCK FLUSH IV SOLN 100 UNIT/ML 100 UNIT/ML
5 SOLUTION INTRAVENOUS
Status: CANCELLED | OUTPATIENT
Start: 2023-07-02

## 2023-07-01 RX ORDER — HEPARIN SODIUM,PORCINE 10 UNIT/ML
5-20 VIAL (ML) INTRAVENOUS DAILY PRN
Status: CANCELLED | OUTPATIENT
Start: 2023-07-02

## 2023-07-01 RX ORDER — EPINEPHRINE 1 MG/ML
0.3 INJECTION, SOLUTION INTRAMUSCULAR; SUBCUTANEOUS EVERY 5 MIN PRN
Status: CANCELLED | OUTPATIENT
Start: 2023-07-02

## 2023-07-01 RX ORDER — ALBUTEROL SULFATE 0.83 MG/ML
2.5 SOLUTION RESPIRATORY (INHALATION)
Status: CANCELLED | OUTPATIENT
Start: 2023-07-02

## 2023-07-01 RX ORDER — DIPHENHYDRAMINE HYDROCHLORIDE 50 MG/ML
50 INJECTION INTRAMUSCULAR; INTRAVENOUS
Status: CANCELLED
Start: 2023-07-02

## 2023-07-01 RX ADMIN — METHYLPREDNISOLONE SODIUM SUCCINATE 1000 MG: 1 INJECTION, POWDER, LYOPHILIZED, FOR SOLUTION INTRAMUSCULAR; INTRAVENOUS at 11:12

## 2023-07-01 NOTE — PROGRESS NOTES
Nursing Note  Nickie Garcia [facility] for:   Chief Complaint   Patient presents with     Infusion     IV Solumedrol       During today's [facility] appointment, orders from Dr LEN Cronin were completed.  Frequency: today is dose 3 of 5 total    Progress note:  Patient identification verified by name and date of birth.  Assessment completed.  Vitals recorded in Doc Flowsheets.  Patient was provided with education regarding medication/procedure and possible side effects.  Patient verbalized understanding.     present during visit today: Not Applicable.    Treatment Conditions: Non-applicable.    Premedications: were not ordered.    Drug Waste Record: No    Infusion length and rate:  infusion given over approximately 1 hours    Labs: were not ordered for this appointment.    Vascular access: peripheral IV placed today.    Is the next appt scheduled? Yes    Post Infusion Assessment:  Patient tolerated infusion without incident.  Blood return noted pre and post infusion.  Site patent and intact, free from redness, edema or discomfort.  No evidence of extravasations.  Access discontinued per protocol.     Discharge Plan:   Follow up plan of care with: ongoing infusions at [facility].  Discharge instructions were reviewed with patient.  Patient/representative verbalized understanding of discharge instructions and all questions answered.  Patient discharged from [facility] in stable condition.  Patient declined AVS.    Annette Castillo RN    Administrations This Visit     methylPREDNISolone sodium succinate (solu-MEDROL) 1,000 mg in sodium chloride 0.9 % 283 mL intermittent infusion     Admin Date  07/01/2023 Action  $New Bag Dose  1,000 mg Rate  283 mL/hr Route  Intravenous Administered By  Annette Castillo RN                BP (!) 143/81 (BP Location: Left arm,  Patient Position: Sitting, Cuff Size: Adult Regular)   Pulse 71   Temp 98.2  F (36.8  C) (Oral)   Resp 16   LMP  (LMP Unknown)   SpO2 98%

## 2023-07-02 ENCOUNTER — INFUSION THERAPY VISIT (OUTPATIENT)
Dept: INFUSION THERAPY | Facility: CLINIC | Age: 43
End: 2023-07-02
Attending: PSYCHIATRY & NEUROLOGY
Payer: COMMERCIAL

## 2023-07-02 VITALS
DIASTOLIC BLOOD PRESSURE: 84 MMHG | HEART RATE: 64 BPM | RESPIRATION RATE: 16 BRPM | TEMPERATURE: 98 F | OXYGEN SATURATION: 99 % | SYSTOLIC BLOOD PRESSURE: 148 MMHG

## 2023-07-02 DIAGNOSIS — G35 MULTIPLE SCLEROSIS (H): Primary | ICD-10-CM

## 2023-07-02 PROCEDURE — 258N000003 HC RX IP 258 OP 636: Performed by: PSYCHIATRY & NEUROLOGY

## 2023-07-02 PROCEDURE — 250N000011 HC RX IP 250 OP 636: Mod: JZ | Performed by: PSYCHIATRY & NEUROLOGY

## 2023-07-02 PROCEDURE — 96365 THER/PROPH/DIAG IV INF INIT: CPT

## 2023-07-02 RX ORDER — HEPARIN SODIUM,PORCINE 10 UNIT/ML
5-20 VIAL (ML) INTRAVENOUS DAILY PRN
Status: CANCELLED | OUTPATIENT
Start: 2023-07-03

## 2023-07-02 RX ORDER — ALBUTEROL SULFATE 0.83 MG/ML
2.5 SOLUTION RESPIRATORY (INHALATION)
Status: CANCELLED | OUTPATIENT
Start: 2023-07-03

## 2023-07-02 RX ORDER — DIPHENHYDRAMINE HYDROCHLORIDE 50 MG/ML
50 INJECTION INTRAMUSCULAR; INTRAVENOUS
Status: CANCELLED
Start: 2023-07-03

## 2023-07-02 RX ORDER — EPINEPHRINE 1 MG/ML
0.3 INJECTION, SOLUTION INTRAMUSCULAR; SUBCUTANEOUS EVERY 5 MIN PRN
Status: CANCELLED | OUTPATIENT
Start: 2023-07-03

## 2023-07-02 RX ORDER — HEPARIN SODIUM (PORCINE) LOCK FLUSH IV SOLN 100 UNIT/ML 100 UNIT/ML
5 SOLUTION INTRAVENOUS
Status: CANCELLED | OUTPATIENT
Start: 2023-07-03

## 2023-07-02 RX ORDER — MEPERIDINE HYDROCHLORIDE 25 MG/ML
25 INJECTION INTRAMUSCULAR; INTRAVENOUS; SUBCUTANEOUS EVERY 30 MIN PRN
Status: CANCELLED | OUTPATIENT
Start: 2023-07-03

## 2023-07-02 RX ORDER — ALBUTEROL SULFATE 90 UG/1
1-2 AEROSOL, METERED RESPIRATORY (INHALATION)
Status: CANCELLED
Start: 2023-07-03

## 2023-07-02 RX ORDER — METHYLPREDNISOLONE SODIUM SUCCINATE 125 MG/2ML
125 INJECTION, POWDER, LYOPHILIZED, FOR SOLUTION INTRAMUSCULAR; INTRAVENOUS
Status: CANCELLED
Start: 2023-07-03

## 2023-07-02 RX ADMIN — SODIUM CHLORIDE 1000 MG: 9 INJECTION, SOLUTION INTRAVENOUS at 07:54

## 2023-07-02 ASSESSMENT — PAIN SCALES - GENERAL: PAINLEVEL: NO PAIN (0)

## 2023-07-02 NOTE — PROGRESS NOTES
Infusion Nursing Note:  Nickie DURAN Beverlyjenae presents today for Solumedrol.    Patient seen by provider today: No   present during visit today: Not Applicable.    Note: N/A.    Intravenous Access:  Peripheral IV placed.    Treatment Conditions:  NA    Post Infusion Assessment:  Patient tolerated infusion without incident.  Site patent and intact, free from redness, edema or discomfort.  Access discontinued per protocol.     Discharge Plan:   Patient and/or family verbalized understanding of discharge instructions and all questions answered.  Patient discharged in stable condition accompanied by: self.    Administrations This Visit     methylPREDNISolone sodium succinate (solu-MEDROL) 1,000 mg in sodium chloride 0.9 % 291 mL intermittent infusion     Admin Date  07/02/2023 Action  $New Bag Dose  1,000 mg Rate  291 mL/hr Route  Intravenous Administered By  Agnieszka Pimentel RN Alyssa Sakhitab-Kerestes, RN

## 2023-07-02 NOTE — PATIENT INSTRUCTIONS
Dear Nickie Garcia    Thank you for choosing [facility] ([facility]) for your infusion.  The following information is a summary of our appointment as well as important reminders.      We look forward in seeing you on your next appointment here at [facility] ([facility]).  Please don t hesitate to call us at [phone] to reschedule any of your appointments or to speak with one of the [facility] registered nurses.  It was a pleasure taking care of you today.    Sincerely,    [facility]  [facility]  [address] [place] [address]  [address]  Phone:  [phone]

## 2023-07-03 ENCOUNTER — INFUSION THERAPY VISIT (OUTPATIENT)
Dept: INFUSION THERAPY | Facility: CLINIC | Age: 43
End: 2023-07-03
Attending: PSYCHIATRY & NEUROLOGY
Payer: COMMERCIAL

## 2023-07-03 VITALS
SYSTOLIC BLOOD PRESSURE: 146 MMHG | TEMPERATURE: 98.2 F | DIASTOLIC BLOOD PRESSURE: 86 MMHG | HEART RATE: 76 BPM | OXYGEN SATURATION: 98 % | RESPIRATION RATE: 16 BRPM

## 2023-07-03 DIAGNOSIS — G35 MULTIPLE SCLEROSIS (H): Primary | ICD-10-CM

## 2023-07-03 PROCEDURE — 258N000003 HC RX IP 258 OP 636: Performed by: PSYCHIATRY & NEUROLOGY

## 2023-07-03 PROCEDURE — 250N000011 HC RX IP 250 OP 636: Performed by: PSYCHIATRY & NEUROLOGY

## 2023-07-03 PROCEDURE — 96365 THER/PROPH/DIAG IV INF INIT: CPT

## 2023-07-03 RX ORDER — ALBUTEROL SULFATE 0.83 MG/ML
2.5 SOLUTION RESPIRATORY (INHALATION)
Status: CANCELLED | OUTPATIENT
Start: 2023-07-03

## 2023-07-03 RX ORDER — METHYLPREDNISOLONE SODIUM SUCCINATE 125 MG/2ML
125 INJECTION, POWDER, LYOPHILIZED, FOR SOLUTION INTRAMUSCULAR; INTRAVENOUS
Status: CANCELLED
Start: 2023-07-03

## 2023-07-03 RX ORDER — HEPARIN SODIUM (PORCINE) LOCK FLUSH IV SOLN 100 UNIT/ML 100 UNIT/ML
5 SOLUTION INTRAVENOUS
Status: CANCELLED | OUTPATIENT
Start: 2023-07-03

## 2023-07-03 RX ORDER — EPINEPHRINE 1 MG/ML
0.3 INJECTION, SOLUTION INTRAMUSCULAR; SUBCUTANEOUS EVERY 5 MIN PRN
Status: CANCELLED | OUTPATIENT
Start: 2023-07-03

## 2023-07-03 RX ORDER — MEPERIDINE HYDROCHLORIDE 25 MG/ML
25 INJECTION INTRAMUSCULAR; INTRAVENOUS; SUBCUTANEOUS EVERY 30 MIN PRN
Status: CANCELLED | OUTPATIENT
Start: 2023-07-03

## 2023-07-03 RX ORDER — HEPARIN SODIUM,PORCINE 10 UNIT/ML
5-20 VIAL (ML) INTRAVENOUS DAILY PRN
Status: CANCELLED | OUTPATIENT
Start: 2023-07-03

## 2023-07-03 RX ORDER — DIPHENHYDRAMINE HYDROCHLORIDE 50 MG/ML
50 INJECTION INTRAMUSCULAR; INTRAVENOUS
Status: CANCELLED
Start: 2023-07-03

## 2023-07-03 RX ORDER — ALBUTEROL SULFATE 90 UG/1
1-2 AEROSOL, METERED RESPIRATORY (INHALATION)
Status: CANCELLED
Start: 2023-07-03

## 2023-07-03 RX ADMIN — SODIUM CHLORIDE 1000 MG: 9 INJECTION, SOLUTION INTRAVENOUS at 08:35

## 2023-07-03 NOTE — PATIENT INSTRUCTIONS
Dear Nickie Garcia    Thank you for choosing [facility] ([facility]) for your infusion.  The following information is a summary of our appointment as well as important reminders.      We look forward in seeing you on your next appointment here at [facility] ([facility]).  Please don t hesitate to call us at [phone] to reschedule any of your appointments or to speak with one of the [facility] registered nurses.  It was a pleasure taking care of you today.    Sincerely,    [facility]  [facility]  [address] [place] [address]  [address]  Phone:  [phone]

## 2023-07-03 NOTE — PROGRESS NOTES
Infusion Nursing Note:  Nickie Garcia presents today for Solumedrol infusion.    Patient seen by provider today: No   present during visit today: Not Applicable.    Note: Patient identification verified by name and date of birth.  Assessment completed.  Vitals recorded in Doc Flowsheets.  Patient was provided with education regarding medication/procedure and possible side effects.  Patient verbalized understanding.    During today's [facility] appointment, orders from Valery Cronin MD were completed.     methylPREDNISolone sodium succinate (solu-MEDROL) 1,000 mg in sodium chloride 0.9 % 291 mL intermittent infusion  1,000 mg, Intravenous, Administer over 1 Hours, today was day 5/5.    Intravenous Access:  Peripheral IV placed.    Post Infusion Assessment:  Patient tolerated infusion without incident.  Blood return noted pre and post infusion.  No evidence of extravasations.  Access discontinued per protocol.       Discharge Plan:   AVS to patient via MYCHART.  Patient discharged in stable condition accompanied by: self.  Departure Mode: Ambulatory.    Administrations This Visit     methylPREDNISolone sodium succinate (solu-MEDROL) 1,000 mg in sodium chloride 0.9 % 291 mL intermittent infusion     Admin Date  07/03/2023 Action  $New Bag Dose  1,000 mg Route  Intravenous Administered By  Marianna Wilder, RN              Marianna Wilder, RN

## 2023-07-05 LAB — SCANNED LAB RESULT: ABNORMAL

## 2023-07-24 ENCOUNTER — HOSPITAL ENCOUNTER (OUTPATIENT)
Dept: MRI IMAGING | Facility: CLINIC | Age: 43
Discharge: HOME | End: 2023-07-24
Attending: PSYCHIATRY & NEUROLOGY
Payer: COMMERCIAL

## 2023-07-24 ENCOUNTER — TELEPHONE (OUTPATIENT)
Dept: OPHTHALMOLOGY | Facility: CLINIC | Age: 43
End: 2023-07-24

## 2023-07-24 DIAGNOSIS — G35 MS (MULTIPLE SCLEROSIS) (H): ICD-10-CM

## 2023-07-24 PROCEDURE — 72157 MRI CHEST SPINE W/O & W/DYE: CPT

## 2023-07-24 PROCEDURE — A9585 GADOBUTROL INJECTION: HCPCS | Performed by: PSYCHIATRY & NEUROLOGY

## 2023-07-24 PROCEDURE — 72156 MRI NECK SPINE W/O & W/DYE: CPT

## 2023-07-24 PROCEDURE — 255N000002 HC RX 255 OP 636: Performed by: PSYCHIATRY & NEUROLOGY

## 2023-07-24 RX ORDER — GADOBUTROL 604.72 MG/ML
8.5 INJECTION INTRAVENOUS ONCE
Status: COMPLETED | OUTPATIENT
Start: 2023-07-24 | End: 2023-07-24

## 2023-07-24 RX ADMIN — GADOBUTROL 8.5 ML: 604.72 INJECTION INTRAVENOUS at 08:12

## 2023-07-24 NOTE — TELEPHONE ENCOUNTER
Spoke with patient regarding confirming appt for eye clinic on 7/26. Patient stated she would not be able to make appt due to work conflict. Patient is rescheduled accordingly.

## 2023-07-25 NOTE — TELEPHONE ENCOUNTER
FUTURE VISIT INFORMATION      FUTURE VISIT INFORMATION:  Date: 9/6/23  Time: 8:00am  Location: [facility]  REFERRAL INFORMATION:  Reason for visit/diagnosis  bilateral ELIU in the setting of MS, post hospital follow up     RECORDS REQUESTED FROM:         Clinic name Comments Records Status Imaging Status   [facility] ED ED visit 6/12/23, ED visit 6/10/23 EPIC     Imaging MR Brain 6/10/23  MR Brain 11/5/21 [facility] PAC

## 2023-07-26 ENCOUNTER — PRE VISIT (OUTPATIENT)
Dept: OPHTHALMOLOGY | Facility: CLINIC | Age: 43
End: 2023-07-26

## 2023-08-09 ENCOUNTER — VIRTUAL VISIT (OUTPATIENT)
Dept: NEUROLOGY | Facility: CLINIC | Age: 43
End: 2023-08-09
Attending: PSYCHIATRY & NEUROLOGY
Payer: COMMERCIAL

## 2023-08-09 DIAGNOSIS — G35 MS (MULTIPLE SCLEROSIS) (H): Primary | ICD-10-CM

## 2023-08-09 PROCEDURE — 99214 OFFICE O/P EST MOD 30 MIN: CPT | Mod: VID | Performed by: PSYCHIATRY & NEUROLOGY

## 2023-08-09 NOTE — PATIENT INSTRUCTIONS
You were seen today for MS     Your MRI of the orbits reveals growth of a lesion in the back of your brainstem   There are possibly two small new lesions in your cervical spine     We will start the process for you to start kesimpta     Follow up with me in 3 months

## 2023-08-09 NOTE — NURSING NOTE
Is the patient currently in the state of [place]? VIDEO    Visit mode:VIDEO    If the visit is dropped, the patient can be reconnected by: VIDEO VISIT: Text to cell phone: [phone]    Will anyone else be joining the visit? NO      How would you like to obtain your AVS? MyChart    Are changes needed to the allergy or medication list? YES: PT has medications flagged for removal     Reason for visit: RECHECK (MS follow up to discuss MRI results )    Daly Louie on 8/9/2023 at 10:16 AM

## 2023-08-09 NOTE — LETTER
8/9/2023       RE: Nickie Garcia  [address]  [facility] [address]       Dear Colleague,    Thank you for referring your patient, Nickie Garcia, to the [facility] [place] at [facility]. Please see a copy of my visit note below.    Date of Service: 8/9/2023    [facility] Neurology   MS Clinic Follow-up     Subjective: 44 y/o woman with IBS who presents for evaluation of MS     Last visit was having blurred vision from both eye and light sensitivity. Gait imbalance.  Due to concern for relapse she was given IV steroids.  She has fortunately noticed improvement in symptoms.  Vision is now back to normal. No light sensitivity or blurred vision.      She still has some gait instability.  However, this is gradually improving     She is motivated to resume MS treatment.      Recall that she has tried glatiramer and copaxone in the psat. They both caused significant injection site reactions.      Allergies   Allergen Reactions    Wellbutrin [Bupropion] Other (See Comments)     Optic neuritis       Current Outpatient Medications   Medication    amLODIPine (NORVASC) 5 MG tablet    ibuprofen (ADVIL/MOTRIN) 600 MG tablet    ibuprofen (ADVIL/MOTRIN) 800 MG tablet    levothyroxine (SYNTHROID) 200 MCG tablet    oxyCODONE (ROXICODONE) 5 MG tablet    varenicline (CHANTIX BEHZAD) 0.5 MG X 11 & 1 MG X 42 tablet    Vitamin D3 (CHOLECALCIFEROL) 125 MCG (5000 UT) tablet     No current facility-administered medications for this visit.      Past medical, surgical, social and family history was personally reviewed. Pertinent details noted above.     Physical Examination:   LMP  (LMP Unknown)     General: no acute distress    Tests/Imaging:   MRI brain   6/2023 - no optic neuritis, but +posterior pontine lesion     MRI cervical spine   7/2023 - ?new small lesions at c3, c7    MRI thoracic spine   7/2023 - chronic lesion at T2    Assessment: 43  y/o woman with RRMS who suffered a recent brainstem relapse with significant symptoms of diplopia and gait instability.  She has experienced improvement in symptoms. She is motivated to resume disease modifying therapy.     Due to the severity of her most recent attack and the fact that she had a new lesion in the brainstem, it is appropriate for her to start a highly efficacious therapy.  After discussion she was agreeable to starting kesimpta.  She understands that this medication is immune suppressing.     Plan:   - start kesimpta   - fllow up in 3 months     Note was completed with the assistance of Dragon Fluency software which can often result in accidental word substitutions.     A total of 30 minutes on the date of service were spent in the care of this patient.   Valery Cronin MD on 8/9/2023 at 11:04 AM      Again, thank you for allowing me to participate in the care of your patient.      Sincerely,    Valery Cronin MD

## 2023-08-09 NOTE — PROGRESS NOTES
Date of Service: 8/9/2023    [facility] Neurology   MS Clinic Follow-up     Subjective: 42 y/o woman with IBS who presents for evaluation of MS     Last visit was having blurred vision from both eye and light sensitivity. Gait imbalance.  Due to concern for relapse she was given IV steroids.  She has fortunately noticed improvement in symptoms.  Vision is now back to normal. No light sensitivity or blurred vision.      She still has some gait instability.  However, this is gradually improving     She is motivated to resume MS treatment.      Recall that she has tried glatiramer and copaxone in the psat. They both caused significant injection site reactions.      Allergies   Allergen Reactions    Wellbutrin [Bupropion] Other (See Comments)     Optic neuritis       Current Outpatient Medications   Medication    amLODIPine (NORVASC) 5 MG tablet    ibuprofen (ADVIL/MOTRIN) 600 MG tablet    ibuprofen (ADVIL/MOTRIN) 800 MG tablet    levothyroxine (SYNTHROID) 200 MCG tablet    oxyCODONE (ROXICODONE) 5 MG tablet    varenicline (CHANTIX BEHZAD) 0.5 MG X 11 & 1 MG X 42 tablet    Vitamin D3 (CHOLECALCIFEROL) 125 MCG (5000 UT) tablet     No current facility-administered medications for this visit.        Past medical, surgical, social and family history was personally reviewed. Pertinent details noted above.     Physical Examination:   LMP  (LMP Unknown)     General: no acute distress    Tests/Imaging:   MRI brain   6/2023 - no optic neuritis, but +posterior pontine lesion     MRI cervical spine   7/2023 - ?new small lesions at c3, c7    MRI thoracic spine   7/2023 - chronic lesion at T2    Assessment: 42 y/o woman with RRMS who suffered a recent brainstem relapse with significant symptoms of diplopia and gait instability.  She has experienced improvement in symptoms. She is motivated to resume disease modifying therapy.     Due to the severity of her most recent attack and the fact that she had a new lesion in the brainstem, it is  appropriate for her to start a highly efficacious therapy.  After discussion she was agreeable to starting kesimpta.  She understands that this medication is immune suppressing.     Plan:   - start kesimpta   - fllow up in 3 months     Note was completed with the assistance of Dragon Fluency software which can often result in accidental word substitutions.     A total of 30 minutes on the date of service were spent in the care of this patient.   Valery Cronin MD on 8/9/2023 at 11:04 AM      Virtual Visit Details    Type of service:  Video Visit     Originating Location (pt. Location): Other work    Distant Location (provider location):  On-site  Platform used for Video Visit: Melissa

## 2023-09-05 ENCOUNTER — TELEPHONE (OUTPATIENT)
Dept: OPHTHALMOLOGY | Facility: CLINIC | Age: 43
End: 2023-09-05
Payer: COMMERCIAL

## 2023-09-05 NOTE — TELEPHONE ENCOUNTER
LVM for patient confirming appointment on 9/6/23. Provided [facility] number for any scheduling conflicts.

## 2023-09-06 ENCOUNTER — PRE VISIT (OUTPATIENT)
Dept: OPHTHALMOLOGY | Facility: CLINIC | Age: 43
End: 2023-09-06

## 2023-09-06 DIAGNOSIS — H53.10 SUBJECTIVE VISUAL DISTURBANCE: Primary | ICD-10-CM

## 2023-10-10 DIAGNOSIS — E06.3 HYPOTHYROIDISM DUE TO HASHIMOTO'S THYROIDITIS: ICD-10-CM

## 2023-10-10 DIAGNOSIS — I10 ESSENTIAL HYPERTENSION: ICD-10-CM

## 2023-10-10 RX ORDER — LEVOTHYROXINE SODIUM 200 UG/1
TABLET ORAL
Qty: 90 TABLET | Refills: 0 | Status: SHIPPED | OUTPATIENT
Start: 2023-10-10 | End: 2023-12-07

## 2023-10-10 RX ORDER — AMLODIPINE BESYLATE 5 MG/1
5 TABLET ORAL DAILY
Qty: 90 TABLET | Refills: 0 | Status: SHIPPED | OUTPATIENT
Start: 2023-10-10 | End: 2023-12-07

## 2023-10-10 NOTE — TELEPHONE ENCOUNTER
Refill completed.    Needs in person appointment and fasting labs   OK to do as physical and OK to use same day slot

## 2023-10-10 NOTE — TELEPHONE ENCOUNTER
I have refilled the prescription. Advise taking the levothyroxine consistently and recheck lab in 8 weeks. She may then continue follow-up with primary care. Please notify.   Dipesh Ni MD     Inpatient Rehabilitation - Physical Therapy Treatment Note       ABENA Quiroz     Patient Name: Ofelia Knox  : 1947  MRN: [id number]    Today's Date: 10/10/2023                    Admit Date: 2023      Visit Dx:   No diagnosis found.    Patient Active Problem List   Diagnosis    CVA (cerebral vascular accident)       Past Medical History:   Diagnosis Date    AMS (altered mental status)     Aphasia     CKD (chronic kidney disease)     COPD (chronic obstructive pulmonary disease)     CVA (cerebral vascular accident)     DM2 (diabetes mellitus, type 2)     HTN (hypertension)     Restrictive lung disease     Urinary tract infection due to ESBL Klebsiella        Past Surgical History:   Procedure Laterality Date    HYSTERECTOMY      JOINT REPLACEMENT         PT ASSESSMENT (last 12 hours)       IRF PT Evaluation and Treatment       Row Name 10/10/23 142          PT Time and Intention    Document Type daily treatment  -LL     Mode of Treatment individual therapy;physical therapy  -LL     Patient/Family/Caregiver Comments/Observations Patient had no new c/o this date and was agreeable to PT participation  -LL       Row Name 10/10/23 1421          General Information    Existing Precautions/Restrictions fall  confusion  -LL       Row Name 10/10/23 1421          Pain Scale: FACES Pre/Post-Treatment    Pain: FACES Scale, Pretreatment 0-->no hurt  -LL     Posttreatment Pain Rating 0-->no hurt  -LL       Row Name 10/10/23 1421          Cognition/Psychosocial    Affect/Mental Status (Cognition) WFL  Pleasantly confused  -LL     Orientation Status (Cognition) oriented to;person;place;situation  -LL     Follows Commands (Cognition) physical/tactile prompts required  -LL     Personal Safety Interventions gait belt;nonskid shoes/slippers when out of bed;supervised activity  -LL       Row Name 10/10/23 142          Bed Mobility    Supine-Sit-Supine [place] (Bed Mobility) modified independence  -LL     Assistive Device  (Bed Mobility) bed rails  -LL       Row Name 10/10/23 1421          Bed-Chair Transfer    Bed-Chair [place] (Transfers) verbal cues;nonverbal cues (demo/gesture);supervision  -LL     Assistive Device (Bed-Chair Transfers) wheelchair  -LL       Row Name 10/10/23 1421          Chair-Bed Transfer    Chair-Bed [place] (Transfers) verbal cues;nonverbal cues (demo/gesture);supervision  -LL     Assistive Device (Chair-Bed Transfers) wheelchair  -LL       Row Name 10/10/23 1421          Sit-Stand Transfer    Sit-Stand [place] (Transfers) verbal cues;nonverbal cues (demo/gesture);supervision  -LL     Assistive Device (Sit-Stand Transfers) walker, front-wheeled  -LL       Row Name 10/10/23 1421          Stand-Sit Transfer    Stand-Sit [place] (Transfers) verbal cues;nonverbal cues (demo/gesture);supervision  -LL     Assistive Device (Stand-Sit Transfers) walker, front-wheeled  -LL       Row Name 10/10/23 1421          Stand Pivot/Stand Step Transfer    Stand Pivot/Stand Step [place] (Transfers) supervision;verbal cues;nonverbal cues (demo/gesture)  -LL     Assistive Device (Stand Pivot Stand Step Transfer) walker, front-wheeled  -LL       Row Name 10/10/23 1421          Gait/Stairs (Locomotion)    [place] Level (Gait) verbal cues;nonverbal cues (demo/gesture);supervision  -LL     Assistive Device (Gait) walker, front-wheeled  -LL     Distance in Feet (Gait) 200' in room d/t isolation  -LL     Deviations/Abnormal Patterns (Gait) beatrice decreased;gait speed decreased;stride length decreased  -LL     Bilateral Gait Deviations forward flexed posture  -LL       Row Name 10/10/23 1421          Hip (Therapeutic Exercise)    Hip Strengthening (Therapeutic Exercise) bilateral;flexion;extension;aBduction;aDduction;marching while seated;marching while standing;mini squats;sitting;standing;resistance band;green  -LL       Row Name 10/10/23 1421          Knee (Therapeutic Exercise)    Knee Strengthening  (Therapeutic Exercise) bilateral;flexion;extension;marching while seated;marching while standing;LAQ (long arc quad);hamstring curls;sitting;standing;resistance band;green  -LL       Row Name 10/10/23 1421          Ankle (Therapeutic Exercise)    Ankle Strengthening (Therapeutic Exercise) bilateral;dorsiflexion;plantarflexion;sitting;standing  -LL       Row Name 10/10/23 1421          Positioning and Restraints    Pre-Treatment Position --  Sitting EOB  -LL     Post Treatment Position bed  -LL     In Bed sitting EOB;call light within reach;encouraged to call for assist;exit alarm on;side rails up x2  With tray table in front of her  -LL       Row Name 10/10/23 1421          Bed Mobility Goal 1 (PT-IRF)    Progress/Outcomes (Bed Mobility Goal 1, PT-IRF) goal met  -LL       Row Name 10/10/23 1421          Transfer Goal 1 (PT-IRF)    Progress/Outcomes (Transfer Goal 1, PT-IRF) goal met  -LL       Row Name 10/10/23 1421          Gait/Walking Locomotion Goal 1 (PT-IRF)    Progress/Outcomes (Gait/Walking Locomotion Goal 1, PT-IRF) goal ongoing  -LL               User Key  (r) = Recorded By, (t) = Taken By, (c) = Cosigned By      Initials Name Provider Type    Eilza Ha PTA Physical Therapist Assistant                     Physical Therapy Education       Title: PT OT SLP Therapies (Done)       Topic: Physical Therapy (Done)       Point: Mobility training (Done)       Learning Progress Summary             Patient Acceptance, E,D, VU,NR by LL at 10/10/2023 1426    Acceptance, E, VU,NR by LB at 10/9/2023 1537    Acceptance, E, VU,NR by LB at 10/6/2023 1430    Acceptance, E, VU,NR by LB at 10/4/2023 1536    Acceptance, E, VU,NR by LB at 10/3/2023 1502    Acceptance, E,D, VU,NR by RG at 9/29/2023 1403    Acceptance, E, VU,NR by LB at 9/28/2023 1501    Acceptance, E, VU,NR by LB at 9/27/2023 1532    Acceptance, E, VU,NR by LB at 9/26/2023 1548    Acceptance, E,D, VU,NR by RG at 9/25/2023 1423    Acceptance, TB, NR by  DL at 9/24/2023 2200    Acceptance, E,D, NR,VU by RG at 9/22/2023 1315    Acceptance, E,D, VU,NR by RG at 9/21/2023 1307    Acceptance, E,D, VU,NR by RG at 9/20/2023 1258    Acceptance, E,D, VU,NR by RG at 9/19/2023 1258    Acceptance, E,D, VU,NR by RG at 9/18/2023 1338    Acceptance, E,TB, VU by RM at 9/15/2023 1532    Acceptance, E,TB, VU by RM at 9/14/2023 1555    Acceptance, E,D, VU,NR by RG at 9/13/2023 1528    Acceptance, TB, NR by DL at 9/12/2023 2003    Acceptance, E, VU,NR by LB at 9/12/2023 1135                         Point: Home exercise program (Done)       Learning Progress Summary             Patient Acceptance, E,D, VU,NR by LL at 10/10/2023 1426    Acceptance, E, VU,NR by LB at 10/9/2023 1537    Acceptance, E, VU,NR by LB at 10/6/2023 1430    Acceptance, E, VU,NR by LB at 10/4/2023 1536    Acceptance, E, VU,NR by LB at 10/3/2023 1502    Acceptance, E,D, VU,NR by RG at 9/29/2023 1403    Acceptance, E, VU,NR by LB at 9/28/2023 1501    Acceptance, E, VU,NR by LB at 9/27/2023 1532    Acceptance, E, VU,NR by LB at 9/26/2023 1548    Acceptance, E,D, VU,NR by RG at 9/25/2023 1423    Acceptance, TB, NR by DL at 9/24/2023 2200    Acceptance, E,D, NR,VU by RG at 9/22/2023 1315    Acceptance, E,D, VU,NR by RG at 9/21/2023 1307    Acceptance, E,D, VU,NR by RG at 9/20/2023 1258    Acceptance, E,D, VU,NR by RG at 9/19/2023 1258    Acceptance, E,D, VU,NR by RG at 9/18/2023 1338    Acceptance, E,TB, VU by RM at 9/15/2023 1532    Acceptance, E,TB, VU by RM at 9/14/2023 1555    Acceptance, E,D, VU,NR by RG at 9/13/2023 1528    Acceptance, TB, NR by DL at 9/12/2023 2003    Acceptance, E, VU,NR by LB at 9/12/2023 1135                         Point: Body mechanics (Done)       Learning Progress Summary             Patient Acceptance, E,D, VU,NR by LL at 10/10/2023 1426    Acceptance, E, VU,NR by LB at 10/9/2023 1537    Acceptance, E, VU,NR by LB at 10/6/2023 1430    Acceptance, E, VU,NR by LB at 10/4/2023 1536     Acceptance, E, VU,NR by LB at 10/3/2023 1502    Acceptance, E,D, VU,NR by RG at 9/29/2023 1403    Acceptance, E, VU,NR by LB at 9/28/2023 1501    Acceptance, E, VU,NR by LB at 9/27/2023 1532    Acceptance, E, VU,NR by LB at 9/26/2023 1548    Acceptance, E,D, VU,NR by RG at 9/25/2023 1423    Acceptance, TB, NR by DL at 9/24/2023 2200    Acceptance, E,D, NR,VU by RG at 9/22/2023 1315    Acceptance, E,D, VU,NR by RG at 9/21/2023 1307    Acceptance, E,D, VU,NR by RG at 9/20/2023 1258    Acceptance, E,D, VU,NR by RG at 9/19/2023 1258    Acceptance, E,D, VU,NR by RG at 9/18/2023 1338    Acceptance, E,TB, VU by RM at 9/15/2023 1532    Acceptance, E,TB, VU by RM at 9/14/2023 1555    Acceptance, E,D, VU,NR by RG at 9/13/2023 1528    Acceptance, TB, NR by DL at 9/12/2023 2003    Acceptance, E, VU,NR by LB at 9/12/2023 1135                         Point: Precautions (Done)       Learning Progress Summary             Patient Acceptance, E,D, VU,NR by LL at 10/10/2023 1426    Acceptance, E, VU,NR by LB at 10/9/2023 1537    Acceptance, E, VU,NR by LB at 10/6/2023 1430    Acceptance, E, VU,NR by LB at 10/4/2023 1536    Acceptance, E, VU,NR by LB at 10/3/2023 1502    Acceptance, E,D, VU,NR by RG at 9/29/2023 1403    Acceptance, E, VU,NR by LB at 9/28/2023 1501    Acceptance, E, VU,NR by LB at 9/27/2023 1532    Acceptance, E, VU,NR by LB at 9/26/2023 1548    Acceptance, E,D, VU,NR by RG at 9/25/2023 1423    Acceptance, TB, NR by DL at 9/24/2023 2200    Acceptance, E,D, NR,VU by RG at 9/22/2023 1315    Acceptance, E,D, VU,NR by RG at 9/21/2023 1307    Acceptance, E,D, VU,NR by RG at 9/20/2023 1258    Acceptance, E,D, VU,NR by RG at 9/19/2023 1258    Acceptance, E,D, VU,NR by RG at 9/18/2023 1338    Acceptance, E,TB, VU by RM at 9/15/2023 1532    Acceptance, E,TB, VU by RM at 9/14/2023 1555    Acceptance, E,D, VU,NR by RG at 9/13/2023 1528    Acceptance, TB, NR by DL at 9/12/2023 2003    Acceptance, E, VU,NR by LB at 9/12/2023 1135                                          User Key       Initials Effective Dates Name Provider Type Discipline    LB 06/16/21 -  Andra Stewart, PT Physical Therapist PT    LL 05/02/16 -  Eliza Ramirez, ESSIE Physical Therapist Assistant PT    RM 02/17/23 -  Karis Obrien, PTA Physical Therapist Assistant PT    RG 06/16/21 -  Twan Cunningham PTA Physical Therapist Assistant PT    DL 03/16/22 -  Carolyn Ramirez, RN Registered Nurse Nurse                    PT Recommendation and Plan                          Time Calculation:      PT Charges       Row Name 10/10/23 1426             Time Calculation    Start Time 0745  -LL      Stop Time 0915  -LL      Time Calculation (min) 90 min  -LL      PT Received On 10/10/23  -LL         Time Calculation- PT    Total Timed Code Minutes- PT 90 minute(s)  -LL                User Key  (r) = Recorded By, (t) = Taken By, (c) = Cosigned By      Initials Name Provider Type    LL Eliza Ramirez, ESSIE Physical Therapist Assistant                    Therapy Charges for Today       Code Description Service Date Service Provider Modifiers Qty    67166790141 HC GAIT TRAINING EA 15 MIN 10/10/2023 Eliza Ramirez PTA GP, CQ 1    17338986059 HC PT NEUROMUSC RE EDUCATION EA 15 MIN 10/10/2023 Eliza Ramirez, ESSIE GP, CQ 1    83905416187 HC PT THERAPEUTIC ACT EA 15 MIN 10/10/2023 Eliza Ramirez, ESSIE GP, CQ 1    36051141253 HC PT THER PROC EA 15 MIN 10/10/2023 Eliza Ramirez PTA GP, CQ 3              PT G-Codes  AM-PAC 6 Clicks Score (PT): 17      Silvana Ramirez PTA  10/10/2023

## 2023-10-12 NOTE — TELEPHONE ENCOUNTER
Pt recently had TSH along with CBC and CMP when she was in for her MS flare up. She said she will call back to sched the fasting lab for her lipids and physical.  LANDON ZhaoI to PCP

## 2023-11-01 ENCOUNTER — TELEPHONE (OUTPATIENT)
Dept: FAMILY MEDICINE | Facility: CLINIC | Age: 43
End: 2023-11-01
Payer: COMMERCIAL

## 2023-11-01 NOTE — TELEPHONE ENCOUNTER
Reason for Call:  Appointment Request    Patient requesting this type of appt:  Preventive     Requested provider: Bonita Mullins    Reason patient unable to be scheduled: Not with their preferred provider    When does patient want to be seen/preferred time:  Before the end of the year. She does not know if she will have insurance after the first of the year, First opening is 1/8/24 with Bonita Mullins.    Comments:     Could we send this information to you in [email or web address] or would you prefer to receive a phone call?:   Patient would prefer a phone call   Okay to leave a detailed message?: Yes at Cell number on file:    Telephone Information:   Mobile [phone]       Call taken on 11/1/2023 at 9:52 AM by Belinda Barrera MA

## 2023-11-02 ENCOUNTER — MYC MEDICAL ADVICE (OUTPATIENT)
Dept: FAMILY MEDICINE | Facility: CLINIC | Age: 43
End: 2023-11-02
Payer: COMMERCIAL

## 2023-11-02 NOTE — TELEPHONE ENCOUNTER
"Routed to PCP to review and advise.   Please advise if pt may be added into a \"Same Day\" or \"Provider Approval Only\" slot, or should pt be advised to schedule with the next available provider with openings in her desired timeframe?    Gosia Casas, BILLN, RN    "

## 2023-12-05 ENCOUNTER — OFFICE VISIT (OUTPATIENT)
Dept: NEUROLOGY | Facility: CLINIC | Age: 43
End: 2023-12-05
Attending: PSYCHIATRY & NEUROLOGY
Payer: COMMERCIAL

## 2023-12-05 VITALS
SYSTOLIC BLOOD PRESSURE: 147 MMHG | BODY MASS INDEX: 27.42 KG/M2 | HEART RATE: 94 BPM | DIASTOLIC BLOOD PRESSURE: 92 MMHG | WEIGHT: 190 LBS | OXYGEN SATURATION: 98 %

## 2023-12-05 DIAGNOSIS — G35 MULTIPLE SCLEROSIS (H): Primary | ICD-10-CM

## 2023-12-05 DIAGNOSIS — Z51.81 THERAPEUTIC DRUG MONITORING: ICD-10-CM

## 2023-12-05 DIAGNOSIS — F17.210 CIGARETTE NICOTINE DEPENDENCE WITHOUT COMPLICATION: ICD-10-CM

## 2023-12-05 PROCEDURE — 99214 OFFICE O/P EST MOD 30 MIN: CPT | Performed by: PSYCHIATRY & NEUROLOGY

## 2023-12-05 RX ORDER — BUPROPION HYDROCHLORIDE 150 MG/1
150 TABLET ORAL EVERY MORNING
Qty: 30 TABLET | Refills: 6 | Status: SHIPPED | OUTPATIENT
Start: 2023-12-05

## 2023-12-05 RX ORDER — UREA 10 %
500 LOTION (ML) TOPICAL DAILY
COMMUNITY

## 2023-12-05 ASSESSMENT — PAIN SCALES - GENERAL: PAINLEVEL: NO PAIN (0)

## 2023-12-05 NOTE — LETTER
12/5/2023       RE: Nickie Garcia  [address]  [place] [facility] [address]       Dear Colleague,    Thank you for referring your patient, Nickie Garcia, to the [facility] [place] at [facility]. Please see a copy of my visit note below.    Date of Service: 12/5/2023    [facility] Neurology   MS Clinic Evaluation    Subjective: 43-year-old woman with a history of hypothyroidism who presents for evaluation of multiple sclerosis.    She does not report any new symptoms related to multiple sclerosis.    Has not experienced any recurrent issues with her vision.  Her balance has improved.    She started kesimpta in September.  She is tolerating the injections well.  She has not experienced any side effects, though she is pretreating with ibuprofen.    She does have a mild cold currently, but has not suffered from any other illnesses.    Disease onset: age 38, L ON  LR age 43 (2023), CN 6 palsy with light sensitivity and balance changes    Prior DMDs:   glatiramer 4/2018-5/2018, large red injection site reactions   Copaxone 5/2018 - 11/2019, elective, family planning  Kesimpta 9/27/2023 - present    Vitamin D: 5000 IU daily      Allergies   Allergen Reactions    Wellbutrin [Bupropion] Other (See Comments)     Optic neuritis       Current Outpatient Medications   Medication    amLODIPine (NORVASC) 5 MG tablet    cyanocobalamin (VITAMIN B-12) 500 MCG tablet    ibuprofen (ADVIL/MOTRIN) 600 MG tablet    levothyroxine (SYNTHROID/LEVOTHROID) 200 MCG tablet    ofatumumab (KESIMPTA) 20 MG/0.4ML injection    Vitamin D3 (CHOLECALCIFEROL) 125 MCG (5000 UT) tablet    ibuprofen (ADVIL/MOTRIN) 800 MG tablet     No current facility-administered medications for this visit.        Past medical, surgical, social and family history was personally reviewed. Pertinent details noted above.     Physical Examination:   BP (!) 147/92 (BP Location: Right  arm, Patient Position: Sitting, Cuff Size: Adult Regular)   Pulse 94   Wt 86.2 kg (190 lb)   SpO2 98%   BMI 27.42 kg/m      General: no acute distress  Cranial nerves:   VFFC  PERRL w/no RAPD  EOM full  Face symmetric  Hearing intact  No dysarthria   Motor:   Tone is normal   Bulk is normal     R L  Deltoid  5 5  Biceps  5 5  Triceps 5 5  Wrist ext 5 5  Finger ext 5 5  Finger abd 5 5    Hip flexion 5 5  Knee flexion 5 5  Knee ext 5 5  Ankle d/f 5 5    Reflexes: 2+ and symmetric throughout, babinski absent bilaterally  Sensory: JPS normal in the toes   Romberg is absent  Coordination: no ataxia or dysmetria  Gait: normal base and stride, tandem gait is intact, able to balance on left foot and hop x 5 on each foot, slight difficulty getting up from chair with single-leg    Tests/Imaging:   Vitamin D 16-> 34  JCV Ab 0.43, reflex pos    ALC 2300   IGG 1012    MRI Brain  6/2023 - overall low lesion burden, no definite lesions in the brainstem, gd-    MRI Cervical spine   11/2021 - dorsal cord lesion t2  7/2023 - ?new small lesions at c3, c7     MRI Thoracic spine   10/2019 - no definite lesions   7/2023 - chronic lesion at T2     Assessment: 43-year-old woman with relapsing remitting multiple sclerosis who is now on kesimpta.  She seems to be tolerating treatment well.  Blood work will be done today to assess for toxicity related to her treatment.    I have recommended radiologic surveillance in 6 to 7 months.    We discussed access to her treatment if she has a disruption in employment.    She is smoking 1 pack of cigarettes per day.  She is motivated to quit.  She is interested in trying bupropion.  Bupropion was listed as an allergy as she developed optic neuritis, though we now know that optic neuritis is secondary to multiple sclerosis.  Therefore bupropion was removed as an allergy.    Plan:   -Continue kesimpta  - Blood work today  - Bupropion trial  - MRI in 6 months  - Follow-up after MRI    Note was  completed with the assistance of Dragon Fluency software which can often result in accidental word substitutions.     Billing based on complexity, management of the high risk medication  Valery Cronin MD on 12/5/2023 at 8:34 AM          Again, thank you for allowing me to participate in the care of your patient.      Sincerely,    Valery Cronin MD

## 2023-12-05 NOTE — NURSING NOTE
Chief Complaint   Patient presents with    MS    RECHECK     MS follow up      Vitals were taken and medications were reconciled.   Catarino Tang, EMT  8:17 AM

## 2023-12-05 NOTE — PATIENT INSTRUCTIONS
Your exam looks great    Continue kesimpta - reach out if you need assistance getting your medication between jobs     Blood work today     Start wellbutrin     Mri in 6 months     Follow up after MRI

## 2023-12-07 ENCOUNTER — OFFICE VISIT (OUTPATIENT)
Dept: FAMILY MEDICINE | Facility: CLINIC | Age: 43
End: 2023-12-07
Payer: COMMERCIAL

## 2023-12-07 VITALS
HEIGHT: 69 IN | DIASTOLIC BLOOD PRESSURE: 93 MMHG | TEMPERATURE: 98.7 F | RESPIRATION RATE: 14 BRPM | HEART RATE: 82 BPM | WEIGHT: 190 LBS | SYSTOLIC BLOOD PRESSURE: 132 MMHG | BODY MASS INDEX: 28.14 KG/M2 | OXYGEN SATURATION: 100 %

## 2023-12-07 DIAGNOSIS — Z00.00 ROUTINE GENERAL MEDICAL EXAMINATION AT A HEALTH CARE FACILITY: Primary | ICD-10-CM

## 2023-12-07 DIAGNOSIS — G35 MULTIPLE SCLEROSIS (H): ICD-10-CM

## 2023-12-07 DIAGNOSIS — E06.3 HYPOTHYROIDISM DUE TO HASHIMOTO'S THYROIDITIS: ICD-10-CM

## 2023-12-07 DIAGNOSIS — I10 ESSENTIAL HYPERTENSION WITH GOAL BLOOD PRESSURE LESS THAN 130/85: ICD-10-CM

## 2023-12-07 PROCEDURE — 99396 PREV VISIT EST AGE 40-64: CPT | Performed by: NURSE PRACTITIONER

## 2023-12-07 PROCEDURE — 99213 OFFICE O/P EST LOW 20 MIN: CPT | Mod: 25 | Performed by: NURSE PRACTITIONER

## 2023-12-07 RX ORDER — LEVOTHYROXINE SODIUM 200 UG/1
TABLET ORAL
Qty: 90 TABLET | Refills: 3 | Status: SHIPPED | OUTPATIENT
Start: 2023-12-07

## 2023-12-07 RX ORDER — AMLODIPINE BESYLATE 10 MG/1
10 TABLET ORAL DAILY
Qty: 90 TABLET | Refills: 3 | Status: SHIPPED | OUTPATIENT
Start: 2023-12-07 | End: 2023-12-20

## 2023-12-07 ASSESSMENT — ENCOUNTER SYMPTOMS
JOINT SWELLING: 0
WEAKNESS: 0
CONSTIPATION: 0
EYE PAIN: 0
PARESTHESIAS: 0
FEVER: 0
BREAST MASS: 0
HEARTBURN: 0
SORE THROAT: 0
MYALGIAS: 0
HEMATURIA: 0
HEADACHES: 0
HEMATOCHEZIA: 0
DYSURIA: 0
DIZZINESS: 0
NAUSEA: 0
FREQUENCY: 0
ARTHRALGIAS: 0
DIARRHEA: 0
PALPITATIONS: 0
CHILLS: 0
NERVOUS/ANXIOUS: 0
ABDOMINAL PAIN: 0
SHORTNESS OF BREATH: 0
COUGH: 1

## 2023-12-07 ASSESSMENT — PAIN SCALES - GENERAL: PAINLEVEL: NO PAIN (0)

## 2023-12-07 NOTE — PROGRESS NOTES
SUBJECTIVE:   Nickie is a 43 year old, presenting for the following:  Physical and Refill Request        12/7/2023     9:22 AM   Additional Questions   Roomed by tamara     Patient would tolike  to up the dose of high blood pressure because she usually runs high 140/90 even with amlodipine    Healthy Habits:     Getting at least 3 servings of Calcium per day:  Yes    Bi-annual eye exam:  Yes    Dental care twice a year:  NO    Sleep apnea or symptoms of sleep apnea:  Daytime drowsiness    Diet:  Regular (no restrictions)    Frequency of exercise:  None    Taking medications regularly:  Yes    Medication side effects:  None    Additional concerns today:  No      Social History     Tobacco Use    Smoking status: Every Day     Packs/day: 1.00     Years: 5.00     Additional pack years: 0.00     Total pack years: 5.00     Types: Cigarettes    Smokeless tobacco: Never   Substance Use Topics    Alcohol use: No             12/7/2023     9:08 AM   Alcohol Use   Prescreen: >3 drinks/day or >7 drinks/week? Yes   AUDIT SCORE  3     Reviewed orders with patient.  Reviewed health maintenance and updated orders accordingly - Yes  Lab work is in process  Labs reviewed in EPIC  BP Readings from Last 3 Encounters:   12/07/23 (!) 132/93   12/05/23 (!) 147/92   07/03/23 (!) 146/86    Wt Readings from Last 3 Encounters:   12/07/23 86.2 kg (190 lb)   12/05/23 86.2 kg (190 lb)   06/27/23 86.3 kg (190 lb 3.2 oz)                  Patient Active Problem List   Diagnosis    CARDIOVASCULAR SCREENING; LDL GOAL LESS THAN 160    Hashimotos thyroiditis    Vitamin D deficiency    Anxiety disorder    Multiple sclerosis (H)    History of abnormal cervical Pap smear    Essential hypertension with goal blood pressure less than 130/85    Antepartum multigravida of advanced maternal age    Discogenic low back pain    Kidney stone    Class 1 obesity due to excess calories with serious comorbidity and body mass index (BMI) of 30.0 to 30.9 in adult     Anemia    Delayed postpartum hemorrhage    Malpositioned IUD, sequela    Encounter for sterilization     Past Surgical History:   Procedure Laterality Date    HC COLP CERVIX/UPPER VAGINA  2017    HC DILATION/CURETTAGE DIAG/THER NON OB  2017    benign polyp    HC INSERTION INTRAUTERINE DEVICE  2012    HC REMOVE INTRAUTERINE DEVICE  2012    HC TOOTH EXTRACTION W/FORCEP      LAPAROSCOPIC SALPINGECTOMY Bilateral 7/21/2022    Procedure: SALPINGECTOMY, LAPAROSCOPIC bilateral Hysteroscopic intrauterine device removal;  Surgeon: Theresa Key DO;  Location: [facility] OR    PE TUBES      TONSILLECTOMY & ADENOIDECTOMY         Social History     Tobacco Use    Smoking status: Every Day     Packs/day: 1.00     Years: 5.00     Additional pack years: 0.00     Total pack years: 5.00     Types: Cigarettes    Smokeless tobacco: Never   Substance Use Topics    Alcohol use: No     Family History   Problem Relation Age of Onset    Hypertension Father     Cerebrovascular Disease Father 54        hemorrhagic stroke d/t rupture of brain aneurysm    Aneurysm Father     Neurologic Disorder Mother         ms    No Known Problems Sister     Cancer Maternal Grandmother         brain    Multiple Sclerosis Maternal Grandfather     Heart Disease Paternal Grandmother         chf    Diabetes Paternal Grandfather     Diabetes Other     Cancer Paternal Uncle         cancer in eye and liver          Current Outpatient Medications   Medication Sig Dispense Refill    amLODIPine (NORVASC) 10 MG tablet Take 1 tablet (10 mg) by mouth daily 90 tablet 3    buPROPion (WELLBUTRIN XL) 150 MG 24 hr tablet Take 1 tablet (150 mg) by mouth every morning 30 tablet 6    cyanocobalamin (VITAMIN B-12) 500 MCG tablet Take 500 mcg by mouth daily      ibuprofen (ADVIL/MOTRIN) 600 MG tablet Take 1 tablet (600 mg) by mouth every 6 hours as needed (for mild to moderate pain) 30 tablet 0    levothyroxine (SYNTHROID/LEVOTHROID) 200 MCG tablet TAKE ONE TABLET BY MOUTH EVERY DAY  FOR 6 DAYS OF THE WEEK AND 0.5 TABLET ON 1 DAY 90 tablet 3    ofatumumab (KESIMPTA) 20 MG/0.4ML injection Inject 0.4 mLs (20 mg) Subcutaneous every 28 (twenty-eight) days 0.4 mL 11    Vitamin D3 (CHOLECALCIFEROL) 125 MCG (5000 UT) tablet Take by mouth daily       No Known Allergies    Breast Cancer Screenin/24/2021    11:17 AM   Breast CA Risk Assessment (FHS-7)   Do you have a family history of breast, colon, or ovarian cancer? No / Unknown         Mammogram Screening - Offered annual screening and updated Health Maintenance for mutual plan based on risk factor consideration  Pertinent mammograms are reviewed under the imaging tab.    History of abnormal Pap smear: NO - age 30- 65 PAP every 3 years recommended      Latest Ref Rng & Units 2021    11:53 AM 2018    12:00 AM 2012    12:21 PM   PAP / HPV   PAP  Negative for Intraepithelial Lesion or Malignancy (NILM)      PAP (Historical)    NIL    HPV 16 DNA Negative Negative      HPV 18 DNA Negative Negative      Other HR HPV Negative Negative      PAP-ABSTRACT   See Scanned Document            This result is from an external source.     Reviewed and updated as needed this visit by clinical staff   Tobacco  Allergies  Meds              Reviewed and updated as needed this visit by Provider                 Past Medical History:   Diagnosis Date    Anxiety disorder     Class 1 obesity due to excess calories with serious comorbidity and body mass index (BMI) of 30.0 to 30.9 in adult 2019    Discogenic low back pain 2019    Essential hypertension 2019    borderline    History of abnormal cervical Pap smear 20:  Pap NIL/HPV neg 2017: Pap LSIL HPV + (not 16/18)    Hypothyroid     Kidney stone     Multiple sclerosis (H) 2018    MS, relapsing, remitting:  Neurology.  Presented with optic neuritis.       Past Surgical History:   Procedure Laterality Date    HC COLP CERVIX/UPPER VAGINA      HC DILATION/CURETTAGE  "DIAG/THER NON OB  2017    benign polyp    HC INSERTION INTRAUTERINE DEVICE  2012    HC REMOVE INTRAUTERINE DEVICE  2012    HC TOOTH EXTRACTION W/FORCEP      LAPAROSCOPIC SALPINGECTOMY Bilateral 7/21/2022    Procedure: SALPINGECTOMY, LAPAROSCOPIC bilateral Hysteroscopic intrauterine device removal;  Surgeon: Theresa Key DO;  Location: [facility]    PE TUBES      TONSILLECTOMY & ADENOIDECTOMY         Review of Systems   Constitutional:  Negative for chills and fever.   HENT:  Positive for congestion. Negative for ear pain, hearing loss and sore throat.         Mild URI in the last 4 days and is improving   Covid negative   Will continue to monitor her symptoms    Eyes:  Negative for pain and visual disturbance.   Respiratory:  Positive for cough. Negative for shortness of breath.    Cardiovascular:  Negative for chest pain, palpitations and peripheral edema.   Gastrointestinal:  Negative for abdominal pain, constipation, diarrhea, heartburn, hematochezia and nausea.   Breasts:  Negative for tenderness, breast mass and discharge.   Genitourinary:  Negative for dysuria, frequency, genital sores, hematuria, pelvic pain, urgency, vaginal bleeding and vaginal discharge.   Musculoskeletal:  Negative for arthralgias, joint swelling and myalgias.   Skin:  Negative for rash.   Neurological:  Negative for dizziness, weakness, headaches and paresthesias.   Psychiatric/Behavioral:  Negative for mood changes. The patient is not nervous/anxious.         OBJECTIVE:   BP (!) 132/93 (BP Location: Right arm, Patient Position: Sitting, Cuff Size: Adult Large)   Pulse 82   Temp 98.7  F (37.1  C) (Oral)   Resp 14   Ht 1.753 m (5' 9\")   Wt 86.2 kg (190 lb)   SpO2 100%   BMI 28.06 kg/m    Physical Exam  GENERAL: healthy, alert and no distress  EYES: Eyes grossly normal to inspection and conjunctivae and sclerae normal  HENT: ear canals and TM's normal, nose and mouth without ulcers or lesions  NECK: no adenopathy, no asymmetry, " masses, or scars and thyroid normal to palpation  RESP: lungs clear to auscultation - no rales, rhonchi or wheezes  BREAST: normal without masses, tenderness or nipple discharge and no palpable axillary masses or adenopathy  CV: regular rates and rhythm, no murmur, click or rub, peripheral pulses strong, and no peripheral edema  ABDOMEN: soft, nontender, no hepatosplenomegaly, no masses and bowel sounds normal   (female): normal female external genitalia, normal urethral meatus, vaginal mucosa pink, moist, well rugated, and normal cervix/adnexa/uterus without masses or discharge  MS: no gross musculoskeletal defects noted, no edema  SKIN: no suspicious lesions or rashes  NEURO: Normal strength and tone, mentation intact and speech normal  PSYCH: mentation appears normal, affect normal/bright  LYMPH: no cervical, supraclavicular, axillary, or inguinal adenopathy    .Diagnostic Test Results:   Diagnostic Test Results:  Labs reviewed in Epic        ASSESSMENT/PLAN:   Nickie was seen today for physical and refill request.    Diagnoses and all orders for this visit:    Routine general medical examination at a health care facility  -     PRIMARY CARE FOLLOW-UP SCHEDULING; Future  -     REVIEW OF HEALTH MAINTENANCE PROTOCOL ORDERS    Multiple sclerosis (H)  FOLLOW UP WITH SPECIALIST :Neurology    Essential hypertension with goal blood pressure less than 130/85  -     amLODIPine (NORVASC) 10 MG tablet; Take 1 tablet (10 mg) by mouth daily  HTN Plan:  1)  Medication: continue current medication regimen unchanged  2)  Dietary sodium restriction  3)  Regular aerobic exercise  4)  Recheck in 1 year, sooner should new symptoms or   problems arise.  5) See todays orders.    Patient Education: Reviewed risks of hypertension and principles of   treatment.'    Hypothyroidism due to Hashimoto's thyroiditis  -     levothyroxine (SYNTHROID/LEVOTHROID) 200 MCG tablet; TAKE ONE TABLET BY MOUTH EVERY DAY FOR 6 DAYS OF THE WEEK AND 0.5  "TABLET ON 1 DAY  The current medical regimen is effective.  Continue current medication regimen unchanged.      PLAN:   FUTURE LABS:       - Schedule a fasting blood draw   Will follow up and/or notify patient of  results via My Chart to determine further need for followup  Follow up office visit in one year for annual health maintenance exam, sooner PRN.   Patient needs to follow up in if no improvement,or sooner if worsening of symptoms or other symptoms develop.    Patient has been advised of split billing requirements and indicates understanding: Yes      COUNSELING:  Reviewed preventive health counseling, as reflected in patient instructions  Special attention given to:        Regular exercise       Healthy diet/nutrition       Vision screening       Osteoporosis prevention/bone health       Consider Hep C screening for all patients one time for ages 18-79 years       The 10-year ASCVD risk score (Devyn CORONADO, et al., 2019) is: 5%    Values used to calculate the score:      Age: 43 years      Sex: Female      Is Non- : No      Diabetic: No      Tobacco smoker: Yes      Systolic Blood Pressure: 132 mmHg      Is BP treated: Yes      HDL Cholesterol: 42 mg/dL      Total Cholesterol: 183 mg/dL       Advance Care Planning      BMI:   Estimated body mass index is 27.42 kg/m  as calculated from the following:    Height as of 6/27/23: 1.773 m (5' 9.8\").    Weight as of 12/5/23: 86.2 kg (190 lb).   Weight management plan: Discussed healthy diet and exercise guidelines      She reports that she has been smoking cigarettes. She has a 5.00 pack-year smoking history. She has never used smokeless tobacco.  Nicotine/Tobacco Cessation Plan:   Information offered: Patient not interested at this time          Bonita Mullins, JUNO CNP  [facility]"

## 2023-12-07 NOTE — PATIENT INSTRUCTIONS
PLAN:   1.   Symptomatic therapy suggested: will increase amlodipine to 10 mg a day  Increase calcium to 1000mg and 1000 international unit(s) Vit D  2.  Orders Placed This Encounter   Medications    amLODIPine (NORVASC) 10 MG tablet     Sig: Take 1 tablet (10 mg) by mouth daily     Dispense:  90 tablet     Refill:  3    levothyroxine (SYNTHROID/LEVOTHROID) 200 MCG tablet     Sig: TAKE ONE TABLET BY MOUTH EVERY DAY FOR 6 DAYS OF THE WEEK AND 0.5 TABLET ON 1 DAY     Dispense:  90 tablet     Refill:  3     Orders Placed This Encounter   Procedures    REVIEW OF HEALTH MAINTENANCE PROTOCOL ORDERS     3.  FUTURE LABS:       - Schedule a fasting blood draw   Will follow up and/or notify patient of  results via My Chart to determine further need for followup  Follow up office visit in one year for annual health maintenance exam, sooner PRN.   Patient needs to follow up in if no improvement,or sooner if worsening of symptoms or other symptoms develop.          Preventive Health Recommendations  Female Ages 40 to 49    Yearly exam:   See your health care provider every year in order to  Review health changes.   Discuss preventive care.    Review your medicines if your doctor prescribed any.    Get a Pap test every three years (unless you have an abnormal result and your provider advises testing more often).    If you get Pap tests with HPV test, you only need to test every 5 years, unless you have an abnormal result. You do not need a Pap test if your uterus was removed (hysterectomy) and you have not had cancer.    You should be tested each year for STDs (sexually transmitted diseases), if you're at risk.   Ask your doctor if you should have a mammogram.    Have a colonoscopy (test for colon cancer) beginning at age 45.  Ask your provider about other options like a yearly FIT test or Cologuard test every 3 years (stool tests)      Have a cholesterol test every 5 years.     Have a diabetes test (fasting glucose) after age  45. If you are at risk for diabetes, you should have this test every 3 years.    Shots: Get a flu shot each year. Get a tetanus shot every 10 years.     Nutrition:   Eat at least 5 servings of fruits and vegetables each day.  Eat whole-grain bread, whole-wheat pasta and brown rice instead of white grains and rice.  Get adequate Calcium and Vitamin D.      Lifestyle  Exercise at least 150 minutes a week (an average of 30 minutes a day, 5 days a week). This will help you control your weight and prevent disease.  Limit alcohol to one drink per day.  No smoking.   Wear sunscreen to prevent skin cancer.  See your dentist every six months for an exam and cleaning.

## 2023-12-13 ENCOUNTER — LAB (OUTPATIENT)
Dept: LAB | Facility: CLINIC | Age: 43
End: 2023-12-13
Payer: COMMERCIAL

## 2023-12-13 DIAGNOSIS — Z13.0 SCREENING FOR DISORDER OF BLOOD AND BLOOD-FORMING ORGANS: ICD-10-CM

## 2023-12-13 DIAGNOSIS — Z13.6 CARDIOVASCULAR SCREENING; LDL GOAL LESS THAN 130: ICD-10-CM

## 2023-12-13 DIAGNOSIS — E03.9 HYPOTHYROIDISM, UNSPECIFIED TYPE: ICD-10-CM

## 2023-12-13 DIAGNOSIS — I10 ESSENTIAL HYPERTENSION: ICD-10-CM

## 2023-12-13 DIAGNOSIS — Z51.81 THERAPEUTIC DRUG MONITORING: ICD-10-CM

## 2023-12-13 DIAGNOSIS — G35 MULTIPLE SCLEROSIS (H): ICD-10-CM

## 2023-12-13 LAB
ALBUMIN SERPL BCG-MCNC: 4.5 G/DL (ref 3.5–5.2)
ALP SERPL-CCNC: 78 U/L (ref 40–150)
ALT SERPL W P-5'-P-CCNC: 24 U/L (ref 0–50)
ANION GAP SERPL CALCULATED.3IONS-SCNC: 10 MMOL/L (ref 7–15)
AST SERPL W P-5'-P-CCNC: 31 U/L (ref 0–45)
BASOPHILS # BLD AUTO: 0 10E3/UL (ref 0–0.2)
BASOPHILS NFR BLD AUTO: 1 %
BILIRUB SERPL-MCNC: 0.4 MG/DL
BUN SERPL-MCNC: 7.1 MG/DL (ref 6–20)
CALCIUM SERPL-MCNC: 8.9 MG/DL (ref 8.6–10)
CD19 B CELL COMMENT: ABNORMAL
CD19 CELLS # BLD: <1 CELLS/UL (ref 107–698)
CD19 CELLS NFR BLD: <1 % (ref 6–27)
CHLORIDE SERPL-SCNC: 103 MMOL/L (ref 98–107)
CHOLEST SERPL-MCNC: 183 MG/DL
CREAT SERPL-MCNC: 0.92 MG/DL (ref 0.51–0.95)
CREAT UR-MCNC: 231 MG/DL
DEPRECATED HCO3 PLAS-SCNC: 24 MMOL/L (ref 22–29)
EGFRCR SERPLBLD CKD-EPI 2021: 79 ML/MIN/1.73M2
EOSINOPHIL # BLD AUTO: 0.2 10E3/UL (ref 0–0.7)
EOSINOPHIL NFR BLD AUTO: 3 %
ERYTHROCYTE [DISTWIDTH] IN BLOOD BY AUTOMATED COUNT: 12.1 % (ref 10–15)
FASTING STATUS PATIENT QL REPORTED: YES
GLUCOSE SERPL-MCNC: 90 MG/DL (ref 70–99)
HCT VFR BLD AUTO: 43.3 % (ref 35–47)
HDLC SERPL-MCNC: 42 MG/DL
HGB BLD-MCNC: 14.6 G/DL (ref 11.7–15.7)
IMM GRANULOCYTES # BLD: 0 10E3/UL
IMM GRANULOCYTES NFR BLD: 0 %
LDLC SERPL CALC-MCNC: 99 MG/DL
LYMPHOCYTES # BLD AUTO: 2.1 10E3/UL (ref 0.8–5.3)
LYMPHOCYTES NFR BLD AUTO: 36 %
MCH RBC QN AUTO: 31.7 PG (ref 26.5–33)
MCHC RBC AUTO-ENTMCNC: 33.7 G/DL (ref 31.5–36.5)
MCV RBC AUTO: 94 FL (ref 78–100)
MICROALBUMIN UR-MCNC: 15.8 MG/L
MICROALBUMIN/CREAT UR: 6.84 MG/G CR (ref 0–25)
MONOCYTES # BLD AUTO: 0.3 10E3/UL (ref 0–1.3)
MONOCYTES NFR BLD AUTO: 5 %
NEUTROPHILS # BLD AUTO: 3.1 10E3/UL (ref 1.6–8.3)
NEUTROPHILS NFR BLD AUTO: 55 %
NONHDLC SERPL-MCNC: 141 MG/DL
NRBC # BLD AUTO: 0 10E3/UL
NRBC BLD AUTO-RTO: 0 /100
PLATELET # BLD AUTO: 246 10E3/UL (ref 150–450)
POTASSIUM SERPL-SCNC: 3.6 MMOL/L (ref 3.4–5.3)
PROT SERPL-MCNC: 7.4 G/DL (ref 6.4–8.3)
RBC # BLD AUTO: 4.6 10E6/UL (ref 3.8–5.2)
SODIUM SERPL-SCNC: 137 MMOL/L (ref 135–145)
T4 FREE SERPL-MCNC: 1.29 NG/DL (ref 0.9–1.7)
TRIGL SERPL-MCNC: 209 MG/DL
TSH SERPL DL<=0.005 MIU/L-ACNC: 64.3 UIU/ML (ref 0.3–4.2)
VIT B12 SERPL-MCNC: 1309 PG/ML (ref 232–1245)
WBC # BLD AUTO: 5.7 10E3/UL (ref 4–11)

## 2023-12-13 PROCEDURE — 86355 B CELLS TOTAL COUNT: CPT

## 2023-12-13 PROCEDURE — 80053 COMPREHEN METABOLIC PANEL: CPT

## 2023-12-13 PROCEDURE — 85025 COMPLETE CBC W/AUTO DIFF WBC: CPT

## 2023-12-13 PROCEDURE — 84443 ASSAY THYROID STIM HORMONE: CPT

## 2023-12-13 PROCEDURE — 82607 VITAMIN B-12: CPT

## 2023-12-13 PROCEDURE — 84439 ASSAY OF FREE THYROXINE: CPT

## 2023-12-13 PROCEDURE — 80061 LIPID PANEL: CPT

## 2023-12-13 PROCEDURE — 36415 COLL VENOUS BLD VENIPUNCTURE: CPT

## 2023-12-13 PROCEDURE — 82784 ASSAY IGA/IGD/IGG/IGM EACH: CPT

## 2023-12-13 PROCEDURE — 82570 ASSAY OF URINE CREATININE: CPT

## 2023-12-13 PROCEDURE — 82043 UR ALBUMIN QUANTITATIVE: CPT

## 2023-12-14 LAB — IGG SERPL-MCNC: 1127 MG/DL (ref 610–1616)

## 2023-12-20 ENCOUNTER — MYC MEDICAL ADVICE (OUTPATIENT)
Dept: FAMILY MEDICINE | Facility: CLINIC | Age: 43
End: 2023-12-20
Payer: COMMERCIAL

## 2023-12-20 DIAGNOSIS — I10 ESSENTIAL HYPERTENSION WITH GOAL BLOOD PRESSURE LESS THAN 130/85: Primary | ICD-10-CM

## 2023-12-20 RX ORDER — AMLODIPINE BESYLATE 10 MG/1
5 TABLET ORAL DAILY
Start: 2023-12-20 | End: 2023-12-22

## 2023-12-20 RX ORDER — LISINOPRIL 10 MG/1
10 TABLET ORAL DAILY
Qty: 30 TABLET | Refills: 1 | Status: SHIPPED | OUTPATIENT
Start: 2023-12-20 | End: 2024-03-04

## 2023-12-22 RX ORDER — AMLODIPINE BESYLATE 5 MG/1
5 TABLET ORAL DAILY
Qty: 90 TABLET | Refills: 1 | Status: SHIPPED | OUTPATIENT
Start: 2023-12-22

## 2023-12-27 NOTE — RESULT ENCOUNTER NOTE
Shakeel Garcia,    Attached are your test results.  -Normal red blood cell (hgb) levels, normal white blood cell count and normal platelet levels.  -HDL(good) cholesterol level is low and your triglycerides are elevated which can increase your heart disease risk.  A diet high in fat and simple carbohydrates, genetics and being overweight can contribute to this. LDL(bad) cholesterol level is normal.  ADVISE:exercising 150 minutes of aerobic exercise per week (30 minutes 5 days per week or 50 minutes 3 days per week are options), and omega-3 fatty acids (fish oil) daily are helpful to improve this.  In 12 months, you should recheck your fasting cholesterol panel by scheduling a lab-only appointment.  -Liver and gallbladder tests are normal (ALT,AST, Alk phos, bilirubin), kidney function is normal (Cr, GFR), sodium is normal, potassium is normal, calcium is normal, glucose is normal.  -TSH (thyroid stimulating hormone) is abnormal suggesting you are currently underreplaced.  Were you out of your medication for a period of time? -Microalbumin (urine protein) test is normal.  ADVISE: rechecking this annually.   Please contact us if you have any questions.    Bonita Mullins, CNP

## 2024-03-02 DIAGNOSIS — I10 ESSENTIAL HYPERTENSION WITH GOAL BLOOD PRESSURE LESS THAN 130/85: ICD-10-CM

## 2024-03-04 RX ORDER — LISINOPRIL 10 MG/1
10 TABLET ORAL DAILY
Qty: 90 TABLET | Refills: 1 | Status: SHIPPED | OUTPATIENT
Start: 2024-03-04

## 2024-04-05 ENCOUNTER — DOCUMENTATION ONLY (OUTPATIENT)
Dept: NEUROLOGY | Facility: CLINIC | Age: 44
End: 2024-04-05
Payer: COMMERCIAL

## 2024-04-05 NOTE — PROGRESS NOTES
Authorization for Yessica has been received from [employer], approval valid from 03/06/2024 through 04/05/2025.  Catarino Tang EMT April 5, 2024

## 2024-04-10 ENCOUNTER — NURSE TRIAGE (OUTPATIENT)
Dept: FAMILY MEDICINE | Facility: CLINIC | Age: 44
End: 2024-04-10
Payer: COMMERCIAL

## 2024-04-10 NOTE — TELEPHONE ENCOUNTER
"Patient's family members are all positive for strep.   Patient having sore throat, body aches, and chills. Had a strep test done at [facility] and it was negative but she's not sure how good the swab was done.   Asking if she can be treated for strep. Patient has MS and is on immunotherapy so she does not qualify for RN protocol.  This RN advised patient submit an E-visit to provider.  She agrees to this and will do this now.     Reason for Disposition   Diabetes mellitus or weak immune system (e.g., HIV positive, cancer chemo, splenectomy, organ transplant, chronic steroids)    Additional Information   Negative: SEVERE difficulty breathing (e.g., struggling for each breath, speaks in single words)   Negative: Sounds like a life-threatening emergency to the triager   Negative: Throat culture results, call about   Negative: Productive cough is main symptom   Negative: Runny nose is main symptom   Negative: Drooling or spitting out saliva (because can't swallow)   Negative: Unable to open mouth completely   Negative: Drinking very little and has signs of dehydration (e.g., no urine > 12 hours, very dry mouth, very lightheaded)   Negative: Patient sounds very sick or weak to the triager   Negative: Difficulty breathing (per caller) but not severe   Negative: Fever > 103 F (39.4 C)   Negative: Refuses to drink anything for > 12 hours   Negative: SEVERE sore throat pain   Negative: Pus on tonsils (back of throat) and swollen neck lymph nodes ('glands')   Negative: Earache also present   Negative: Widespread rash (especially chest and abdomen)    Answer Assessment - Initial Assessment Questions  1. ONSET: \"When did the throat start hurting?\" (Hours or days ago)       3 days ago  2. SEVERITY: \"How bad is the sore throat?\" (Scale 1-10; mild, moderate or severe)    - MILD (1-3):  Doesn't interfere with eating or normal activities.    - MODERATE (4-7): Interferes with eating some solids and normal activities.    - SEVERE (8-10):  " "Excruciating pain, interferes with most normal activities.    - SEVERE WITH DYSPHAGIA (10): Can't swallow liquids, drooling.      moderate  3. STREP EXPOSURE: \"Has there been any exposure to strep within the past week?\" If Yes, ask: \"What type of contact occurred?\"       Yes positive strep in the family   4.  VIRAL SYMPTOMS: \"Are there any symptoms of a cold, such as a runny nose, cough, hoarse voice or red eyes?\"       Chills, body aches, sore throat  5. FEVER: \"Do you have a fever?\" If Yes, ask: \"What is your temperature, how was it measured, and when did it start?\"      no  6. PUS ON THE TONSILS: \"Is there pus on the tonsils in the back of your throat?\"      no  7. OTHER SYMPTOMS: \"Do you have any other symptoms?\" (e.g., difficulty breathing, headache, rash)      no  8. PREGNANCY: \"Is there any chance you are pregnant?\" \"When was your last menstrual period?\"      Didn't ask    Protocols used: Sore Throat-A-OH    Apolonia Turner BSN, RN    "

## 2024-04-10 NOTE — TELEPHONE ENCOUNTER
Patient sent [employer] message reporting:    My entire family has tested positive for strep. I got a test and it was negative (at [facility]), however, my throat does hurt. I am on a disease modifying therapy for my MS and so I am immunocompromised. I am wondering if you would recommend treating me as well? I am wondering if it could be a false negative, especially since the 4 other people in the household are positive. Thanks!  My throat started hurting yesterday. The pain level is a 4. I have mild body aches, but no other symptoms, but all other family members are positive. Thank you.     RN called patient and LVM to call clinic at [phone].      If patient calls back, please further triage patient's symptoms and run through strep protocol if patient is agreeable.      Willa Connolly RN

## 2025-01-21 DIAGNOSIS — E06.3 HYPOTHYROIDISM DUE TO HASHIMOTO'S THYROIDITIS: ICD-10-CM

## 2025-01-21 RX ORDER — LEVOTHYROXINE SODIUM 200 UG/1
TABLET ORAL
Qty: 28 TABLET | Refills: 0 | OUTPATIENT
Start: 2025-01-21

## 2025-04-30 ENCOUNTER — MYC MEDICAL ADVICE (OUTPATIENT)
Dept: NEUROLOGY | Facility: CLINIC | Age: 45
End: 2025-04-30
Payer: COMMERCIAL

## 2025-04-30 DIAGNOSIS — G35 MULTIPLE SCLEROSIS (H): ICD-10-CM

## 2025-04-30 RX ORDER — OFATUMUMAB 20 MG/.4ML
20 INJECTION, SOLUTION SUBCUTANEOUS
Qty: 0.4 ML | Refills: 1 | Status: SHIPPED | OUTPATIENT
Start: 2025-04-30

## 2025-04-30 NOTE — TELEPHONE ENCOUNTER
Pt requesting refill of Kesimpta. Overdue for follow-up (last visit December 2023), but Dr Cronin no longer in insurance network. Has neurology appointment set up through [facility] for June. Requesting two months of Kesimpta, to avoid gap in treatment. Request routed to Dr Cronin for approval.    Rabia Arvizu RN

## (undated) DEVICE — GLOVE PROTEXIS W/NEU-THERA 5.5  2D73TE55

## (undated) DEVICE — ENDO TROCAR FIRST ENTRY KII FIOS ADV FIX 05X100MM CFF03

## (undated) DEVICE — ANTIFOG SOLUTION W/FOAM PAD CF-1001

## (undated) DEVICE — PREP CHLORAPREP 26ML TINTED ORANGE  260815

## (undated) DEVICE — BLADE KNIFE SURG 11 371111

## (undated) DEVICE — DRAPE LAVH/LAPAROSCOPY W/POUCH 29474

## (undated) DEVICE — ENDO SCOPE WARMER TM500

## (undated) DEVICE — ESU ENDO SCISSORS 5MM CVD 5DCS

## (undated) DEVICE — ENDO TROCAR SLEEVE KII ADV FIXATION 05X100MM CFS02

## (undated) DEVICE — DRAPE POUCH INSTRUMENT 3 POCKET 1018L

## (undated) DEVICE — SU MONOCRYL 4-0 PS-2 18" UND Y496G

## (undated) DEVICE — KIT PATIENT POSITIONING PIGAZZI LATEX FREE 40580

## (undated) DEVICE — ADH SKIN CLOSURE PREMIERPRO EXOFIN 1.0ML 3470

## (undated) DEVICE — CATH INTERMITTENT CLEAN-CATH FEMALE 14FR 6" VINYL LF 420614

## (undated) DEVICE — GLOVE PROTEXIS BLUE W/NEU-THERA 6.0  2D73EB60

## (undated) DEVICE — ESU LIGASURE LAPAROSCOPIC BLUNT TIP SEALER 5MMX37CM LF1837

## (undated) DEVICE — PACK MINOR SBA15MIFSE

## (undated) RX ORDER — ONDANSETRON 2 MG/ML
INJECTION INTRAMUSCULAR; INTRAVENOUS
Status: DISPENSED
Start: 2022-07-21

## (undated) RX ORDER — OXYCODONE HYDROCHLORIDE 5 MG/1
TABLET ORAL
Status: DISPENSED
Start: 2022-07-21

## (undated) RX ORDER — PROPOFOL 10 MG/ML
INJECTION, EMULSION INTRAVENOUS
Status: DISPENSED
Start: 2022-07-21

## (undated) RX ORDER — KETOROLAC TROMETHAMINE 30 MG/ML
INJECTION, SOLUTION INTRAMUSCULAR; INTRAVENOUS
Status: DISPENSED
Start: 2022-07-21

## (undated) RX ORDER — DEXAMETHASONE SODIUM PHOSPHATE 4 MG/ML
INJECTION, SOLUTION INTRA-ARTICULAR; INTRALESIONAL; INTRAMUSCULAR; INTRAVENOUS; SOFT TISSUE
Status: DISPENSED
Start: 2022-07-21

## (undated) RX ORDER — FENTANYL CITRATE 50 UG/ML
INJECTION, SOLUTION INTRAMUSCULAR; INTRAVENOUS
Status: DISPENSED
Start: 2022-07-21